# Patient Record
Sex: MALE | Race: WHITE | NOT HISPANIC OR LATINO | Employment: OTHER | URBAN - METROPOLITAN AREA
[De-identification: names, ages, dates, MRNs, and addresses within clinical notes are randomized per-mention and may not be internally consistent; named-entity substitution may affect disease eponyms.]

---

## 2022-03-09 ENCOUNTER — HOSPITAL ENCOUNTER (EMERGENCY)
Facility: HOSPITAL | Age: 32
Discharge: HOME | End: 2022-03-09
Attending: EMERGENCY MEDICINE | Admitting: EMERGENCY MEDICINE

## 2022-03-09 ENCOUNTER — APPOINTMENT (OUTPATIENT)
Dept: CT IMAGING | Facility: HOSPITAL | Age: 32
End: 2022-03-09

## 2022-03-09 VITALS
HEIGHT: 74 IN | HEART RATE: 74 BPM | WEIGHT: 315 LBS | SYSTOLIC BLOOD PRESSURE: 136 MMHG | BODY MASS INDEX: 40.43 KG/M2 | RESPIRATION RATE: 16 BRPM | OXYGEN SATURATION: 100 % | DIASTOLIC BLOOD PRESSURE: 90 MMHG | TEMPERATURE: 97.5 F

## 2022-03-09 VITALS
BODY MASS INDEX: 40.2 KG/M2 | HEART RATE: 69 BPM | OXYGEN SATURATION: 95 % | HEIGHT: 74 IN | WEIGHT: 313.27 LBS | TEMPERATURE: 98.2 F | RESPIRATION RATE: 15 BRPM | DIASTOLIC BLOOD PRESSURE: 84 MMHG | SYSTOLIC BLOOD PRESSURE: 134 MMHG

## 2022-03-09 DIAGNOSIS — N13.30 HYDRONEPHROSIS, UNSPECIFIED HYDRONEPHROSIS TYPE: ICD-10-CM

## 2022-03-09 DIAGNOSIS — N23 RENAL COLIC ON RIGHT SIDE: Primary | ICD-10-CM

## 2022-03-09 LAB
ALBUMIN SERPL-MCNC: 4.3 G/DL (ref 3.5–5.2)
ALBUMIN SERPL-MCNC: 4.7 G/DL (ref 3.5–5.2)
ALBUMIN/GLOB SERPL: 1.5 G/DL
ALBUMIN/GLOB SERPL: 1.7 G/DL
ALP SERPL-CCNC: 69 U/L (ref 39–117)
ALP SERPL-CCNC: 73 U/L (ref 39–117)
ALT SERPL W P-5'-P-CCNC: 35 U/L (ref 1–41)
ALT SERPL W P-5'-P-CCNC: 41 U/L (ref 1–41)
ANION GAP SERPL CALCULATED.3IONS-SCNC: 13 MMOL/L (ref 5–15)
ANION GAP SERPL CALCULATED.3IONS-SCNC: 13.2 MMOL/L (ref 5–15)
AST SERPL-CCNC: 26 U/L (ref 1–40)
AST SERPL-CCNC: 26 U/L (ref 1–40)
BACTERIA UR QL AUTO: ABNORMAL /HPF
BACTERIA UR QL AUTO: ABNORMAL /HPF
BASOPHILS # BLD AUTO: 0.05 10*3/MM3 (ref 0–0.2)
BASOPHILS # BLD AUTO: 0.07 10*3/MM3 (ref 0–0.2)
BASOPHILS NFR BLD AUTO: 0.4 % (ref 0–1.5)
BASOPHILS NFR BLD AUTO: 0.6 % (ref 0–1.5)
BILIRUB SERPL-MCNC: 1 MG/DL (ref 0–1.2)
BILIRUB SERPL-MCNC: 1 MG/DL (ref 0–1.2)
BILIRUB UR QL STRIP: NEGATIVE
BILIRUB UR QL STRIP: NEGATIVE
BUN SERPL-MCNC: 18 MG/DL (ref 6–20)
BUN SERPL-MCNC: 21 MG/DL (ref 6–20)
BUN/CREAT SERPL: 15.8 (ref 7–25)
BUN/CREAT SERPL: 16.4 (ref 7–25)
CALCIUM SPEC-SCNC: 9.1 MG/DL (ref 8.6–10.5)
CALCIUM SPEC-SCNC: 9.3 MG/DL (ref 8.6–10.5)
CHLORIDE SERPL-SCNC: 106 MMOL/L (ref 98–107)
CHLORIDE SERPL-SCNC: 107 MMOL/L (ref 98–107)
CLARITY UR: CLEAR
CLARITY UR: CLEAR
CO2 SERPL-SCNC: 16.8 MMOL/L (ref 22–29)
CO2 SERPL-SCNC: 18 MMOL/L (ref 22–29)
COLOR UR: YELLOW
COLOR UR: YELLOW
CREAT SERPL-MCNC: 1.1 MG/DL (ref 0.76–1.27)
CREAT SERPL-MCNC: 1.33 MG/DL (ref 0.76–1.27)
DEPRECATED RDW RBC AUTO: 33.8 FL (ref 37–54)
DEPRECATED RDW RBC AUTO: 34.5 FL (ref 37–54)
EGFRCR SERPLBLD CKD-EPI 2021: 74.2 ML/MIN/1.73
EGFRCR SERPLBLD CKD-EPI 2021: 93.2 ML/MIN/1.73
EOSINOPHIL # BLD AUTO: 0.08 10*3/MM3 (ref 0–0.4)
EOSINOPHIL # BLD AUTO: 0.11 10*3/MM3 (ref 0–0.4)
EOSINOPHIL NFR BLD AUTO: 0.6 % (ref 0.3–6.2)
EOSINOPHIL NFR BLD AUTO: 0.9 % (ref 0.3–6.2)
ERYTHROCYTE [DISTWIDTH] IN BLOOD BY AUTOMATED COUNT: 11.7 % (ref 12.3–15.4)
ERYTHROCYTE [DISTWIDTH] IN BLOOD BY AUTOMATED COUNT: 11.9 % (ref 12.3–15.4)
GLOBULIN UR ELPH-MCNC: 2.7 GM/DL
GLOBULIN UR ELPH-MCNC: 2.9 GM/DL
GLUCOSE SERPL-MCNC: 114 MG/DL (ref 65–99)
GLUCOSE SERPL-MCNC: 124 MG/DL (ref 65–99)
GLUCOSE UR STRIP-MCNC: NEGATIVE MG/DL
GLUCOSE UR STRIP-MCNC: NEGATIVE MG/DL
HCT VFR BLD AUTO: 42.8 % (ref 37.5–51)
HCT VFR BLD AUTO: 43.9 % (ref 37.5–51)
HGB BLD-MCNC: 15.2 G/DL (ref 13–17.7)
HGB BLD-MCNC: 15.7 G/DL (ref 13–17.7)
HGB UR QL STRIP.AUTO: ABNORMAL
HGB UR QL STRIP.AUTO: ABNORMAL
HOLD SPECIMEN: NORMAL
HYALINE CASTS UR QL AUTO: ABNORMAL /LPF
HYALINE CASTS UR QL AUTO: ABNORMAL /LPF
IMM GRANULOCYTES # BLD AUTO: 0.08 10*3/MM3 (ref 0–0.05)
IMM GRANULOCYTES # BLD AUTO: 0.1 10*3/MM3 (ref 0–0.05)
IMM GRANULOCYTES NFR BLD AUTO: 0.6 % (ref 0–0.5)
IMM GRANULOCYTES NFR BLD AUTO: 0.8 % (ref 0–0.5)
KETONES UR QL STRIP: NEGATIVE
KETONES UR QL STRIP: NEGATIVE
LEUKOCYTE ESTERASE UR QL STRIP.AUTO: NEGATIVE
LEUKOCYTE ESTERASE UR QL STRIP.AUTO: NEGATIVE
LIPASE SERPL-CCNC: 17 U/L (ref 13–60)
LIPASE SERPL-CCNC: 18 U/L (ref 13–60)
LYMPHOCYTES # BLD AUTO: 1.76 10*3/MM3 (ref 0.7–3.1)
LYMPHOCYTES # BLD AUTO: 2.43 10*3/MM3 (ref 0.7–3.1)
LYMPHOCYTES NFR BLD AUTO: 14 % (ref 19.6–45.3)
LYMPHOCYTES NFR BLD AUTO: 19.4 % (ref 19.6–45.3)
MCH RBC QN AUTO: 28.8 PG (ref 26.6–33)
MCH RBC QN AUTO: 29.1 PG (ref 26.6–33)
MCHC RBC AUTO-ENTMCNC: 35.5 G/DL (ref 31.5–35.7)
MCHC RBC AUTO-ENTMCNC: 35.8 G/DL (ref 31.5–35.7)
MCV RBC AUTO: 81.2 FL (ref 79–97)
MCV RBC AUTO: 81.4 FL (ref 79–97)
MONOCYTES # BLD AUTO: 0.8 10*3/MM3 (ref 0.1–0.9)
MONOCYTES # BLD AUTO: 0.94 10*3/MM3 (ref 0.1–0.9)
MONOCYTES NFR BLD AUTO: 6.4 % (ref 5–12)
MONOCYTES NFR BLD AUTO: 7.5 % (ref 5–12)
NEUTROPHILS NFR BLD AUTO: 71.9 % (ref 42.7–76)
NEUTROPHILS NFR BLD AUTO: 76.9 % (ref 42.7–76)
NEUTROPHILS NFR BLD AUTO: 9.02 10*3/MM3 (ref 1.7–7)
NEUTROPHILS NFR BLD AUTO: 9.64 10*3/MM3 (ref 1.7–7)
NITRITE UR QL STRIP: NEGATIVE
NITRITE UR QL STRIP: NEGATIVE
NRBC BLD AUTO-RTO: 0 /100 WBC (ref 0–0.2)
NRBC BLD AUTO-RTO: 0 /100 WBC (ref 0–0.2)
PH UR STRIP.AUTO: 5.5 [PH] (ref 5–8)
PH UR STRIP.AUTO: <=5 [PH] (ref 5–8)
PLATELET # BLD AUTO: 242 10*3/MM3 (ref 140–450)
PLATELET # BLD AUTO: 280 10*3/MM3 (ref 140–450)
PMV BLD AUTO: 11 FL (ref 6–12)
PMV BLD AUTO: 11.5 FL (ref 6–12)
POTASSIUM SERPL-SCNC: 3.7 MMOL/L (ref 3.5–5.2)
POTASSIUM SERPL-SCNC: 3.8 MMOL/L (ref 3.5–5.2)
PROT SERPL-MCNC: 7.2 G/DL (ref 6–8.5)
PROT SERPL-MCNC: 7.4 G/DL (ref 6–8.5)
PROT UR QL STRIP: NEGATIVE
PROT UR QL STRIP: NEGATIVE
RBC # BLD AUTO: 5.27 10*6/MM3 (ref 4.14–5.8)
RBC # BLD AUTO: 5.39 10*6/MM3 (ref 4.14–5.8)
RBC # UR STRIP: ABNORMAL /HPF
RBC # UR STRIP: ABNORMAL /HPF
REF LAB TEST METHOD: ABNORMAL
REF LAB TEST METHOD: ABNORMAL
SODIUM SERPL-SCNC: 136 MMOL/L (ref 136–145)
SODIUM SERPL-SCNC: 138 MMOL/L (ref 136–145)
SP GR UR STRIP: >=1.03 (ref 1–1.03)
SP GR UR STRIP: >=1.03 (ref 1–1.03)
SQUAMOUS #/AREA URNS HPF: ABNORMAL /HPF
SQUAMOUS #/AREA URNS HPF: ABNORMAL /HPF
UROBILINOGEN UR QL STRIP: ABNORMAL
UROBILINOGEN UR QL STRIP: ABNORMAL
WBC # UR STRIP: ABNORMAL /HPF
WBC # UR STRIP: ABNORMAL /HPF
WBC NRBC COR # BLD: 12.53 10*3/MM3 (ref 3.4–10.8)
WBC NRBC COR # BLD: 12.55 10*3/MM3 (ref 3.4–10.8)
WHOLE BLOOD HOLD SPECIMEN: NORMAL

## 2022-03-09 PROCEDURE — 96374 THER/PROPH/DIAG INJ IV PUSH: CPT

## 2022-03-09 PROCEDURE — 36415 COLL VENOUS BLD VENIPUNCTURE: CPT

## 2022-03-09 PROCEDURE — 83690 ASSAY OF LIPASE: CPT

## 2022-03-09 PROCEDURE — 81001 URINALYSIS AUTO W/SCOPE: CPT | Performed by: EMERGENCY MEDICINE

## 2022-03-09 PROCEDURE — 81001 URINALYSIS AUTO W/SCOPE: CPT

## 2022-03-09 PROCEDURE — 80053 COMPREHEN METABOLIC PANEL: CPT | Performed by: EMERGENCY MEDICINE

## 2022-03-09 PROCEDURE — 96375 TX/PRO/DX INJ NEW DRUG ADDON: CPT

## 2022-03-09 PROCEDURE — 99283 EMERGENCY DEPT VISIT LOW MDM: CPT

## 2022-03-09 PROCEDURE — 85025 COMPLETE CBC W/AUTO DIFF WBC: CPT | Performed by: EMERGENCY MEDICINE

## 2022-03-09 PROCEDURE — 83690 ASSAY OF LIPASE: CPT | Performed by: EMERGENCY MEDICINE

## 2022-03-09 PROCEDURE — 25010000002 ONDANSETRON PER 1 MG: Performed by: NURSE PRACTITIONER

## 2022-03-09 PROCEDURE — 25010000002 KETOROLAC TROMETHAMINE PER 15 MG: Performed by: NURSE PRACTITIONER

## 2022-03-09 PROCEDURE — 80053 COMPREHEN METABOLIC PANEL: CPT

## 2022-03-09 PROCEDURE — 25010000002 HYDROMORPHONE 1 MG/ML SOLUTION: Performed by: EMERGENCY MEDICINE

## 2022-03-09 PROCEDURE — 85025 COMPLETE CBC W/AUTO DIFF WBC: CPT

## 2022-03-09 PROCEDURE — 25010000002 ONDANSETRON PER 1 MG: Performed by: EMERGENCY MEDICINE

## 2022-03-09 PROCEDURE — 25010000002 KETOROLAC TROMETHAMINE PER 15 MG: Performed by: EMERGENCY MEDICINE

## 2022-03-09 PROCEDURE — 74176 CT ABD & PELVIS W/O CONTRAST: CPT

## 2022-03-09 RX ORDER — HYDROCODONE BITARTRATE AND ACETAMINOPHEN 7.5; 325 MG/1; MG/1
1 TABLET ORAL EVERY 6 HOURS PRN
Qty: 12 TABLET | Refills: 0 | Status: SHIPPED | OUTPATIENT
Start: 2022-03-09 | End: 2022-05-26

## 2022-03-09 RX ORDER — SODIUM CHLORIDE 0.9 % (FLUSH) 0.9 %
10 SYRINGE (ML) INJECTION AS NEEDED
Status: DISCONTINUED | OUTPATIENT
Start: 2022-03-09 | End: 2022-03-09 | Stop reason: HOSPADM

## 2022-03-09 RX ORDER — ONDANSETRON 2 MG/ML
4 INJECTION INTRAMUSCULAR; INTRAVENOUS ONCE
Status: COMPLETED | OUTPATIENT
Start: 2022-03-09 | End: 2022-03-09

## 2022-03-09 RX ORDER — TOPIRAMATE 100 MG/1
100 TABLET, FILM COATED ORAL 2 TIMES DAILY
COMMUNITY
End: 2023-01-13

## 2022-03-09 RX ORDER — TRAZODONE HYDROCHLORIDE 100 MG/1
100 TABLET ORAL NIGHTLY
COMMUNITY
End: 2022-05-26 | Stop reason: SDUPTHER

## 2022-03-09 RX ORDER — KETOROLAC TROMETHAMINE 30 MG/ML
30 INJECTION, SOLUTION INTRAMUSCULAR; INTRAVENOUS ONCE
Status: COMPLETED | OUTPATIENT
Start: 2022-03-09 | End: 2022-03-09

## 2022-03-09 RX ORDER — PHENTERMINE HYDROCHLORIDE 30 MG/1
30 CAPSULE ORAL EVERY MORNING
COMMUNITY
End: 2022-05-26

## 2022-03-09 RX ORDER — KETOROLAC TROMETHAMINE 15 MG/ML
15 INJECTION, SOLUTION INTRAMUSCULAR; INTRAVENOUS ONCE
Status: COMPLETED | OUTPATIENT
Start: 2022-03-09 | End: 2022-03-09

## 2022-03-09 RX ORDER — LANOLIN ALCOHOL/MO/W.PET/CERES
1000 CREAM (GRAM) TOPICAL DAILY
COMMUNITY
End: 2022-05-26

## 2022-03-09 RX ORDER — CYCLOBENZAPRINE HCL 10 MG
10 TABLET ORAL 3 TIMES DAILY PRN
COMMUNITY
End: 2022-05-26

## 2022-03-09 RX ORDER — GABAPENTIN 100 MG/1
100 CAPSULE ORAL 3 TIMES DAILY
COMMUNITY

## 2022-03-09 RX ORDER — ERGOCALCIFEROL 1.25 MG/1
50000 CAPSULE ORAL WEEKLY
COMMUNITY
End: 2022-08-22

## 2022-03-09 RX ORDER — TESTOSTERONE CYPIONATE 100 MG/ML
INJECTION, SOLUTION INTRAMUSCULAR
COMMUNITY
End: 2022-05-26

## 2022-03-09 RX ORDER — ONDANSETRON 4 MG/1
4 TABLET, ORALLY DISINTEGRATING ORAL EVERY 8 HOURS PRN
Qty: 15 TABLET | Refills: 0 | Status: SHIPPED | OUTPATIENT
Start: 2022-03-09 | End: 2022-05-26

## 2022-03-09 RX ADMIN — ONDANSETRON 4 MG: 2 INJECTION INTRAMUSCULAR; INTRAVENOUS at 03:57

## 2022-03-09 RX ADMIN — ONDANSETRON 4 MG: 2 INJECTION INTRAMUSCULAR; INTRAVENOUS at 16:26

## 2022-03-09 RX ADMIN — HYDROMORPHONE HYDROCHLORIDE 1 MG: 1 INJECTION, SOLUTION INTRAMUSCULAR; INTRAVENOUS; SUBCUTANEOUS at 16:36

## 2022-03-09 RX ADMIN — SODIUM CHLORIDE 1000 ML: 9 INJECTION, SOLUTION INTRAVENOUS at 03:57

## 2022-03-09 RX ADMIN — HYDROMORPHONE HYDROCHLORIDE 1 MG: 1 INJECTION, SOLUTION INTRAMUSCULAR; INTRAVENOUS; SUBCUTANEOUS at 03:57

## 2022-03-09 RX ADMIN — SODIUM CHLORIDE 1000 ML: 9 INJECTION, SOLUTION INTRAVENOUS at 16:25

## 2022-03-09 RX ADMIN — KETOROLAC TROMETHAMINE 30 MG: 30 INJECTION, SOLUTION INTRAMUSCULAR; INTRAVENOUS at 03:57

## 2022-03-09 RX ADMIN — KETOROLAC TROMETHAMINE 15 MG: 15 INJECTION, SOLUTION INTRAMUSCULAR; INTRAVENOUS at 16:27

## 2022-03-09 NOTE — ED PROVIDER NOTES
Time: 05:04 EST  Arrived by: EMS  Chief Complaint: Right flank pain  History provided by: Patient  History is limited by: N/A    History of Present Illness:  Patient is a 31 y.o. year old male that presents to the emergency department with sudden onset of right flank pain      History provided by:  Patient  Flank Pain  Pain location:  R flank  Pain quality: sharp and stabbing    Pain radiates to:  RLQ and LLQ  Pain severity:  Severe  Onset quality:  Sudden  Duration:  4 hours  Timing:  Constant  Progression:  Unchanged  Chronicity:  New  Context: awakening from sleep    Relieved by:  Nothing  Worsened by:  Nothing  Ineffective treatments:  None tried  Associated symptoms: dysuria    Associated symptoms: no anorexia, no belching, no chest pain, no chills, no constipation, no cough, no diarrhea, no fatigue, no fever, no flatus, no hematemesis, no hematochezia, no hematuria, no melena, no nausea, no shortness of breath, no sore throat and no vomiting        Similar Symptoms Previously: No  Recently seen: No      Patient Care Team  Primary Care Provider: None    Past Medical History:     Allergies   Allergen Reactions   • Compazine [Prochlorperazine] Arrhythmia     History reviewed. No pertinent past medical history.  History reviewed. No pertinent surgical history.  History reviewed. No pertinent family history.    Home Medications:  Prior to Admission medications    Not on File        Social History:   PT  reports that he has never smoked. He has never used smokeless tobacco. He reports that he does not drink alcohol and does not use drugs.    Record Review:  I have reviewed the patient's records in [employer].     Review of Systems  Review of Systems   Constitutional: Negative for chills, fatigue and fever.   HENT: Negative for sore throat.    Respiratory: Negative for cough and shortness of breath.    Cardiovascular: Negative for chest pain.   Gastrointestinal: Negative for anorexia, constipation, diarrhea, flatus,  "hematemesis, hematochezia, melena, nausea and vomiting.   Genitourinary: Positive for difficulty urinating ( Small frequent amounts), dysuria, flank pain, frequency and urgency. Negative for decreased urine volume, hematuria, scrotal swelling and testicular pain.   Musculoskeletal: Positive for back pain.   Skin: Negative.    Neurological: Negative.    Hematological: Negative.    Psychiatric/Behavioral: Negative.         Physical Exam  /84   Pulse 86   Temp 98.2 °F (36.8 °C) (Oral)   Resp 15   Ht 188 cm (74\")   Wt (!) 142 kg (313 lb 4.4 oz)   SpO2 96%   BMI 40.22 kg/m²     Physical Exam  Vitals and nursing note reviewed.   Constitutional:       General: He is not in acute distress ( Patient appears in pain).     Appearance: Normal appearance. He is not ill-appearing or toxic-appearing.   HENT:      Head: Normocephalic and atraumatic.      Mouth/Throat:      Mouth: Mucous membranes are moist.   Eyes:      General: No scleral icterus.  Cardiovascular:      Rate and Rhythm: Normal rate and regular rhythm.      Pulses: Normal pulses.      Heart sounds: Normal heart sounds.   Pulmonary:      Effort: Pulmonary effort is normal. No respiratory distress.      Breath sounds: Normal breath sounds.   Abdominal:      General: Bowel sounds are normal.      Palpations: Abdomen is soft.      Tenderness: There is no abdominal tenderness. There is right CVA tenderness. There is no left CVA tenderness.   Musculoskeletal:         General: Normal range of motion.      Cervical back: Normal range of motion and neck supple.   Skin:     General: Skin is warm and dry.   Neurological:      Mental Status: He is alert and oriented to person, place, and time.   Psychiatric:         Mood and Affect: Mood normal.         Behavior: Behavior normal.          ED Course  /84   Pulse 86   Temp 98.2 °F (36.8 °C) (Oral)   Resp 15   Ht 188 cm (74\")   Wt (!) 142 kg (313 lb 4.4 oz)   SpO2 96%   BMI 40.22 kg/m²   Results for " orders placed or performed during the hospital encounter of 03/09/22   Comprehensive Metabolic Panel    Specimen: Blood   Result Value Ref Range    Glucose 124 (H) 65 - 99 mg/dL    BUN 18 6 - 20 mg/dL    Creatinine 1.10 0.76 - 1.27 mg/dL    Sodium 138 136 - 145 mmol/L    Potassium 3.8 3.5 - 5.2 mmol/L    Chloride 107 98 - 107 mmol/L    CO2 18.0 (L) 22.0 - 29.0 mmol/L    Calcium 9.3 8.6 - 10.5 mg/dL    Total Protein 7.4 6.0 - 8.5 g/dL    Albumin 4.70 3.50 - 5.20 g/dL    ALT (SGPT) 41 1 - 41 U/L    AST (SGOT) 26 1 - 40 U/L    Alkaline Phosphatase 73 39 - 117 U/L    Total Bilirubin 1.0 0.0 - 1.2 mg/dL    Globulin 2.7 gm/dL    A/G Ratio 1.7 g/dL    BUN/Creatinine Ratio 16.4 7.0 - 25.0    Anion Gap 13.0 5.0 - 15.0 mmol/L    eGFR 93.2 >60.0 mL/min/1.73   Lipase    Specimen: Blood   Result Value Ref Range    Lipase 18 13 - 60 U/L   Urinalysis With Microscopic If Indicated (No Culture) - Urine, Clean Catch    Specimen: Urine, Clean Catch   Result Value Ref Range    Color, UA Yellow Yellow, Straw    Appearance, UA Clear Clear    pH, UA <=5.0 5.0 - 8.0    Specific Gravity, UA >=1.030 1.005 - 1.030    Glucose, UA Negative Negative    Ketones, UA Negative Negative    Bilirubin, UA Negative Negative    Blood, UA Large (3+) (A) Negative    Protein, UA Negative Negative    Leuk Esterase, UA Negative Negative    Nitrite, UA Negative Negative    Urobilinogen, UA 0.2 E.U./dL 0.2 - 1.0 E.U./dL   CBC Auto Differential    Specimen: Blood   Result Value Ref Range    WBC 12.53 (H) 3.40 - 10.80 10*3/mm3    RBC 5.39 4.14 - 5.80 10*6/mm3    Hemoglobin 15.7 13.0 - 17.7 g/dL    Hematocrit 43.9 37.5 - 51.0 %    MCV 81.4 79.0 - 97.0 fL    MCH 29.1 26.6 - 33.0 pg    MCHC 35.8 (H) 31.5 - 35.7 g/dL    RDW 11.9 (L) 12.3 - 15.4 %    RDW-SD 34.5 (L) 37.0 - 54.0 fl    MPV 11.5 6.0 - 12.0 fL    Platelets 280 140 - 450 10*3/mm3    Neutrophil % 71.9 42.7 - 76.0 %    Lymphocyte % 19.4 (L) 19.6 - 45.3 %    Monocyte % 6.4 5.0 - 12.0 %    Eosinophil % 0.9 0.3  - 6.2 %    Basophil % 0.6 0.0 - 1.5 %    Immature Grans % 0.8 (H) 0.0 - 0.5 %    Neutrophils, Absolute 9.02 (H) 1.70 - 7.00 10*3/mm3    Lymphocytes, Absolute 2.43 0.70 - 3.10 10*3/mm3    Monocytes, Absolute 0.80 0.10 - 0.90 10*3/mm3    Eosinophils, Absolute 0.11 0.00 - 0.40 10*3/mm3    Basophils, Absolute 0.07 0.00 - 0.20 10*3/mm3    Immature Grans, Absolute 0.10 (H) 0.00 - 0.05 10*3/mm3    nRBC 0.0 0.0 - 0.2 /100 WBC   Urinalysis, Microscopic Only - Urine, Clean Catch    Specimen: Urine, Clean Catch   Result Value Ref Range    RBC, UA Too Numerous to Count (A) None Seen /HPF    WBC, UA 0-2 (A) None Seen /HPF    Bacteria, UA None Seen None Seen /HPF    Squamous Epithelial Cells, UA 0-2 None Seen, 0-2 /HPF    Hyaline Casts, UA 3-6 None Seen /LPF    Methodology Automated Microscopy    Green Top (Gel)   Result Value Ref Range    Extra Tube Hold for add-ons.    Lavender Top   Result Value Ref Range    Extra Tube hold for add-on    Gold Top - SST   Result Value Ref Range    Extra Tube Hold for add-ons.    Light Blue Top   Result Value Ref Range    Extra Tube hold for add-on      Medications   sodium chloride 0.9 % flush 10 mL (has no administration in time range)   ketorolac (TORADOL) injection 30 mg (30 mg Intravenous Given 3/9/22 0357)   ondansetron (ZOFRAN) injection 4 mg (4 mg Intravenous Given 3/9/22 0357)   sodium chloride 0.9 % bolus 1,000 mL (1,000 mL Intravenous New Bag 3/9/22 0357)   HYDROmorphone (DILAUDID) injection 1 mg (1 mg Intravenous Given 3/9/22 0357)     CT Abdomen Pelvis Without Contrast    Result Date: 3/9/2022  Narrative: PROCEDURE: CT ABDOMEN PELVIS WO CONTRAST  COMPARISON: None  INDICATIONS: Right flank pain rule out stone  TECHNIQUE: CT images were created without intravenous contrast.   PROTOCOL:   Standard imaging protocol performed    RADIATION:   DLP: 1422.7 mGy*cm   Automated exposure control was utilized to minimize radiation dose.  FINDINGS:  Lung bases are clear. The distal esophagus is  unremarkable. Heart size is normal. The superficial fat and the underlying musculature appear within normal limits. There are no acute osseous abnormalities or destructive bone lesions. There are no significant degenerative changes.  There is mild right-sided hydronephrosis and slight asymmetric right-sided perinephric stranding.  There is no evidence of a ureteral or kidney stone.  The liver, stomach, gallbladder, biliary tree, and spleen, pancreas, adrenal glands, urinary bladder and loops of small and large bowel appear within normal limits. The appendix is clearly demonstrated and appears normal. There is no ascites, pneumoperitoneum or lymphadenopathy. Abdominal vasculature is unremarkable.  Prostate is normal size.       Impression:  Mild right-sided hydronephrosis without evidence of a kidney or ureteral stone.  This could be related to a recently passed stone or UTI.     JENNIFER FLEMING MD       Electronically Signed and Approved By: JENNIFER FLEMING MD on 3/09/2022 at 4:54               Medical Decision Making:                     MDM  Number of Diagnoses or Management Options  Hydronephrosis, unspecified hydronephrosis type  Renal colic on right side  Diagnosis management comments: I have spoken with the patient. I have explained the patient´s condition, diagnoses and treatment plan based on the information available to me at this time. I have answered the patient questions and addressed any concerns. The patient has a good  understanding of the patient´s diagnosis, condition, and treatment plan as can be expected at this point. The vital signs have been stable. The patient´s condition is stable and appropriate for discharge from the emergency department.      The patient will pursue further outpatient evaluation with the primary care physician or other designated or consulting physician as outlined in the discharge instructions. They are agreeable to this plan of care and follow-up instructions have been  explained in detail. The patient has received these instructions in written format and have expressed an understanding of the discharge instructions. The patient is aware that any significant change in condition or worsening of symptoms should prompt an immediate return to this or the closest emergency department or call to 911.         Amount and/or Complexity of Data Reviewed  Clinical lab tests: reviewed and ordered  Tests in the radiology section of CPT®: reviewed and ordered  Tests in the medicine section of CPT®: ordered and reviewed    Risk of Complications, Morbidity, and/or Mortality  Presenting problems: moderate  Diagnostic procedures: moderate  Management options: low    Patient Progress  Patient progress: stable       Final diagnoses:   Renal colic on right side   Hydronephrosis, unspecified hydronephrosis type        Disposition:  ED Disposition     ED Disposition   Discharge    Condition   Stable    Comment   --              Sasha Sanchez, APRN  03/09/22 0507

## 2022-03-09 NOTE — DISCHARGE INSTRUCTIONS
Ct findings indicate a recently passed kidney stone. No current obstruction noted    Meds for comdort    Follow up with urologist if symptoms persist    Return for new/worsening symptoms

## 2022-03-09 NOTE — DISCHARGE INSTRUCTIONS
Based off your CT scan and the small amount of blood in your urine and your pain location, it sounds like you are probably passing a small kidney stone that is still causing significant pain and nausea.    No signs of an infection were seen.    To help pass the stone drink plenty of fluids to flush out the urinary tract and also use the Flomax pill once a day until stone passes, and use the urine strainer to monitor for stone to pass.    You can call the urology clinic for a follow-up appointment if your symptoms continue.    Only use the hydrocodone for severe pain if needed.

## 2022-03-09 NOTE — ED PROVIDER NOTES
Time: 1545  Arrived by: Private vehicle  Chief Complaint: Right flank pain  History provided by: Patient    History of Present Illness:  Patient is a 31 y.o. year old male that presents to the emergency department with worsening 8 out of 10 sharp achy right-sided flank pain without radiation today, associated with some nausea and urinary discomfort and hesitancy.    He was just seen in the ED last night for this and CT scan does show some mild hydronephrosis and perinephric stranding, but no visualized ureteral stone or obstruction was seen at that time.    He is not having any fevers or chills or cough or congestion or abdominal pain or chest pain or dyspnea or signs of COVID-19.    He was discharged home with some NSAIDs and ondansetron, but still having severe pain.    He has no previous history of kidney stones.    He denies any recent heavy lifting or back injury.    Similar Symptoms Previously: Yes  Recently seen: Yes, seen in ED last night for the same      Patient Care Team  Primary Care Provider: Unknown    Past Medical History:   Flank pain, reviewed    Social History     Socioeconomic History   • Marital status:    Tobacco Use   • Smoking status: Never Smoker   • Smokeless tobacco: Never Used   Vaping Use   • Vaping Use: Every day   Substance and Sexual Activity   • Alcohol use: Never   • Drug use: Never   • Sexual activity: Defer         Allergies   Allergen Reactions   • Compazine [Prochlorperazine] Arrhythmia     History reviewed. No pertinent surgical history.  History reviewed. No pertinent family history.    Home Medications:  Prior to Admission medications    Medication Sig Start Date End Date Taking? Authorizing Provider   cyclobenzaprine (FLEXERIL) 10 MG tablet Take 10 mg by mouth 3 (Three) Times a Day As Needed for Muscle Spasms.    Provider, MD Alonso   diclofenac (VOLTAREN) 50 MG EC tablet Take 1 tablet by mouth 3 (Three) Times a Day As Needed (pain). 3/9/22   Sasha Sanchez APRN  "  gabapentin (NEURONTIN) 100 MG capsule Take 100 mg by mouth 3 (Three) Times a Day.    Alonso Calhoun MD   ondansetron ODT (ZOFRAN-ODT) 4 MG disintegrating tablet Place 1 tablet on the tongue Every 8 (Eight) Hours As Needed for Nausea or Vomiting. 3/9/22   Sasha Sanchez APRN   phentermine 30 MG capsule Take 30 mg by mouth Every Morning.    Alonso Calhoun MD   testosterone cypionate (DEPO-TESTOSTERONE) 100 MG/ML solution injection Inject  into the appropriate muscle as directed by prescriber Every 14 (Fourteen) Days.    Alonso Calhoun MD   topiramate (TOPAMAX) 100 MG tablet Take 100 mg by mouth 2 (Two) Times a Day.    Alonso Calhoun MD   traZODone (DESYREL) 100 MG tablet Take 100 mg by mouth Every Night.    Alonso Calhoun MD   vitamin B-12 (CYANOCOBALAMIN) 1000 MCG tablet Take 1,000 mcg by mouth Daily.    Alonso Calhoun MD   vitamin D (ERGOCALCIFEROL) 1.25 MG (82070 UT) capsule capsule Take 50,000 Units by mouth 1 (One) Time Per Week.    Alonso Calhoun MD        Record Review:  I have reviewed the patient's records in [email or web address].     Review of Systems:  Review of Systems   I performed a 10 point review of systems which was all negative, except for the positives found in the HPI above.  Physical Exam:  /90 (Patient Position: Sitting)   Pulse 74   Temp 97.5 °F (36.4 °C) (Oral)   Resp 16   Ht 188 cm (74\")   Wt (!) 143 kg (315 lb 4.1 oz)   SpO2 100%   BMI 40.48 kg/m²     Physical Exam   General: Awake alert and in moderate distress due to right flank pain    HEENT: Head normocephalic atraumatic, eyes PERRLA EOMI, nose normal, oropharynx normal.    Neck: Supple full range of motion, no meningismus, no lymphadenopathy    Heart: Regular rate and rhythm, no murmurs or rubs, 2+ radial pulses bilaterally    Lungs: Clear to auscultation bilaterally without wheezes or crackles, no respiratory distress    Abdomen: Soft, nontender, nondistended, no rebound or guarding    Back " exam: No T-spine or L-spine tenderness palpation, minimal right CVA tenderness to palpation.  Range of motion.    Skin: Warm, dry, no rash    Musculoskeletal: Normal range of motion, no lower extremity edema    Neurologic: Oriented x3, no motor deficits no sensory deficits    Psychiatric: Mood appears stable, no psychosis        Medications in the Emergency Department:  Medications   sodium chloride 0.9 % flush 10 mL (has no administration in time range)   sodium chloride 0.9 % flush 10 mL (has no administration in time range)   sodium chloride 0.9 % bolus 1,000 mL (1,000 mL Intravenous New Bag 3/9/22 1625)   ondansetron (ZOFRAN) injection 4 mg (4 mg Intravenous Given 3/9/22 1626)   HYDROmorphone (DILAUDID) injection 1 mg (1 mg Intravenous Given 3/9/22 1636)   ketorolac (TORADOL) injection 15 mg (15 mg Intravenous Given 3/9/22 1627)        Labs  Lab Results (last 24 hours)     Procedure Component Value Units Date/Time    CBC & Differential [[id number]]  (Abnormal) Collected: 03/09/22 0354    Specimen: Blood Updated: 03/09/22 0401    Narrative:      The following orders were created for panel order CBC & Differential.  Procedure                               Abnormality         Status                     ---------                               -----------         ------                     CBC Auto Differential[[id number]]        Abnormal            Final result                 Please view results for these tests on the individual orders.    Comprehensive Metabolic Panel [[id number]]  (Abnormal) Collected: 03/09/22 0354    Specimen: Blood Updated: 03/09/22 0418     Glucose 124 mg/dL      BUN 18 mg/dL      Creatinine 1.10 mg/dL      Sodium 138 mmol/L      Potassium 3.8 mmol/L      Chloride 107 mmol/L      CO2 18.0 mmol/L      Calcium 9.3 mg/dL      Total Protein 7.4 g/dL      Albumin 4.70 g/dL      ALT (SGPT) 41 U/L      AST (SGOT) 26 U/L      Alkaline Phosphatase 73 U/L      Total Bilirubin 1.0 mg/dL      Globulin 2.7  gm/dL      A/G Ratio 1.7 g/dL      BUN/Creatinine Ratio 16.4     Anion Gap 13.0 mmol/L      eGFR 93.2 mL/min/1.73      Comment: National Kidney Foundation and American Society of Nephrology (ASN) Task Force recommended calculation based on the Chronic Kidney Disease Epidemiology Collaboration (CKD-EPI) equation refit without adjustment for race.       Narrative:      GFR Normal >60  Chronic Kidney Disease <60  Kidney Failure <15      Lipase [[id number]]  (Normal) Collected: 03/09/22 0354    Specimen: Blood Updated: 03/09/22 0418     Lipase 18 U/L     CBC Auto Differential [[id number]]  (Abnormal) Collected: 03/09/22 0354    Specimen: Blood Updated: 03/09/22 0401     WBC 12.53 10*3/mm3      RBC 5.39 10*6/mm3      Hemoglobin 15.7 g/dL      Hematocrit 43.9 %      MCV 81.4 fL      MCH 29.1 pg      MCHC 35.8 g/dL      RDW 11.9 %      RDW-SD 34.5 fl      MPV 11.5 fL      Platelets 280 10*3/mm3      Neutrophil % 71.9 %      Lymphocyte % 19.4 %      Monocyte % 6.4 %      Eosinophil % 0.9 %      Basophil % 0.6 %      Immature Grans % 0.8 %      Neutrophils, Absolute 9.02 10*3/mm3      Lymphocytes, Absolute 2.43 10*3/mm3      Monocytes, Absolute 0.80 10*3/mm3      Eosinophils, Absolute 0.11 10*3/mm3      Basophils, Absolute 0.07 10*3/mm3      Immature Grans, Absolute 0.10 10*3/mm3      nRBC 0.0 /100 WBC     Urinalysis With Microscopic If Indicated (No Culture) - Urine, Clean Catch [[id number]]  (Abnormal) Collected: 03/09/22 0425    Specimen: Urine, Clean Catch Updated: 03/09/22 0437     Color, UA Yellow     Appearance, UA Clear     pH, UA <=5.0     Specific Gravity, UA >=1.030     Glucose, UA Negative     Ketones, UA Negative     Bilirubin, UA Negative     Blood, UA Large (3+)     Protein, UA Negative     Leuk Esterase, UA Negative     Nitrite, UA Negative     Urobilinogen, UA 0.2 E.U./dL    Urinalysis, Microscopic Only - Urine, Clean Catch [[id number]]  (Abnormal) Collected: 03/09/22 0425    Specimen: Urine, Clean Catch  Updated: 03/09/22 0437     RBC, UA Too Numerous to Count /HPF      WBC, UA 0-2 /HPF      Bacteria, UA None Seen /HPF      Squamous Epithelial Cells, UA 0-2 /HPF      Hyaline Casts, UA 3-6 /LPF      Methodology Automated Microscopy    CBC & Differential [[id number]]  (Abnormal) Collected: 03/09/22 1329    Specimen: Blood from Hand, Right Updated: 03/09/22 1339    Narrative:      The following orders were created for panel order CBC & Differential.  Procedure                               Abnormality         Status                     ---------                               -----------         ------                     CBC Auto Differential[[id number]]        Abnormal            Final result                 Please view results for these tests on the individual orders.    Comprehensive Metabolic Panel [[id number]]  (Abnormal) Collected: 03/09/22 1329    Specimen: Blood from Hand, Right Updated: 03/09/22 1407     Glucose 114 mg/dL      BUN 21 mg/dL      Creatinine 1.33 mg/dL      Sodium 136 mmol/L      Potassium 3.7 mmol/L      Chloride 106 mmol/L      CO2 16.8 mmol/L      Calcium 9.1 mg/dL      Total Protein 7.2 g/dL      Albumin 4.30 g/dL      ALT (SGPT) 35 U/L      AST (SGOT) 26 U/L      Alkaline Phosphatase 69 U/L      Total Bilirubin 1.0 mg/dL      Globulin 2.9 gm/dL      A/G Ratio 1.5 g/dL      BUN/Creatinine Ratio 15.8     Anion Gap 13.2 mmol/L      eGFR 74.2 mL/min/1.73      Comment: National Kidney Foundation and American Society of Nephrology (ASN) Task Force recommended calculation based on the Chronic Kidney Disease Epidemiology Collaboration (CKD-EPI) equation refit without adjustment for race.       Narrative:      GFR Normal >60  Chronic Kidney Disease <60  Kidney Failure <15      Lipase [[id number]]  (Normal) Collected: 03/09/22 1329    Specimen: Blood from Hand, Right Updated: 03/09/22 1407     Lipase 17 U/L     CBC Auto Differential [[id number]]  (Abnormal) Collected: 03/09/22 1329    Specimen: Blood from  Hand, Right Updated: 03/09/22 1339     WBC 12.55 10*3/mm3      RBC 5.27 10*6/mm3      Hemoglobin 15.2 g/dL      Hematocrit 42.8 %      MCV 81.2 fL      MCH 28.8 pg      MCHC 35.5 g/dL      RDW 11.7 %      RDW-SD 33.8 fl      MPV 11.0 fL      Platelets 242 10*3/mm3      Neutrophil % 76.9 %      Lymphocyte % 14.0 %      Monocyte % 7.5 %      Eosinophil % 0.6 %      Basophil % 0.4 %      Immature Grans % 0.6 %      Neutrophils, Absolute 9.64 10*3/mm3      Lymphocytes, Absolute 1.76 10*3/mm3      Monocytes, Absolute 0.94 10*3/mm3      Eosinophils, Absolute 0.08 10*3/mm3      Basophils, Absolute 0.05 10*3/mm3      Immature Grans, Absolute 0.08 10*3/mm3      nRBC 0.0 /100 WBC     Urinalysis With Microscopic If Indicated (No Culture) - Urine, Clean Catch [[id number]]  (Abnormal) Collected: 03/09/22 1415    Specimen: Urine, Clean Catch Updated: 03/09/22 1427     Color, UA Yellow     Appearance, UA Clear     pH, UA 5.5     Specific Gravity, UA >=1.030     Glucose, UA Negative     Ketones, UA Negative     Bilirubin, UA Negative     Blood, UA Trace     Protein, UA Negative     Leuk Esterase, UA Negative     Nitrite, UA Negative     Urobilinogen, UA 1.0 E.U./dL    Urinalysis, Microscopic Only - Urine, Clean Catch [[id number]]  (Abnormal) Collected: 03/09/22 1415    Specimen: Urine, Clean Catch Updated: 03/09/22 1427     RBC, UA 0-2 /HPF      WBC, UA 0-2 /HPF      Bacteria, UA None Seen /HPF      Squamous Epithelial Cells, UA 0-2 /HPF      Hyaline Casts, UA 0-2 /LPF      Methodology Automated Microscopy           Imaging:  CT Abdomen Pelvis Without Contrast    Result Date: 3/9/2022  PROCEDURE: CT ABDOMEN PELVIS WO CONTRAST  COMPARISON: None  INDICATIONS: Right flank pain rule out stone  TECHNIQUE: CT images were created without intravenous contrast.   PROTOCOL:   Standard imaging protocol performed    RADIATION:   DLP: 1422.7 mGy*cm   Automated exposure control was utilized to minimize radiation dose.  FINDINGS:  Lung bases are  clear. The distal esophagus is unremarkable. Heart size is normal. The superficial fat and the underlying musculature appear within normal limits. There are no acute osseous abnormalities or destructive bone lesions. There are no significant degenerative changes.  There is mild right-sided hydronephrosis and slight asymmetric right-sided perinephric stranding.  There is no evidence of a ureteral or kidney stone.  The liver, stomach, gallbladder, biliary tree, and spleen, pancreas, adrenal glands, urinary bladder and loops of small and large bowel appear within normal limits. The appendix is clearly demonstrated and appears normal. There is no ascites, pneumoperitoneum or lymphadenopathy. Abdominal vasculature is unremarkable.  Prostate is normal size.        Mild right-sided hydronephrosis without evidence of a kidney or ureteral stone.  This could be related to a recently passed stone or UTI.     JENNIFER FLEMING MD       Electronically Signed and Approved By: JENNIFER FLEMING MD on 3/09/2022 at 4:54                Procedures:  Procedures    Progress                            Medical Decision Making:  MDM   My differential diagnosis in this patient with flank pain includes but not limited to: Renal colic from obstructing stone, pyelonephritis, musculoskeletal back pain, atypical presentation of diverticulitis or colitis.        This patient is a pleasant healthy-appearing 31-year-old male who presents for the second time in 24 hours to the ED for right-sided flank pain and urinary hesitancy.    It looks like he is experiencing renal colic and his CT scan from last night I did review, showing some mild right-sided hydronephrosis and perinephric stranding, but no visualized stone obstructing at that time.    Fortunately, he had normal appendix visualized and normal gallbladder and no other abnormalities found on scan.    Blood work today shows slight elevation of his creatinine and small hematuria present but no  UTI.    Clinically, it looks like he is having renal colic and I treated his pain with some IV Dilaudid and a small dose of Toradol and some fluids and Zofran for symptom relief.    He looks stable to be discharged home and managed as an outpatient for probable renal colic with a nonvisualized ureteral stone, and I will give him a urine strainer to go in some prescription pain and nausea meds and Flomax and also refer him urgently to urology clinic.    Final diagnoses:   Renal colic on right side        Disposition:  ED Disposition     ED Disposition   Discharge    Condition   Stable    Comment   --                    Clemente Griffiths MD  03/09/22 3573

## 2022-03-10 ENCOUNTER — TELEPHONE (OUTPATIENT)
Dept: FAMILY MEDICINE CLINIC | Facility: CLINIC | Age: 32
End: 2022-03-10

## 2022-03-10 DIAGNOSIS — N20.0 KIDNEY STONES: Primary | ICD-10-CM

## 2022-03-10 NOTE — TELEPHONE ENCOUNTER
Caller: Thai Ball    Relationship: Self    Best call back number: [phone]    What is the medical concern/diagnosis: KIDNEY STONES    What specialty or service is being requested: UROLOGY    Any additional details: PATIENT WAS SEEN IN EMERGENCY ROOM TWICE YESTERDAY, 03/09/2022. HE WAS DIAGNOSED WITH KIDNEY STONES AND THEY REFERRED HIM TO UROLOGY. PATIENT REACHED THE UROLOGY OFFICE, BUT THEY NEED AN AUTHORIZATION FROM PROVIDER. HE IS ESTABLISHING CARE WITH [facility] ON 04/11/2022, BUT HE IS WANTING TO SEE IF [facility] IS WILLING TO AUTHORIZE THAT NOW SO HE CAN SEE UROLOGY DUE TO STILL HAVING PAIN WHEN URINATING, OR IF HE CAN BE SEEN SOONER IN ORDER TO DO THAT, ETC. HE IS REQUESTING CALL BACK REGARDING THIS AS SOON AS POSSIBLE.

## 2022-03-10 NOTE — TELEPHONE ENCOUNTER
He has go ahead and place a urology referral.  Remind him that he needs to keep his follow-up appointment.

## 2022-03-11 ENCOUNTER — TELEPHONE (OUTPATIENT)
Dept: UROLOGY | Facility: CLINIC | Age: 32
End: 2022-03-11

## 2022-04-11 ENCOUNTER — OFFICE VISIT (OUTPATIENT)
Dept: FAMILY MEDICINE CLINIC | Facility: CLINIC | Age: 32
End: 2022-04-11

## 2022-04-11 VITALS
WEIGHT: 315 LBS | SYSTOLIC BLOOD PRESSURE: 133 MMHG | TEMPERATURE: 98.4 F | OXYGEN SATURATION: 98 % | HEIGHT: 74 IN | BODY MASS INDEX: 40.43 KG/M2 | HEART RATE: 108 BPM | DIASTOLIC BLOOD PRESSURE: 82 MMHG

## 2022-04-11 DIAGNOSIS — M51.35 DDD (DEGENERATIVE DISC DISEASE), THORACOLUMBAR: Primary | ICD-10-CM

## 2022-04-11 DIAGNOSIS — Z00.00 ENCOUNTER FOR MEDICAL EXAMINATION TO ESTABLISH CARE: ICD-10-CM

## 2022-04-11 DIAGNOSIS — R06.02 SHORTNESS OF BREATH: ICD-10-CM

## 2022-04-11 PROBLEM — G43.009 MIGRAINE WITHOUT AURA AND WITHOUT STATUS MIGRAINOSUS, NOT INTRACTABLE: Status: ACTIVE | Noted: 2022-04-11

## 2022-04-11 PROBLEM — M79.7 FIBROMYALGIA: Status: ACTIVE | Noted: 2022-04-11

## 2022-04-11 PROBLEM — E53.8 B12 DEFICIENCY: Status: ACTIVE | Noted: 2022-04-11

## 2022-04-11 PROBLEM — F41.1 GENERALIZED ANXIETY DISORDER: Status: ACTIVE | Noted: 2022-04-11

## 2022-04-11 PROBLEM — M47.812 CERVICAL SPONDYLOSIS: Status: ACTIVE | Noted: 2022-04-11

## 2022-04-11 PROBLEM — E34.9 HYPOTESTOSTERONEMIA: Status: ACTIVE | Noted: 2022-04-11

## 2022-04-11 PROCEDURE — 99204 OFFICE O/P NEW MOD 45 MIN: CPT | Performed by: FAMILY MEDICINE

## 2022-04-11 RX ORDER — GALCANEZUMAB 120 MG/ML
1 INJECTION, SOLUTION SUBCUTANEOUS
COMMUNITY
End: 2022-05-26 | Stop reason: SDUPTHER

## 2022-04-11 RX ORDER — DULOXETIN HYDROCHLORIDE 60 MG/1
60 CAPSULE, DELAYED RELEASE ORAL DAILY
COMMUNITY
Start: 2022-01-06 | End: 2022-05-26 | Stop reason: SDUPTHER

## 2022-04-11 RX ORDER — RIZATRIPTAN BENZOATE 10 MG/1
10 TABLET, ORALLY DISINTEGRATING ORAL ONCE AS NEEDED
COMMUNITY
Start: 2022-02-17

## 2022-04-11 RX ORDER — CETIRIZINE HYDROCHLORIDE 10 MG/1
10 TABLET ORAL DAILY
COMMUNITY
Start: 2022-02-13

## 2022-04-11 RX ORDER — ALBUTEROL SULFATE 90 UG/1
2 AEROSOL, METERED RESPIRATORY (INHALATION) EVERY 6 HOURS PRN
Qty: 18 G | Refills: 0 | Status: SHIPPED | OUTPATIENT
Start: 2022-04-11

## 2022-04-11 NOTE — ASSESSMENT & PLAN NOTE
Overall [facility].  We will have him continue to get all of his chronic conditions treated at the [facility] entirely.

## 2022-04-11 NOTE — ASSESSMENT & PLAN NOTE
He wants to see the chiropractor see if he can get some additional benefit.  Instructed this may or may not provide any additional benefit.  It surely I do not think would be harmful.  Overall though he also needs to work on some lifestyle changes and some weight loss.  That may provide more benefit to long-term.

## 2022-04-11 NOTE — ASSESSMENT & PLAN NOTE
His shortness of breath sounds more like exercise-induced versus anxiety.  We will get a chest x-ray and pulmonary function test.  We will give him a trial of some albuterol in the meantime to use before any type of physical activity.

## 2022-04-11 NOTE — PROGRESS NOTES
Chief Complaint   Patient presents with   • Establish Care     Chiropractor  referral         Subjective     Thai Ball  has a past medical history of Kidney stone.    Establish care-[facility]  He is a multitude of chronic diagnosis of which he sees them for.    Chronic back pain-his multiple issues with his back.  In his cervical spine he has spondylosis and degenerative disc disease throughout his thoracolumbar region.  His back is worse with any activities including sitting.    Shortness of breath-he states for the past year he has noticed some intermittent episodes of shortness of breath.  It comes with any physical activity including sexual activity.  Sometimes his upper chest will feel tight.  He will have some coughing and wheezing.  He has no history of childhood asthma or tobacco use.      PHQ-2 Depression Screening  Little interest or pleasure in doing things?     Feeling down, depressed, or hopeless?     PHQ-2 Total Score     PHQ-9 Depression Screening  Little interest or pleasure in doing things?     Feeling down, depressed, or hopeless?     Trouble falling or staying asleep, or sleeping too much?     Feeling tired or having little energy?     Poor appetite or overeating?     Feeling bad about yourself - or that you are a failure or have let yourself or your family down?     Trouble concentrating on things, such as reading the newspaper or watching television?     Moving or speaking so slowly that other people could have noticed? Or the opposite - being so fidgety or restless that you have been moving around a lot more than usual?     Thoughts that you would be better off dead, or of hurting yourself in some way?     PHQ-9 Total Score     If you checked off any problems, how difficult have these problems made it for you to do your work, take care of things at home, or get along with other people?       Allergies   Allergen Reactions   • Compazine  [Prochlorperazine] Arrhythmia       Prior to Admission medications    Medication Sig Start Date End Date Taking? Authorizing Provider   cetirizine (zyrTEC) 10 MG tablet  2/13/22  Yes Alonso Calhoun MD   cyclobenzaprine (FLEXERIL) 10 MG tablet Take 10 mg by mouth 3 (Three) Times a Day As Needed for Muscle Spasms.   Yes Alonso Calhoun MD   DULoxetine (CYMBALTA) 60 MG capsule  1/6/22  Yes Alonso Calhoun MD   Galcanezumab-gnlm (Emgality) 120 MG/ML solution prefilled syringe 1 mL.   Yes Alonso Calhoun MD   rizatriptan MLT (MAXALT-MLT) 10 MG disintegrating tablet  2/17/22  Yes Alonso Calhoun MD   topiramate (TOPAMAX) 100 MG tablet Take 100 mg by mouth 2 (Two) Times a Day.   Yes Alonso Calhoun MD   traZODone (DESYREL) 100 MG tablet Take 100 mg by mouth Every Night.   Yes Alonso Calhoun MD   diclofenac (VOLTAREN) 50 MG EC tablet Take 1 tablet by mouth 3 (Three) Times a Day As Needed (pain). 3/9/22   Sasha Sanchez APRN   gabapentin (NEURONTIN) 100 MG capsule Take 100 mg by mouth 3 (Three) Times a Day.    Alonso Calhoun MD   HYDROcodone-acetaminophen (NORCO) 7.5-325 MG per tablet Take 1 tablet by mouth Every 6 (Six) Hours As Needed for Severe Pain . 3/9/22   Clemente Griffiths MD   ondansetron ODT (ZOFRAN-ODT) 4 MG disintegrating tablet Place 1 tablet on the tongue Every 8 (Eight) Hours As Needed for Nausea or Vomiting. 3/9/22   Sasha Sanchez APRN   phentermine 30 MG capsule Take 30 mg by mouth Every Morning.    Alonso Calhoun MD   testosterone cypionate (DEPO-TESTOSTERONE) 100 MG/ML solution injection Inject  into the appropriate muscle as directed by prescriber Every 14 (Fourteen) Days.    Alonso Calhoun MD   vitamin B-12 (CYANOCOBALAMIN) 1000 MCG tablet Take 1,000 mcg by mouth Daily.    Alonso Calhoun MD   vitamin D (ERGOCALCIFEROL) 1.25 MG (78895 UT) capsule capsule Take 50,000 Units by mouth 1 (One) Time Per Week.    Alonso Calhoun MD     "    Patient Active Problem List   Diagnosis   • Generalized anxiety disorder   • Migraine without aura and without status migrainosus, not intractable   • DDD (degenerative disc disease), thoracolumbar   • Cervical spondylosis   • Fibromyalgia   • Hypotestosteronemia   • B12 deficiency   • Encounter for medical examination to establish care   • Shortness of breath        No past surgical history on file.    Social History     Socioeconomic History   • Marital status:    Tobacco Use   • Smoking status: Never Smoker   • Smokeless tobacco: Former User   Vaping Use   • Vaping Use: Every day   Substance and Sexual Activity   • Alcohol use: Never   • Drug use: Never   • Sexual activity: Defer       No family history on file.    Family history, surgical history, past medical history, Allergies and med's reviewed with patient today and updated in [employer] [facility] [employer] E[facility].     ROS:  Review of Systems   Constitutional: Positive for fatigue.   HENT: Negative for congestion, postnasal drip and rhinorrhea.    Eyes: Negative for blurred vision and visual disturbance.   Respiratory: Positive for cough, chest tightness, shortness of breath and wheezing.    Cardiovascular: Negative for chest pain and palpitations.   Gastrointestinal: Negative for abdominal pain, constipation and diarrhea.   Musculoskeletal: Positive for back pain.   Neurological: Positive for headache.   Psychiatric/Behavioral: Positive for depressed mood. Negative for sleep disturbance. The patient is nervous/anxious.        OBJECTIVE:  Vitals:    04/11/22 1011   BP: 133/82   BP Location: Right arm   Patient Position: Sitting   Cuff Size: Adult   Pulse: 108   Temp: 98.4 °F (36.9 °C)   TempSrc: Temporal   SpO2: 98%   Weight: (!) 144 kg (316 lb 12.8 oz)   Height: 188 cm (74\")     No exam data present   Body mass index is 40.67 kg/m².  No LMP for male patient.    Physical Exam  Vitals and nursing note reviewed.   Constitutional:       General: He is not in acute distress.     " Appearance: Normal appearance. He is obese.   HENT:      Head: Normocephalic.      Right Ear: Tympanic membrane, ear canal and external ear normal.      Left Ear: Tympanic membrane, ear canal and external ear normal.      Nose: Nose normal.      Mouth/Throat:      Mouth: Mucous membranes are moist.      Pharynx: Oropharynx is clear.   Eyes:      General: No scleral icterus.     Conjunctiva/sclera: Conjunctivae normal.      Pupils: Pupils are equal, round, and reactive to light.   Cardiovascular:      Rate and Rhythm: Normal rate and regular rhythm.      Pulses: Normal pulses.      Heart sounds: Normal heart sounds. No murmur heard.  Pulmonary:      Effort: Pulmonary effort is normal.      Breath sounds: Normal breath sounds. No wheezing, rhonchi or rales.   Musculoskeletal:      Thoracic back: No spasms or tenderness.      Lumbar back: No spasms or tenderness.   Lymphadenopathy:      Cervical: No cervical adenopathy.   Skin:     General: Skin is warm and dry.      Coloration: Skin is not jaundiced.      Findings: No rash.   Neurological:      General: No focal deficit present.      Mental Status: He is alert and oriented to person, place, and time.   Psychiatric:         Mood and Affect: Mood normal.         Thought Content: Thought content normal.         Judgment: Judgment normal.         Procedures    No visits with results within 30 Day(s) from this visit.   Latest known visit with results is:   Admission on 03/09/2022, Discharged on 03/09/2022   Component Date Value Ref Range Status   • Glucose 03/09/2022 114 (A) 65 - 99 mg/dL Final   • BUN 03/09/2022 21 (A) 6 - 20 mg/dL Final   • Creatinine 03/09/2022 1.33 (A) 0.76 - 1.27 mg/dL Final   • Sodium 03/09/2022 136  136 - 145 mmol/L Final   • Potassium 03/09/2022 3.7  3.5 - 5.2 mmol/L Final   • Chloride 03/09/2022 106  98 - 107 mmol/L Final   • CO2 03/09/2022 16.8 (A) 22.0 - 29.0 mmol/L Final   • Calcium 03/09/2022 9.1  8.6 - 10.5 mg/dL Final   • Total Protein  03/09/2022 7.2  6.0 - 8.5 g/dL Final   • Albumin 03/09/2022 4.30  3.50 - 5.20 g/dL Final   • ALT (SGPT) 03/09/2022 35  1 - 41 U/L Final   • AST (SGOT) 03/09/2022 26  1 - 40 U/L Final   • Alkaline Phosphatase 03/09/2022 69  39 - 117 U/L Final   • Total Bilirubin 03/09/2022 1.0  0.0 - 1.2 mg/dL Final   • Globulin 03/09/2022 2.9  gm/dL Final   • A/G Ratio 03/09/2022 1.5  g/dL Final   • BUN/Creatinine Ratio 03/09/2022 15.8  7.0 - 25.0 Final   • Anion Gap 03/09/2022 13.2  5.0 - 15.0 mmol/L Final   • eGFR 03/09/2022 74.2  >60.0 mL/min/1.73 Final    National Kidney Foundation and American Society of Nephrology (ASN) Task Force recommended calculation based on the Chronic Kidney Disease Epidemiology Collaboration (CKD-EPI) equation refit without adjustment for race.   • Lipase 03/09/2022 17  13 - 60 U/L Final   • Color, UA 03/09/2022 Yellow  Yellow, Straw Final   • Appearance, UA 03/09/2022 Clear  Clear Final   • pH, UA 03/09/2022 5.5  5.0 - 8.0 Final   • Specific Gravity, UA 03/09/2022 >=1.030  1.005 - 1.030 Final   • Glucose, UA 03/09/2022 Negative  Negative Final   • Ketones, UA 03/09/2022 Negative  Negative Final   • Bilirubin, UA 03/09/2022 Negative  Negative Final   • Blood, UA 03/09/2022 Trace (A) Negative Final   • Protein, UA 03/09/2022 Negative  Negative Final   • Leuk Esterase, UA 03/09/2022 Negative  Negative Final   • Nitrite, UA 03/09/2022 Negative  Negative Final   • Urobilinogen, UA 03/09/2022 1.0 E.U./dL  0.2 - 1.0 E.U./dL Final   • Extra Tube 03/09/2022 Hold for add-ons.   Final    Auto resulted.   • Extra Tube 03/09/2022 hold for add-on   Final    Auto resulted   • Extra Tube 03/09/2022 Hold for add-ons.   Final    Auto resulted.   • Extra Tube 03/09/2022 hold for add-on   Final    Auto resulted   • WBC 03/09/2022 12.55 (A) 3.40 - 10.80 10*3/mm3 Final   • RBC 03/09/2022 5.27  4.14 - 5.80 10*6/mm3 Final   • Hemoglobin 03/09/2022 15.2  13.0 - 17.7 g/dL Final   • Hematocrit 03/09/2022 42.8  37.5 - 51.0 %  Final   • MCV 03/09/2022 81.2  79.0 - 97.0 fL Final   • MCH 03/09/2022 28.8  26.6 - 33.0 pg Final   • MCHC 03/09/2022 35.5  31.5 - 35.7 g/dL Final   • RDW 03/09/2022 11.7 (A) 12.3 - 15.4 % Final   • RDW-SD 03/09/2022 33.8 (A) 37.0 - 54.0 fl Final   • MPV 03/09/2022 11.0  6.0 - 12.0 fL Final   • Platelets 03/09/2022 242  140 - 450 10*3/mm3 Final   • Neutrophil % 03/09/2022 76.9 (A) 42.7 - 76.0 % Final   • Lymphocyte % 03/09/2022 14.0 (A) 19.6 - 45.3 % Final   • Monocyte % 03/09/2022 7.5  5.0 - 12.0 % Final   • Eosinophil % 03/09/2022 0.6  0.3 - 6.2 % Final   • Basophil % 03/09/2022 0.4  0.0 - 1.5 % Final   • Immature Grans % 03/09/2022 0.6 (A) 0.0 - 0.5 % Final   • Neutrophils, Absolute 03/09/2022 9.64 (A) 1.70 - 7.00 10*3/mm3 Final   • Lymphocytes, Absolute 03/09/2022 1.76  0.70 - 3.10 10*3/mm3 Final   • Monocytes, Absolute 03/09/2022 0.94 (A) 0.10 - 0.90 10*3/mm3 Final   • Eosinophils, Absolute 03/09/2022 0.08  0.00 - 0.40 10*3/mm3 Final   • Basophils, Absolute 03/09/2022 0.05  0.00 - 0.20 10*3/mm3 Final   • Immature Grans, Absolute 03/09/2022 0.08 (A) 0.00 - 0.05 10*3/mm3 Final   • nRBC 03/09/2022 0.0  0.0 - 0.2 /100 WBC Final   • RBC, UA 03/09/2022 0-2 (A) None Seen /HPF Final   • WBC, UA 03/09/2022 0-2 (A) None Seen /HPF Final   • Bacteria, UA 03/09/2022 None Seen  None Seen /HPF Final   • Squamous Epithelial Cells,  03/09/2022 0-2  None Seen, 0-2 /HPF Final   • Hyaline Casts, UA 03/09/2022 0-2  None Seen /LPF Final   • Methodology 03/09/2022 Automated Microscopy   Final       ASSESSMENT/ PLAN:    Diagnoses and all orders for this visit:    1. DDD (degenerative disc disease), thoracolumbar (Primary)  Assessment & Plan:  He wants to see the chiropractor see if he can get some additional benefit.  Instructed this may or may not provide any additional benefit.  It surely I do not think would be harmful.  Overall though he also needs to work on some lifestyle changes and some weight loss.  That may provide more  benefit to long-term.    Orders:  -     Ambulatory Referral to Chiropractic    2. Encounter for medical examination to establish care  Assessment & Plan:  [facility]  We will have him continue to [facility] all of [facility].      3. Shortness of breath  Assessment & Plan:  His shortness of breath sounds more like exercise-induced versus anxiety.  We will get a chest x-ray and pulmonary function test.  We will give him a trial of some albuterol in the meantime to use before any type of physical activity.    Orders:  -     Full Pulmonary Function Test With Bronchodilator; Future  -     XR Chest PA & Lateral; Future    Other orders  -     albuterol sulfate  (90 Base) MCG/ACT inhaler; Inhale 2 puffs Every 6 (Six) Hours As Needed for Wheezing or Shortness of Air.  Dispense: 18 g; Refill: 0      Orders Placed Today:     New Medications Ordered This Visit   Medications   • albuterol sulfate  (90 Base) MCG/ACT inhaler     Sig: Inhale 2 puffs Every 6 (Six) Hours As Needed for Wheezing or Shortness of Air.     Dispense:  18 g     Refill:  0        Management Plan:     An After Visit Summary was printed and given to the patient at discharge.    Follow-up: Return in about 6 weeks (around 5/23/2022) for Recheck.    Choco Crowley DO 4/11/2022 11:09 EDT  This note was electronically signed.

## 2022-04-15 ENCOUNTER — PATIENT ROUNDING (BHMG ONLY) (OUTPATIENT)
Dept: FAMILY MEDICINE CLINIC | Facility: CLINIC | Age: 32
End: 2022-04-15

## 2022-04-15 NOTE — PROGRESS NOTES
April 15, 2022    Hello, may I speak with Thai Ball?    My name is Roseline     I am  with [facility]  [address] [place] [address] [address] [place] [address] [phone].       I am calling to officially welcome you to our practice and ask about your recent visit. Is this a good time to talk? Yes     Tell me about your visit with us. What things went well?  It all went pretty well.       We're always looking for ways to make our patients' experiences even better. Do you have recommendations on ways we may improve?  No     Overall were you satisfied with your first visit to our practice? Yes        I appreciate you taking the time to speak with me today. Is there anything else I can do for you? No      Thank you, and have a great day.

## 2022-04-15 NOTE — PROGRESS NOTES
A [employer] MESSAGE HAS BEEN SENT TO THE PATIENT FOR [place] [facility] [place]

## 2022-05-03 ENCOUNTER — TELEPHONE (OUTPATIENT)
Dept: FAMILY MEDICINE CLINIC | Facility: CLINIC | Age: 32
End: 2022-05-03

## 2022-05-03 ENCOUNTER — TELEPHONE (OUTPATIENT)
Dept: UROLOGY | Facility: CLINIC | Age: 32
End: 2022-05-03

## 2022-05-03 NOTE — TELEPHONE ENCOUNTER
Scheduling called to let Dr. Crowley know that Thai cancelled his appt with urology. Stated he was not having any more urinary issues after passing his kidney stone.

## 2022-05-05 ENCOUNTER — TELEPHONE (OUTPATIENT)
Dept: FAMILY MEDICINE CLINIC | Facility: CLINIC | Age: 32
End: 2022-05-05

## 2022-05-05 DIAGNOSIS — R52 PAIN: Primary | ICD-10-CM

## 2022-05-05 NOTE — TELEPHONE ENCOUNTER
Caller: Thai Ball    Relationship to patient: Self    Best call back number: [phone]    Patient is needing: PATIENT IS INQUIRING ABOUT REFERRAL TO CHIROPRACTOR, IT HAS BEEN A MONTH SINCE IT WAS GOING TO BE SENT AND HE HAS NOT HEARD ANYTHING PLEASE CONTACT PATIENT AND ADVISE HE HAS CALLED TWICE TODAY AND UNABLE TO SPEAK WITH CLINICAL TEAM

## 2022-05-26 ENCOUNTER — OFFICE VISIT (OUTPATIENT)
Dept: FAMILY MEDICINE CLINIC | Facility: CLINIC | Age: 32
End: 2022-05-26

## 2022-05-26 VITALS
BODY MASS INDEX: 40.43 KG/M2 | DIASTOLIC BLOOD PRESSURE: 79 MMHG | OXYGEN SATURATION: 98 % | TEMPERATURE: 98.2 F | WEIGHT: 315 LBS | HEART RATE: 76 BPM | SYSTOLIC BLOOD PRESSURE: 123 MMHG | HEIGHT: 74 IN

## 2022-05-26 DIAGNOSIS — M47.812 CERVICAL SPONDYLOSIS: ICD-10-CM

## 2022-05-26 DIAGNOSIS — F41.1 GENERALIZED ANXIETY DISORDER: ICD-10-CM

## 2022-05-26 DIAGNOSIS — R06.02 SHORTNESS OF BREATH: Primary | ICD-10-CM

## 2022-05-26 PROCEDURE — 99213 OFFICE O/P EST LOW 20 MIN: CPT | Performed by: FAMILY MEDICINE

## 2022-05-26 RX ORDER — RIMEGEPANT SULFATE 75 MG/75MG
TABLET, ORALLY DISINTEGRATING ORAL
COMMUNITY
End: 2022-05-26

## 2022-05-26 RX ORDER — DOXEPIN HYDROCHLORIDE 50 MG/G
CREAM TOPICAL
COMMUNITY
End: 2022-05-26

## 2022-05-26 RX ORDER — PROMETHAZINE HYDROCHLORIDE 25 MG/1
TABLET ORAL
COMMUNITY
End: 2022-05-26

## 2022-05-26 RX ORDER — SUMATRIPTAN 50 MG/1
TABLET, FILM COATED ORAL
COMMUNITY
End: 2022-05-26

## 2022-05-26 RX ORDER — NALOXONE HYDROCHLORIDE 4 MG/.1ML
SPRAY NASAL
COMMUNITY
End: 2022-05-26

## 2022-05-26 RX ORDER — DIPHENHYDRAMINE HCL 25 MG
CAPSULE ORAL
COMMUNITY
End: 2022-05-26

## 2022-05-26 RX ORDER — SODIUM FLUORIDE 5 MG/G
GEL, DENTIFRICE DENTAL
COMMUNITY
End: 2022-05-26

## 2022-05-26 RX ORDER — TRAZODONE HYDROCHLORIDE 100 MG/1
TABLET ORAL
COMMUNITY

## 2022-05-26 RX ORDER — LIDOCAINE AND PRILOCAINE 25; 25 MG/G; MG/G
CREAM TOPICAL
COMMUNITY
End: 2022-05-26

## 2022-05-26 RX ORDER — GABAPENTIN 300 MG/1
CAPSULE ORAL
COMMUNITY
Start: 2022-03-17 | End: 2022-05-26 | Stop reason: SDUPTHER

## 2022-05-26 RX ORDER — ONABOTULINUMTOXINA 200 [USP'U]/1
INJECTION, POWDER, LYOPHILIZED, FOR SOLUTION INTRADERMAL; INTRAMUSCULAR
COMMUNITY
End: 2022-05-26

## 2022-05-26 RX ORDER — ESCITALOPRAM OXALATE 10 MG/1
TABLET ORAL
COMMUNITY
End: 2022-05-26

## 2022-05-26 RX ORDER — CYANOCOBALAMIN 1000 UG/ML
INJECTION, SOLUTION INTRAMUSCULAR; SUBCUTANEOUS
COMMUNITY
Start: 2022-03-08 | End: 2022-05-26

## 2022-05-26 RX ORDER — PHENTERMINE HYDROCHLORIDE 37.5 MG/1
37.5 TABLET ORAL DAILY
COMMUNITY
Start: 2022-03-04 | End: 2022-05-26

## 2022-05-26 RX ORDER — ONDANSETRON 4 MG/1
TABLET, ORALLY DISINTEGRATING ORAL
COMMUNITY
End: 2022-05-26

## 2022-05-26 RX ORDER — TADALAFIL 5 MG/1
TABLET ORAL
COMMUNITY
End: 2022-05-26

## 2022-05-26 RX ORDER — HYDROCORTISONE VALERATE CREAM 2 MG/G
CREAM TOPICAL
COMMUNITY
End: 2022-05-26

## 2022-05-26 RX ORDER — PRAZOSIN HYDROCHLORIDE 1 MG/1
CAPSULE ORAL
COMMUNITY
Start: 2022-03-08 | End: 2022-05-26

## 2022-05-26 RX ORDER — TOPIRAMATE 50 MG/1
TABLET, FILM COATED ORAL
COMMUNITY
Start: 2022-02-21 | End: 2022-05-26 | Stop reason: SDUPTHER

## 2022-05-26 RX ORDER — OXYCODONE HYDROCHLORIDE AND ACETAMINOPHEN 5; 325 MG/1; MG/1
TABLET ORAL
COMMUNITY
End: 2022-05-26

## 2022-05-26 RX ORDER — FLUOCINONIDE 0.5 MG/G
OINTMENT TOPICAL
COMMUNITY
End: 2022-05-26

## 2022-05-26 RX ORDER — ACETAMINOPHEN, ASPIRIN, CAFFEINE 250; 65; 250 MG/1; MG/1; MG/1
TABLET, FILM COATED ORAL
COMMUNITY
Start: 2022-02-21

## 2022-05-26 RX ORDER — NAPROXEN 500 MG/1
TABLET ORAL
COMMUNITY
End: 2022-08-17 | Stop reason: ALTCHOICE

## 2022-05-26 RX ORDER — TRIAMCINOLONE ACETONIDE 1 MG/G
CREAM TOPICAL
COMMUNITY
End: 2022-05-26

## 2022-05-26 RX ORDER — TRAZODONE HYDROCHLORIDE 50 MG/1
TABLET ORAL
COMMUNITY
End: 2022-05-26 | Stop reason: SDUPTHER

## 2022-05-26 RX ORDER — DULOXETIN HYDROCHLORIDE 60 MG/1
CAPSULE, DELAYED RELEASE ORAL
COMMUNITY

## 2022-05-26 RX ORDER — KETOCONAZOLE 20 MG/G
CREAM TOPICAL
COMMUNITY
End: 2022-05-26

## 2022-05-26 RX ORDER — LORATADINE 10 MG/1
TABLET ORAL
COMMUNITY
End: 2022-05-26

## 2022-05-26 RX ORDER — TIZANIDINE 2 MG/1
2 TABLET ORAL EVERY 8 HOURS PRN
COMMUNITY
Start: 2022-05-01

## 2022-05-26 RX ORDER — GALCANEZUMAB 120 MG/ML
INJECTION, SOLUTION SUBCUTANEOUS
COMMUNITY

## 2022-05-26 RX ORDER — MELATONIN
COMMUNITY
End: 2023-01-13

## 2022-05-26 RX ORDER — TESTOSTERONE CYPIONATE 200 MG/ML
INJECTION, SOLUTION INTRAMUSCULAR
COMMUNITY
Start: 2022-03-04 | End: 2022-05-26

## 2022-05-26 NOTE — PROGRESS NOTES
Chief Complaint   Patient presents with   • Follow-up     6 WEEK    • Anxiety        Subjective     Thai Ball  has a past medical history of Kidney stone.    Anxiety disorder- he still has some persistent anxiety.  He has been on Cymbalta now for 2 years or more.  States it does not really help dramatically.  [facility].    Shortness of breath- he has not gotten his chest x-ray yet and his pulmonary function test is not scheduled till August he has used the albuterol inhaler and it does give him some benefit.  He uses this about twice a week.      PHQ-2 Depression Screening  Little interest or pleasure in doing things?     Feeling down, depressed, or hopeless?     PHQ-2 Total Score     PHQ-9 Depression Screening  Little interest or pleasure in doing things?     Feeling down, depressed, or hopeless?     Trouble falling or staying asleep, or sleeping too much?     Feeling tired or having little energy?     Poor appetite or overeating?     Feeling bad about yourself - or that you are a failure or have let yourself or your family down?     Trouble concentrating on things, such as reading the newspaper or watching television?     Moving or speaking so slowly that other people could have noticed? Or the opposite - being so fidgety or restless that you have been moving around a lot more than usual?     Thoughts that you would be better off dead, or of hurting yourself in some way?     PHQ-9 Total Score     If you checked off any problems, how difficult have these problems made it for you to do your work, take care of things at home, or get along with other people?       Allergies   Allergen Reactions   • Compazine [Prochlorperazine] Arrhythmia       Prior to Admission medications    Medication Sig Start Date End Date Taking? Authorizing Provider   albuterol sulfate  (90 Base) MCG/ACT inhaler Inhale 2 puffs Every 6 (Six) Hours As Needed for  Wheezing or Shortness of Air. 4/11/22  Yes Choco Crowley,    cetirizine (zyrTEC) 10 MG tablet Take 10 mg by mouth Daily. 2/13/22  Yes Alonso Calhoun MD   cholecalciferol (VITAMIN D3) 25 MCG (1000 UT) tablet Vitamin D3 25 mcg (1,000 unit) tablet   Yes Alonso Calhoun MD   DULoxetine (CYMBALTA) 60 MG capsule duloxetine 60 mg capsule,delayed release   Yes Alonso Calhoun MD   gabapentin (NEURONTIN) 100 MG capsule Take 100 mg by mouth 3 (Three) Times a Day.   Yes ProviderAlonso MD   galcanezumab-gnlm (Emgality) 120 MG/ML auto-injector pen Emgality Pen 120 mg/mL subcutaneous pen injector   ADMINISTER 1 ML UNDER THE SKIN EVERY MONTH AS DIRECTED (90 day supply)   Yes Alonso Calhoun MD   naproxen (NAPROSYN) 500 MG tablet naproxen 500 mg tablet   Yes Alonso Calhoun MD   Pain Reliever Plus 250-250-65 MG per tablet  2/21/22  Yes Alonso Calhoun MD   rizatriptan MLT (MAXALT-MLT) 10 MG disintegrating tablet 10 mg 1 (One) Time As Needed. 2/17/22  Yes Alonso Calhoun MD   tiZANidine (ZANAFLEX) 2 MG tablet Take 2 mg by mouth Every 8 (Eight) Hours As Needed. 5/1/22  Yes Alonso Calhoun MD   topiramate (TOPAMAX) 100 MG tablet Take 100 mg by mouth 2 (Two) Times a Day.   Yes Alonso Calhoun MD   traZODone (DESYREL) 100 MG tablet trazodone 100 mg tablet   Yes ProviderAlonso MD   vitamin D (ERGOCALCIFEROL) 1.25 MG (28233 UT) capsule capsule Take 50,000 Units by mouth 1 (One) Time Per Week.   Yes Alonso Calhoun MD   cyanocobalamin 1000 MCG/ML injection  3/8/22 5/26/22  Alonso Calhoun MD   cyclobenzaprine (FLEXERIL) 10 MG tablet Take 10 mg by mouth 3 (Three) Times a Day As Needed for Muscle Spasms.  5/26/22  Alonso Calhoun MD   diclofenac (VOLTAREN) 50 MG EC tablet Take 1 tablet by mouth 3 (Three) Times a Day As Needed (pain). 3/9/22 5/26/22  Sasha Sanchez APRN   diphenhydrAMINE (BENADRYL) 25 mg capsule Allergy Relief (diphenhydramine)  25 mg capsule  5/26/22  Alonso Calhoun MD   Doxepin HCl 5 % cream doxepin 5 % topical cream   as needed  5/26/22  Alonso Calhoun MD   DULoxetine (CYMBALTA) 60 MG capsule Take 60 mg by mouth Daily. 1/6/22 5/26/22  Alonso Calhoun MD   escitalopram (LEXAPRO) 10 MG tablet escitalopram 10 mg tablet  5/26/22  Alonso Calhoun MD   fluocinonide (LIDEX) 0.05 % ointment fluocinonide 0.05 % topical ointment  5/26/22  Alonso Calhoun MD   gabapentin (NEURONTIN) 300 MG capsule  3/17/22 5/26/22  Alonso Calhoun MD   Galcanezumab-gnlm (Emgality) 120 MG/ML solution prefilled syringe 1 mL.  5/26/22  Alonso Calhoun MD   HYDROcodone-acetaminophen (NORCO) 7.5-325 MG per tablet Take 1 tablet by mouth Every 6 (Six) Hours As Needed for Severe Pain . 3/9/22 5/26/22  Clemente Griffiths MD   hydrocortisone (WESTCORT) 0.2 % cream hydrocortisone valerate 0.2 % topical cream  5/26/22  Alonso Calhoun MD   ketoconazole (NIZORAL) 2 % cream ketoconazole 2 % topical cream  5/26/22  Alonso Calhoun MD   lidocaine-prilocaine (EMLA) 2.5-2.5 % cream lidocaine-prilocaine 2.5 %-2.5 % topical cream   as needed  5/26/22  Alonso Calhoun MD   loratadine (CLARITIN) 10 MG tablet loratadine 10 mg tablet  5/26/22  Alonso Calhoun MD   naloxone (NARCAN) 4 MG/0.1ML nasal spray Narcan 4 mg/actuation nasal spray  5/26/22  Alonso Calhoun MD   OnabotulinumtoxinA (Botox) 200 units reconstituted solution Botox 200 unit injection   INJECT INTO THE MUSCLE BY PRESCRIBER IN OFFICE EVERY 12 WEEKS  FOR CHRONIC MIGRAINE. DISCARD UNUSED PORTION.  5/26/22  Alonso Calhoun MD   ondansetron ODT (ZOFRAN-ODT) 4 MG disintegrating tablet Place 1 tablet on the tongue Every 8 (Eight) Hours As Needed for Nausea or Vomiting. 3/9/22 5/26/22  Sasha Sanchez APRN   ondansetron ODT (ZOFRAN-ODT) 4 MG disintegrating tablet ondansetron 4 mg disintegrating tablet  5/26/22  Provider, MD Alonso    oxyCODONE-acetaminophen (PERCOCET) 5-325 MG per tablet oxycodone-acetaminophen 5 mg-325 mg tablet   TAKE 1 TABLET BY MOUTH SPARINGLY EVERY 6 HOURS FOR 90 DAYS AS NEEDED FOR SEVERE PAIN  5/26/22  Alonso Calhoun MD   phentermine (ADIPEX-P) 37.5 MG tablet Take 37.5 mg by mouth Daily. 3/4/22 5/26/22  Alonso Calhoun MD   phentermine 30 MG capsule Take 30 mg by mouth Every Morning.  5/26/22  Alonso Calhoun MD   prazosin (MINIPRESS) 1 MG capsule  3/8/22 5/26/22  Alonso Calhoun MD   promethazine (PHENERGAN) 25 MG tablet promethazine 25 mg tablet  5/26/22  Alonso Calhoun MD   Rimegepant Sulfate (Nurtec) 75 MG tablet dispersible tablet Nurtec ODT 75 mg disintegrating tablet   Let 1 tab dissolve orally once daily, may repeat in 24 hrs if needed  5/26/22  Alonso Calhoun MD   Sodium Fluoride 1.1 % gel PreviDent 5000 Plus 1.1 % cream  5/26/22  Alonso Calhoun MD   SUMAtriptan (IMITREX) 50 MG tablet sumatriptan 50 mg tablet  5/26/22  Alonso Calhoun MD   tadalafil (CIALIS) 5 MG tablet tadalafil 5 mg tablet  5/26/22  Alonso Calhoun MD   testosterone cypionate (DEPO-TESTOSTERONE) 100 MG/ML solution injection Inject  into the appropriate muscle as directed by prescriber Every 14 (Fourteen) Days.  5/26/22  Alonso Calhoun MD   Testosterone Cypionate (DEPOTESTOTERONE CYPIONATE) 200 MG/ML injection INJECT 1ML INTRAMUSCULARLY ONCE WEEK 3/4/22 5/26/22  Alonso Calhoun MD   topiramate (TOPAMAX) 50 MG tablet  2/21/22 5/26/22  Alonso Calhoun MD   traZODone (DESYREL) 100 MG tablet Take 100 mg by mouth Every Night.  5/26/22  Alonso Calhoun MD   traZODone (DESYREL) 50 MG tablet trazodone 50 mg tablet  5/26/22  Alonso Calhoun MD   triamcinolone (KENALOG) 0.1 % cream triamcinolone acetonide 0.1 % topical cream  5/26/22  Alonso Calhoun MD   vitamin B-12 (CYANOCOBALAMIN) 1000 MCG tablet Take 1,000 mcg by mouth Daily.  5/26/22  Provider,  "Historical, MD        Patient Active Problem List   Diagnosis   • Generalized anxiety disorder   • Migraine without aura and without status migrainosus, not intractable   • DDD (degenerative disc disease), thoracolumbar   • Cervical spondylosis   • Fibromyalgia   • Hypotestosteronemia   • B12 deficiency   • Encounter for medical examination to establish care   • Shortness of breath        History reviewed. No pertinent surgical history.    Social History     Socioeconomic History   • Marital status:    Tobacco Use   • Smoking status: Never Smoker   • Smokeless tobacco: Former User   Vaping Use   • Vaping Use: Every day   Substance and Sexual Activity   • Alcohol use: Never   • Drug use: Never   • Sexual activity: Defer       History reviewed. No pertinent family history.    Family history, surgical history, past medical history, Allergies and med's reviewed with patient today and updated in [facility].     ROS:  Review of Systems   Constitutional: Negative for fatigue.   Respiratory: Positive for shortness of breath. Negative for cough, chest tightness and wheezing.    Cardiovascular: Negative for chest pain and palpitations.   Musculoskeletal: Positive for arthralgias, back pain and neck pain. Negative for myalgias.   Psychiatric/Behavioral: Negative for depressed mood. The patient is nervous/anxious.        OBJECTIVE:  Vitals:    05/26/22 0955   BP: 123/79   BP Location: Right arm   Patient Position: Sitting   Pulse: 76   Temp: 98.2 °F (36.8 °C)   SpO2: 98%   Weight: (!) 143 kg (316 lb)   Height: 188 cm (74\")     No exam data present   Body mass index is 40.57 kg/m².  No LMP for male patient.    Physical Exam  Vitals and nursing note reviewed.   Constitutional:       General: He is not in acute distress.     Appearance: Normal appearance. He is obese.   HENT:      Head: Normocephalic.   Cardiovascular:      Rate and Rhythm: Normal rate and regular rhythm.      Heart sounds: Normal heart sounds. No murmur " heard.  Pulmonary:      Effort: Pulmonary effort is normal.      Breath sounds: Normal breath sounds. No wheezing, rhonchi or rales.   Neurological:      Mental Status: He is alert.         Procedures    No visits with results within 30 Day(s) from this visit.   Latest known visit with results is:   Admission on 03/09/2022, Discharged on 03/09/2022   Component Date Value Ref Range Status   • Glucose 03/09/2022 114 (A) 65 - 99 mg/dL Final   • BUN 03/09/2022 21 (A) 6 - 20 mg/dL Final   • Creatinine 03/09/2022 1.33 (A) 0.76 - 1.27 mg/dL Final   • Sodium 03/09/2022 136  136 - 145 mmol/L Final   • Potassium 03/09/2022 3.7  3.5 - 5.2 mmol/L Final   • Chloride 03/09/2022 106  98 - 107 mmol/L Final   • CO2 03/09/2022 16.8 (A) 22.0 - 29.0 mmol/L Final   • Calcium 03/09/2022 9.1  8.6 - 10.5 mg/dL Final   • Total Protein 03/09/2022 7.2  6.0 - 8.5 g/dL Final   • Albumin 03/09/2022 4.30  3.50 - 5.20 g/dL Final   • ALT (SGPT) 03/09/2022 35  1 - 41 U/L Final   • AST (SGOT) 03/09/2022 26  1 - 40 U/L Final   • Alkaline Phosphatase 03/09/2022 69  39 - 117 U/L Final   • Total Bilirubin 03/09/2022 1.0  0.0 - 1.2 mg/dL Final   • Globulin 03/09/2022 2.9  gm/dL Final   • A/G Ratio 03/09/2022 1.5  g/dL Final   • BUN/Creatinine Ratio 03/09/2022 15.8  7.0 - 25.0 Final   • Anion Gap 03/09/2022 13.2  5.0 - 15.0 mmol/L Final   • eGFR 03/09/2022 74.2  >60.0 mL/min/1.73 Final    National Kidney Foundation and American Society of Nephrology (ASN) Task Force recommended calculation based on the Chronic Kidney Disease Epidemiology Collaboration (CKD-EPI) equation refit without adjustment for race.   • Lipase 03/09/2022 17  13 - 60 U/L Final   • Color, UA 03/09/2022 Yellow  Yellow, Straw Final   • Appearance, UA 03/09/2022 Clear  Clear Final   • pH, UA 03/09/2022 5.5  5.0 - 8.0 Final   • Specific Gravity, UA 03/09/2022 >=1.030  1.005 - 1.030 Final   • Glucose, UA 03/09/2022 Negative  Negative Final   • Ketones, UA 03/09/2022 Negative  Negative Final    • Bilirubin, UA 03/09/2022 Negative  Negative Final   • Blood, UA 03/09/2022 Trace (A) Negative Final   • Protein, UA 03/09/2022 Negative  Negative Final   • Leuk Esterase, UA 03/09/2022 Negative  Negative Final   • Nitrite, UA 03/09/2022 Negative  Negative Final   • Urobilinogen, UA 03/09/2022 1.0 E.U./dL  0.2 - 1.0 E.U./dL Final   • Extra Tube 03/09/2022 Hold for add-ons.   Final    Auto resulted.   • Extra Tube 03/09/2022 hold for add-on   Final    Auto resulted   • Extra Tube 03/09/2022 Hold for add-ons.   Final    Auto resulted.   • Extra Tube 03/09/2022 hold for add-on   Final    Auto resulted   • WBC 03/09/2022 12.55 (A) 3.40 - 10.80 10*3/mm3 Final   • RBC 03/09/2022 5.27  4.14 - 5.80 10*6/mm3 Final   • Hemoglobin 03/09/2022 15.2  13.0 - 17.7 g/dL Final   • Hematocrit 03/09/2022 42.8  37.5 - 51.0 % Final   • MCV 03/09/2022 81.2  79.0 - 97.0 fL Final   • MCH 03/09/2022 28.8  26.6 - 33.0 pg Final   • MCHC 03/09/2022 35.5  31.5 - 35.7 g/dL Final   • RDW 03/09/2022 11.7 (A) 12.3 - 15.4 % Final   • RDW-SD 03/09/2022 33.8 (A) 37.0 - 54.0 fl Final   • MPV 03/09/2022 11.0  6.0 - 12.0 fL Final   • Platelets 03/09/2022 242  140 - 450 10*3/mm3 Final   • Neutrophil % 03/09/2022 76.9 (A) 42.7 - 76.0 % Final   • Lymphocyte % 03/09/2022 14.0 (A) 19.6 - 45.3 % Final   • Monocyte % 03/09/2022 7.5  5.0 - 12.0 % Final   • Eosinophil % 03/09/2022 0.6  0.3 - 6.2 % Final   • Basophil % 03/09/2022 0.4  0.0 - 1.5 % Final   • Immature Grans % 03/09/2022 0.6 (A) 0.0 - 0.5 % Final   • Neutrophils, Absolute 03/09/2022 9.64 (A) 1.70 - 7.00 10*3/mm3 Final   • Lymphocytes, Absolute 03/09/2022 1.76  0.70 - 3.10 10*3/mm3 Final   • Monocytes, Absolute 03/09/2022 0.94 (A) 0.10 - 0.90 10*3/mm3 Final   • Eosinophils, Absolute 03/09/2022 0.08  0.00 - 0.40 10*3/mm3 Final   • Basophils, Absolute 03/09/2022 0.05  0.00 - 0.20 10*3/mm3 Final   • Immature Grans, Absolute 03/09/2022 0.08 (A) 0.00 - 0.05 10*3/mm3 Final   • nRBC 03/09/2022 0.0  0.0 -  0.2 /100 WBC Final   • RBC, UA 03/09/2022 0-2 (A) None Seen /HPF Final   • WBC, UA 03/09/2022 0-2 (A) None Seen /HPF Final   • Bacteria, UA 03/09/2022 None Seen  None Seen /HPF Final   • Squamous Epithelial Cells, UA 03/09/2022 0-2  None Seen, 0-2 /HPF Final   • Hyaline Casts, UA 03/09/2022 0-2  None Seen /LPF Final   • Methodology 03/09/2022 Automated Microscopy   Final       ASSESSMENT/ PLAN:    Diagnoses and all orders for this visit:    1. Shortness of breath (Primary)  Assessment & Plan:  He does get some benefit with the albuterol inhaler on an as-needed basis.  Currently its only about twice a week on an as-needed basis.  He is going to go get his chest x-ray, but unfortunately his pulmonary function test is not until August.      2. Cervical spondylosis  Assessment & Plan:  [facility] and has [facility].  He does not have a follow-up visit for about another 4 months.      3. Generalized anxiety disorder  Assessment & Plan:  His anxiety is persistent.  Initially he had tried Celexa as well as Lexapro and is currently on Cymbalta.  None of these are really gave him a lot of benefit.  He has a televisit coming up here in the next week or so.  He may want to try an alternative medication such as Effexor.        Orders Placed Today:     No orders of the defined types were placed in this encounter.       Management Plan:     An After Visit Summary was printed and given to the patient at discharge.    Follow-up: Return in about 3 months (around 8/26/2022) for Recheck.    Choco Crowley,  5/26/2022 10:31 EDT  This note was electronically signed.  Answers for HPI/ROS submitted by the patient on 5/19/2022  What is the primary reason for your visit?: Other  Please describe your symptoms.: Follow up appointment.  Have you had these symptoms before?: Yes  How long have you been having these symptoms?: 1-4 days  Please list any medications you are  currently taking for this condition.: N/a  Please describe any probable cause for these symptoms. : N/a

## 2022-05-26 NOTE — ASSESSMENT & PLAN NOTE
He does get some benefit with the albuterol inhaler on an as-needed basis.  Currently its only about twice a week on an as-needed basis.  He is going to go get his chest x-ray, but unfortunately his pulmonary function test is not until August.

## 2022-05-26 NOTE — ASSESSMENT & PLAN NOTE
Currently [facility] and has gotten epidural injections.  He does not have a follow-up visit for about another 4 months.

## 2022-08-01 ENCOUNTER — HOSPITAL ENCOUNTER (OUTPATIENT)
Dept: GENERAL RADIOLOGY | Facility: HOSPITAL | Age: 32
Discharge: HOME | End: 2022-08-01
Admitting: FAMILY MEDICINE

## 2022-08-01 DIAGNOSIS — R06.02 SHORTNESS OF BREATH: ICD-10-CM

## 2022-08-01 PROCEDURE — 71046 X-RAY EXAM CHEST 2 VIEWS: CPT

## 2022-08-03 ENCOUNTER — HOSPITAL ENCOUNTER (OUTPATIENT)
Dept: RESPIRATORY THERAPY | Facility: HOSPITAL | Age: 32
Discharge: HOME | End: 2022-08-03
Admitting: FAMILY MEDICINE

## 2022-08-03 DIAGNOSIS — R06.02 SHORTNESS OF BREATH: ICD-10-CM

## 2022-08-03 PROCEDURE — 94060 EVALUATION OF WHEEZING: CPT

## 2022-08-03 PROCEDURE — 94726 PLETHYSMOGRAPHY LUNG VOLUMES: CPT

## 2022-08-03 PROCEDURE — 94726 PLETHYSMOGRAPHY LUNG VOLUMES: CPT | Performed by: INTERNAL MEDICINE

## 2022-08-03 PROCEDURE — 94060 EVALUATION OF WHEEZING: CPT | Performed by: INTERNAL MEDICINE

## 2022-08-03 RX ORDER — ALBUTEROL SULFATE 2.5 MG/3ML
2.5 SOLUTION RESPIRATORY (INHALATION) ONCE
Status: COMPLETED | OUTPATIENT
Start: 2022-08-03 | End: 2022-08-03

## 2022-08-03 RX ADMIN — ALBUTEROL SULFATE 2.5 MG: 2.5 SOLUTION RESPIRATORY (INHALATION) at 11:22

## 2022-08-17 ENCOUNTER — HOSPITAL ENCOUNTER (EMERGENCY)
Facility: HOSPITAL | Age: 32
Discharge: HOME | End: 2022-08-17
Attending: EMERGENCY MEDICINE | Admitting: EMERGENCY MEDICINE

## 2022-08-17 ENCOUNTER — APPOINTMENT (OUTPATIENT)
Dept: CT IMAGING | Facility: HOSPITAL | Age: 32
End: 2022-08-17

## 2022-08-17 VITALS
RESPIRATION RATE: 18 BRPM | WEIGHT: 315 LBS | HEART RATE: 77 BPM | SYSTOLIC BLOOD PRESSURE: 138 MMHG | OXYGEN SATURATION: 98 % | TEMPERATURE: 97.6 F | DIASTOLIC BLOOD PRESSURE: 95 MMHG | HEIGHT: 74 IN | BODY MASS INDEX: 40.43 KG/M2

## 2022-08-17 DIAGNOSIS — R10.9 LEFT FLANK PAIN: Primary | ICD-10-CM

## 2022-08-17 DIAGNOSIS — R31.9 HEMATURIA, UNSPECIFIED TYPE: ICD-10-CM

## 2022-08-17 DIAGNOSIS — N23 RENAL COLIC: ICD-10-CM

## 2022-08-17 DIAGNOSIS — M54.50 BILATERAL LOW BACK PAIN WITHOUT SCIATICA, UNSPECIFIED CHRONICITY: ICD-10-CM

## 2022-08-17 LAB
ALBUMIN SERPL-MCNC: 4.5 G/DL (ref 3.5–5.2)
ALBUMIN/GLOB SERPL: 1.5 G/DL
ALP SERPL-CCNC: 83 U/L (ref 39–117)
ALT SERPL W P-5'-P-CCNC: 49 U/L (ref 1–41)
ANION GAP SERPL CALCULATED.3IONS-SCNC: 13.2 MMOL/L (ref 5–15)
AST SERPL-CCNC: 25 U/L (ref 1–40)
BACTERIA UR QL AUTO: ABNORMAL /HPF
BASOPHILS # BLD AUTO: 0.09 10*3/MM3 (ref 0–0.2)
BASOPHILS NFR BLD AUTO: 1 % (ref 0–1.5)
BILIRUB SERPL-MCNC: 0.5 MG/DL (ref 0–1.2)
BILIRUB UR QL STRIP: NEGATIVE
BUN SERPL-MCNC: 21 MG/DL (ref 6–20)
BUN/CREAT SERPL: 21.2 (ref 7–25)
CALCIUM SPEC-SCNC: 9.8 MG/DL (ref 8.6–10.5)
CHLORIDE SERPL-SCNC: 110 MMOL/L (ref 98–107)
CLARITY UR: CLEAR
CO2 SERPL-SCNC: 16.8 MMOL/L (ref 22–29)
COLOR UR: YELLOW
CREAT SERPL-MCNC: 0.99 MG/DL (ref 0.76–1.27)
DEPRECATED RDW RBC AUTO: 36.5 FL (ref 37–54)
EGFRCR SERPLBLD CKD-EPI 2021: 105.8 ML/MIN/1.73
EOSINOPHIL # BLD AUTO: 0.34 10*3/MM3 (ref 0–0.4)
EOSINOPHIL NFR BLD AUTO: 3.6 % (ref 0.3–6.2)
ERYTHROCYTE [DISTWIDTH] IN BLOOD BY AUTOMATED COUNT: 12.1 % (ref 12.3–15.4)
GLOBULIN UR ELPH-MCNC: 3 GM/DL
GLUCOSE SERPL-MCNC: 113 MG/DL (ref 65–99)
GLUCOSE UR STRIP-MCNC: NEGATIVE MG/DL
HCT VFR BLD AUTO: 44 % (ref 37.5–51)
HGB BLD-MCNC: 15.3 G/DL (ref 13–17.7)
HGB UR QL STRIP.AUTO: ABNORMAL
HOLD SPECIMEN: NORMAL
HOLD SPECIMEN: NORMAL
HYALINE CASTS UR QL AUTO: ABNORMAL /LPF
IMM GRANULOCYTES # BLD AUTO: 0.08 10*3/MM3 (ref 0–0.05)
IMM GRANULOCYTES NFR BLD AUTO: 0.8 % (ref 0–0.5)
KETONES UR QL STRIP: NEGATIVE
LEUKOCYTE ESTERASE UR QL STRIP.AUTO: ABNORMAL
LIPASE SERPL-CCNC: 27 U/L (ref 13–60)
LYMPHOCYTES # BLD AUTO: 2.16 10*3/MM3 (ref 0.7–3.1)
LYMPHOCYTES NFR BLD AUTO: 22.9 % (ref 19.6–45.3)
MCH RBC QN AUTO: 29.3 PG (ref 26.6–33)
MCHC RBC AUTO-ENTMCNC: 34.8 G/DL (ref 31.5–35.7)
MCV RBC AUTO: 84.1 FL (ref 79–97)
MONOCYTES # BLD AUTO: 0.59 10*3/MM3 (ref 0.1–0.9)
MONOCYTES NFR BLD AUTO: 6.3 % (ref 5–12)
NEUTROPHILS NFR BLD AUTO: 6.16 10*3/MM3 (ref 1.7–7)
NEUTROPHILS NFR BLD AUTO: 65.4 % (ref 42.7–76)
NITRITE UR QL STRIP: NEGATIVE
NRBC BLD AUTO-RTO: 0 /100 WBC (ref 0–0.2)
PH UR STRIP.AUTO: 5.5 [PH] (ref 5–8)
PLATELET # BLD AUTO: 266 10*3/MM3 (ref 140–450)
PMV BLD AUTO: 11.7 FL (ref 6–12)
POTASSIUM SERPL-SCNC: 4.3 MMOL/L (ref 3.5–5.2)
PROT SERPL-MCNC: 7.5 G/DL (ref 6–8.5)
PROT UR QL STRIP: NEGATIVE
RBC # BLD AUTO: 5.23 10*6/MM3 (ref 4.14–5.8)
RBC # UR STRIP: ABNORMAL /HPF
REF LAB TEST METHOD: ABNORMAL
SODIUM SERPL-SCNC: 140 MMOL/L (ref 136–145)
SP GR UR STRIP: 1.02 (ref 1–1.03)
SQUAMOUS #/AREA URNS HPF: ABNORMAL /HPF
UROBILINOGEN UR QL STRIP: ABNORMAL
WBC # UR STRIP: ABNORMAL /HPF
WBC NRBC COR # BLD: 9.42 10*3/MM3 (ref 3.4–10.8)
WHOLE BLOOD HOLD COAG: NORMAL
WHOLE BLOOD HOLD SPECIMEN: NORMAL

## 2022-08-17 PROCEDURE — 36415 COLL VENOUS BLD VENIPUNCTURE: CPT

## 2022-08-17 PROCEDURE — 96375 TX/PRO/DX INJ NEW DRUG ADDON: CPT

## 2022-08-17 PROCEDURE — 25010000002 ONDANSETRON PER 1 MG

## 2022-08-17 PROCEDURE — 74177 CT ABD & PELVIS W/CONTRAST: CPT

## 2022-08-17 PROCEDURE — 0 IOPAMIDOL PER 1 ML: Performed by: EMERGENCY MEDICINE

## 2022-08-17 PROCEDURE — 83690 ASSAY OF LIPASE: CPT

## 2022-08-17 PROCEDURE — 96374 THER/PROPH/DIAG INJ IV PUSH: CPT

## 2022-08-17 PROCEDURE — 80053 COMPREHEN METABOLIC PANEL: CPT

## 2022-08-17 PROCEDURE — 81001 URINALYSIS AUTO W/SCOPE: CPT

## 2022-08-17 PROCEDURE — 25010000002 KETOROLAC TROMETHAMINE PER 15 MG

## 2022-08-17 PROCEDURE — 99283 EMERGENCY DEPT VISIT LOW MDM: CPT

## 2022-08-17 PROCEDURE — 85025 COMPLETE CBC W/AUTO DIFF WBC: CPT

## 2022-08-17 RX ORDER — KETOROLAC TROMETHAMINE 30 MG/ML
30 INJECTION, SOLUTION INTRAMUSCULAR; INTRAVENOUS ONCE
Status: COMPLETED | OUTPATIENT
Start: 2022-08-17 | End: 2022-08-17

## 2022-08-17 RX ORDER — TRAMADOL HYDROCHLORIDE 50 MG/1
50 TABLET ORAL ONCE
Status: COMPLETED | OUTPATIENT
Start: 2022-08-17 | End: 2022-08-17

## 2022-08-17 RX ORDER — SODIUM CHLORIDE 0.9 % (FLUSH) 0.9 %
10 SYRINGE (ML) INJECTION AS NEEDED
Status: DISCONTINUED | OUTPATIENT
Start: 2022-08-17 | End: 2022-08-17 | Stop reason: HOSPADM

## 2022-08-17 RX ORDER — ONDANSETRON 2 MG/ML
4 INJECTION INTRAMUSCULAR; INTRAVENOUS ONCE
Status: COMPLETED | OUTPATIENT
Start: 2022-08-17 | End: 2022-08-17

## 2022-08-17 RX ADMIN — SODIUM CHLORIDE 1000 ML: 9 INJECTION, SOLUTION INTRAVENOUS at 13:08

## 2022-08-17 RX ADMIN — KETOROLAC TROMETHAMINE 30 MG: 30 INJECTION, SOLUTION INTRAMUSCULAR; INTRAVENOUS at 13:07

## 2022-08-17 RX ADMIN — TRAMADOL HYDROCHLORIDE 50 MG: 50 TABLET, COATED ORAL at 15:46

## 2022-08-17 RX ADMIN — ONDANSETRON 4 MG: 2 INJECTION INTRAMUSCULAR; INTRAVENOUS at 13:08

## 2022-08-17 RX ADMIN — IOPAMIDOL 100 ML: 755 INJECTION, SOLUTION INTRAVENOUS at 13:19
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
## 2022-08-17 NOTE — DISCHARGE INSTRUCTIONS
Called and spoke with patient about needing to postpone surgery with Dr. Goncalves. Patient aware a surgery scheduler will reach out when able to reschedule   Please follow-up with the urologist whose information has been provided for you.  Or you may follow-up with the urologist that you were previously referred to if needed.  It is very important that you follow-up with them due to the fact that you have had to pull kidney stones within a short amount of time.  Please note that your CT scan today showed evidence of a kidney stone that has passed.  There is no obvious stone present at this time.  You may continue to have a small amount of blood in your urine until you have completely healed from passing the stone.  Please take the medications prescribed you today as directed.  Return to the ER if you develop worsening of pain, an increase of blood in your urine, a fever that cannot be controlled with Tylenol or Motrin, or severe nausea, vomiting, or diarrhea.

## 2022-08-22 ENCOUNTER — OFFICE VISIT (OUTPATIENT)
Dept: FAMILY MEDICINE CLINIC | Facility: CLINIC | Age: 32
End: 2022-08-22

## 2022-08-22 VITALS
DIASTOLIC BLOOD PRESSURE: 71 MMHG | HEART RATE: 70 BPM | WEIGHT: 315 LBS | TEMPERATURE: 97 F | SYSTOLIC BLOOD PRESSURE: 136 MMHG | BODY MASS INDEX: 40.43 KG/M2 | OXYGEN SATURATION: 97 % | HEIGHT: 74 IN

## 2022-08-22 DIAGNOSIS — R35.0 URINARY FREQUENCY: ICD-10-CM

## 2022-08-22 DIAGNOSIS — N20.0 KIDNEY STONE: ICD-10-CM

## 2022-08-22 DIAGNOSIS — F41.1 GENERALIZED ANXIETY DISORDER: Primary | ICD-10-CM

## 2022-08-22 LAB
BILIRUB BLD-MCNC: NEGATIVE MG/DL
CLARITY, POC: CLEAR
COLOR UR: YELLOW
EXPIRATION DATE: ABNORMAL
GLUCOSE UR STRIP-MCNC: NEGATIVE MG/DL
KETONES UR QL: NEGATIVE
LEUKOCYTE EST, POC: NEGATIVE
Lab: ABNORMAL
NITRITE UR-MCNC: NEGATIVE MG/ML
PH UR: 5.5 [PH] (ref 5–8)
PROT UR STRIP-MCNC: NEGATIVE MG/DL
RBC # UR STRIP: ABNORMAL /UL
SP GR UR: 1.03 (ref 1–1.03)
UROBILINOGEN UR QL: ABNORMAL

## 2022-08-22 PROCEDURE — 81003 URINALYSIS AUTO W/O SCOPE: CPT | Performed by: FAMILY MEDICINE

## 2022-08-22 PROCEDURE — 99213 OFFICE O/P EST LOW 20 MIN: CPT | Performed by: FAMILY MEDICINE

## 2022-08-22 RX ORDER — TIZANIDINE 4 MG/1
TABLET ORAL
COMMUNITY
Start: 2022-07-29 | End: 2022-08-22 | Stop reason: SDUPTHER

## 2022-08-22 RX ORDER — TOPIRAMATE 50 MG/1
TABLET, FILM COATED ORAL
COMMUNITY
Start: 2022-07-18 | End: 2022-08-22 | Stop reason: DRUGHIGH
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
## 2022-09-08 ENCOUNTER — APPOINTMENT (OUTPATIENT)
Dept: CT IMAGING | Facility: HOSPITAL | Age: 32
End: 2022-09-08

## 2022-09-08 VITALS
RESPIRATION RATE: 18 BRPM | HEIGHT: 74 IN | SYSTOLIC BLOOD PRESSURE: 136 MMHG | BODY MASS INDEX: 40.43 KG/M2 | HEART RATE: 80 BPM | OXYGEN SATURATION: 99 % | DIASTOLIC BLOOD PRESSURE: 83 MMHG | TEMPERATURE: 98 F | WEIGHT: 315 LBS

## 2022-09-08 LAB
ALBUMIN SERPL-MCNC: 4.5 G/DL (ref 3.5–5.2)
ALBUMIN/GLOB SERPL: 1.5 G/DL
ALP SERPL-CCNC: 86 U/L (ref 39–117)
ALT SERPL W P-5'-P-CCNC: 64 U/L (ref 1–41)
ANION GAP SERPL CALCULATED.3IONS-SCNC: 10.8 MMOL/L (ref 5–15)
AST SERPL-CCNC: 35 U/L (ref 1–40)
BACTERIA UR QL AUTO: ABNORMAL /HPF
BASOPHILS # BLD AUTO: 0.08 10*3/MM3 (ref 0–0.2)
BASOPHILS NFR BLD AUTO: 0.7 % (ref 0–1.5)
BILIRUB SERPL-MCNC: 0.7 MG/DL (ref 0–1.2)
BILIRUB UR QL STRIP: NEGATIVE
BUN SERPL-MCNC: 14 MG/DL (ref 6–20)
BUN/CREAT SERPL: 15.1 (ref 7–25)
CALCIUM SPEC-SCNC: 9.2 MG/DL (ref 8.6–10.5)
CHLORIDE SERPL-SCNC: 107 MMOL/L (ref 98–107)
CLARITY UR: ABNORMAL
CO2 SERPL-SCNC: 21.2 MMOL/L (ref 22–29)
COLOR UR: YELLOW
CREAT SERPL-MCNC: 0.93 MG/DL (ref 0.76–1.27)
DEPRECATED RDW RBC AUTO: 35.8 FL (ref 37–54)
EGFRCR SERPLBLD CKD-EPI 2021: 113.3 ML/MIN/1.73
EOSINOPHIL # BLD AUTO: 0.16 10*3/MM3 (ref 0–0.4)
EOSINOPHIL NFR BLD AUTO: 1.5 % (ref 0.3–6.2)
ERYTHROCYTE [DISTWIDTH] IN BLOOD BY AUTOMATED COUNT: 11.8 % (ref 12.3–15.4)
GLOBULIN UR ELPH-MCNC: 3 GM/DL
GLUCOSE SERPL-MCNC: 114 MG/DL (ref 65–99)
GLUCOSE UR STRIP-MCNC: NEGATIVE MG/DL
HCT VFR BLD AUTO: 44.4 % (ref 37.5–51)
HGB BLD-MCNC: 15.4 G/DL (ref 13–17.7)
HGB UR QL STRIP.AUTO: NEGATIVE
HOLD SPECIMEN: NORMAL
HOLD SPECIMEN: NORMAL
HYALINE CASTS UR QL AUTO: ABNORMAL /LPF
IMM GRANULOCYTES # BLD AUTO: 0.09 10*3/MM3 (ref 0–0.05)
IMM GRANULOCYTES NFR BLD AUTO: 0.8 % (ref 0–0.5)
KETONES UR QL STRIP: NEGATIVE
LEUKOCYTE ESTERASE UR QL STRIP.AUTO: ABNORMAL
LIPASE SERPL-CCNC: 23 U/L (ref 13–60)
LYMPHOCYTES # BLD AUTO: 2.88 10*3/MM3 (ref 0.7–3.1)
LYMPHOCYTES NFR BLD AUTO: 26.6 % (ref 19.6–45.3)
MCH RBC QN AUTO: 29.3 PG (ref 26.6–33)
MCHC RBC AUTO-ENTMCNC: 34.7 G/DL (ref 31.5–35.7)
MCV RBC AUTO: 84.6 FL (ref 79–97)
MONOCYTES # BLD AUTO: 0.7 10*3/MM3 (ref 0.1–0.9)
MONOCYTES NFR BLD AUTO: 6.5 % (ref 5–12)
NEUTROPHILS NFR BLD AUTO: 6.9 10*3/MM3 (ref 1.7–7)
NEUTROPHILS NFR BLD AUTO: 63.9 % (ref 42.7–76)
NITRITE UR QL STRIP: NEGATIVE
NRBC BLD AUTO-RTO: 0 /100 WBC (ref 0–0.2)
PH UR STRIP.AUTO: 8 [PH] (ref 5–8)
PLATELET # BLD AUTO: 286 10*3/MM3 (ref 140–450)
PMV BLD AUTO: 11.3 FL (ref 6–12)
POTASSIUM SERPL-SCNC: 3.7 MMOL/L (ref 3.5–5.2)
PROT SERPL-MCNC: 7.5 G/DL (ref 6–8.5)
PROT UR QL STRIP: NEGATIVE
RBC # BLD AUTO: 5.25 10*6/MM3 (ref 4.14–5.8)
RBC # UR STRIP: ABNORMAL /HPF
REF LAB TEST METHOD: ABNORMAL
SODIUM SERPL-SCNC: 139 MMOL/L (ref 136–145)
SP GR UR STRIP: 1.02 (ref 1–1.03)
SQUAMOUS #/AREA URNS HPF: ABNORMAL /HPF
UROBILINOGEN UR QL STRIP: ABNORMAL
WBC # UR STRIP: ABNORMAL /HPF
WBC NRBC COR # BLD: 10.81 10*3/MM3 (ref 3.4–10.8)
WHOLE BLOOD HOLD COAG: NORMAL
WHOLE BLOOD HOLD SPECIMEN: NORMAL

## 2022-09-08 PROCEDURE — 99283 EMERGENCY DEPT VISIT LOW MDM: CPT

## 2022-09-08 PROCEDURE — 81001 URINALYSIS AUTO W/SCOPE: CPT

## 2022-09-08 PROCEDURE — 74176 CT ABD & PELVIS W/O CONTRAST: CPT

## 2022-09-08 PROCEDURE — 83690 ASSAY OF LIPASE: CPT

## 2022-09-08 PROCEDURE — 36415 COLL VENOUS BLD VENIPUNCTURE: CPT

## 2022-09-08 PROCEDURE — 80053 COMPREHEN METABOLIC PANEL: CPT

## 2022-09-08 PROCEDURE — 85025 COMPLETE CBC W/AUTO DIFF WBC: CPT

## 2022-09-08 RX ORDER — SODIUM CHLORIDE 0.9 % (FLUSH) 0.9 %
10 SYRINGE (ML) INJECTION AS NEEDED
Status: DISCONTINUED | OUTPATIENT
Start: 2022-09-08 | End: 2022-09-09 | Stop reason: HOSPADM

## 2022-09-09 ENCOUNTER — HOSPITAL ENCOUNTER (EMERGENCY)
Facility: HOSPITAL | Age: 32
Discharge: HOME | End: 2022-09-09
Attending: EMERGENCY MEDICINE | Admitting: EMERGENCY MEDICINE

## 2022-09-09 DIAGNOSIS — R10.9 LEFT FLANK PAIN: Primary | ICD-10-CM

## 2022-09-09 DIAGNOSIS — N39.0 ACUTE UTI: ICD-10-CM

## 2022-09-09 RX ORDER — CEPHALEXIN 500 MG/1
500 CAPSULE ORAL 2 TIMES DAILY
Qty: 14 CAPSULE | Refills: 0 | Status: SHIPPED | OUTPATIENT
Start: 2022-09-09 | End: 2022-09-16

## 2022-09-09 RX ORDER — KETOROLAC TROMETHAMINE 10 MG/1
10 TABLET, FILM COATED ORAL EVERY 6 HOURS PRN
Qty: 15 TABLET | Refills: 0 | OUTPATIENT
Start: 2022-09-09 | End: 2023-01-13

## 2022-09-09 NOTE — DISCHARGE INSTRUCTIONS
Take full course of antibiotics as prescribed. Take toradol as needed for pain control. Return to the ED if you have any worsening pain or other symptoms concerning to you.
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
## 2022-10-20 NOTE — ASSESSMENT & PLAN NOTE
Anxiety is persistent.  [facility]  
He is recently passed another kidney stone.  He states his symptoms are improved except for urinary frequency.  
This may just be related to persistent overhydration.  We will recheck his urine.  His urine was really only remarkable for small amount of blood.  His frequency may be related to overhydration.  
[NL] : warm, clear

## 2022-12-23 ENCOUNTER — HOSPITAL ENCOUNTER (EMERGENCY)
Facility: HOSPITAL | Age: 32
Discharge: HOME | End: 2022-12-23
Attending: EMERGENCY MEDICINE | Admitting: EMERGENCY MEDICINE

## 2022-12-23 ENCOUNTER — APPOINTMENT (OUTPATIENT)
Dept: CT IMAGING | Facility: HOSPITAL | Age: 32
End: 2022-12-23

## 2022-12-23 VITALS
TEMPERATURE: 98.1 F | HEIGHT: 74 IN | WEIGHT: 315 LBS | OXYGEN SATURATION: 96 % | RESPIRATION RATE: 18 BRPM | SYSTOLIC BLOOD PRESSURE: 118 MMHG | BODY MASS INDEX: 40.43 KG/M2 | HEART RATE: 85 BPM | DIASTOLIC BLOOD PRESSURE: 87 MMHG

## 2022-12-23 DIAGNOSIS — R19.7 DIARRHEA, UNSPECIFIED TYPE: ICD-10-CM

## 2022-12-23 DIAGNOSIS — R11.2 NAUSEA AND VOMITING, UNSPECIFIED VOMITING TYPE: Primary | ICD-10-CM

## 2022-12-23 LAB
ALBUMIN SERPL-MCNC: 4.8 G/DL (ref 3.5–5.2)
ALBUMIN/GLOB SERPL: 1.4 G/DL
ALP SERPL-CCNC: 107 U/L (ref 39–117)
ALT SERPL W P-5'-P-CCNC: 70 U/L (ref 1–41)
ANION GAP SERPL CALCULATED.3IONS-SCNC: 14.7 MMOL/L (ref 5–15)
AST SERPL-CCNC: 41 U/L (ref 1–40)
BACTERIA UR QL AUTO: ABNORMAL /HPF
BASOPHILS # BLD AUTO: 0.05 10*3/MM3 (ref 0–0.2)
BASOPHILS NFR BLD AUTO: 0.3 % (ref 0–1.5)
BILIRUB SERPL-MCNC: 0.9 MG/DL (ref 0–1.2)
BILIRUB UR QL STRIP: ABNORMAL
BUN SERPL-MCNC: 19 MG/DL (ref 6–20)
BUN/CREAT SERPL: 19.4 (ref 7–25)
CALCIUM SPEC-SCNC: 9.2 MG/DL (ref 8.6–10.5)
CHLORIDE SERPL-SCNC: 103 MMOL/L (ref 98–107)
CLARITY UR: CLEAR
CO2 SERPL-SCNC: 19.3 MMOL/L (ref 22–29)
COLOR UR: ABNORMAL
CREAT SERPL-MCNC: 0.98 MG/DL (ref 0.76–1.27)
D-LACTATE SERPL-SCNC: 1.1 MMOL/L (ref 0.5–2)
DEPRECATED RDW RBC AUTO: 35.1 FL (ref 37–54)
EGFRCR SERPLBLD CKD-EPI 2021: 106.4 ML/MIN/1.73
EOSINOPHIL # BLD AUTO: 0.26 10*3/MM3 (ref 0–0.4)
EOSINOPHIL NFR BLD AUTO: 1.6 % (ref 0.3–6.2)
ERYTHROCYTE [DISTWIDTH] IN BLOOD BY AUTOMATED COUNT: 11.6 % (ref 12.3–15.4)
FLUAV AG NPH QL: NEGATIVE
FLUBV AG NPH QL IA: NEGATIVE
GLOBULIN UR ELPH-MCNC: 3.4 GM/DL
GLUCOSE SERPL-MCNC: 110 MG/DL (ref 65–99)
GLUCOSE UR STRIP-MCNC: NEGATIVE MG/DL
HCT VFR BLD AUTO: 49.8 % (ref 37.5–51)
HGB BLD-MCNC: 17.7 G/DL (ref 13–17.7)
HGB UR QL STRIP.AUTO: NEGATIVE
HOLD SPECIMEN: NORMAL
HOLD SPECIMEN: NORMAL
HYALINE CASTS UR QL AUTO: ABNORMAL /LPF
IMM GRANULOCYTES # BLD AUTO: 0.14 10*3/MM3 (ref 0–0.05)
IMM GRANULOCYTES NFR BLD AUTO: 0.8 % (ref 0–0.5)
KETONES UR QL STRIP: ABNORMAL
LEUKOCYTE ESTERASE UR QL STRIP.AUTO: ABNORMAL
LIPASE SERPL-CCNC: 17 U/L (ref 13–60)
LYMPHOCYTES # BLD AUTO: 1.64 10*3/MM3 (ref 0.7–3.1)
LYMPHOCYTES NFR BLD AUTO: 9.9 % (ref 19.6–45.3)
MCH RBC QN AUTO: 29.5 PG (ref 26.6–33)
MCHC RBC AUTO-ENTMCNC: 35.5 G/DL (ref 31.5–35.7)
MCV RBC AUTO: 83.1 FL (ref 79–97)
MONOCYTES # BLD AUTO: 0.74 10*3/MM3 (ref 0.1–0.9)
MONOCYTES NFR BLD AUTO: 4.5 % (ref 5–12)
MUCOUS THREADS URNS QL MICRO: ABNORMAL /HPF
NEUTROPHILS NFR BLD AUTO: 13.67 10*3/MM3 (ref 1.7–7)
NEUTROPHILS NFR BLD AUTO: 82.9 % (ref 42.7–76)
NITRITE UR QL STRIP: NEGATIVE
NRBC BLD AUTO-RTO: 0 /100 WBC (ref 0–0.2)
PH UR STRIP.AUTO: 5.5 [PH] (ref 5–8)
PLATELET # BLD AUTO: 296 10*3/MM3 (ref 140–450)
PMV BLD AUTO: 11.1 FL (ref 6–12)
POTASSIUM SERPL-SCNC: 4.4 MMOL/L (ref 3.5–5.2)
PROT SERPL-MCNC: 8.2 G/DL (ref 6–8.5)
PROT UR QL STRIP: ABNORMAL
RBC # BLD AUTO: 5.99 10*6/MM3 (ref 4.14–5.8)
RBC # UR STRIP: ABNORMAL /HPF
REF LAB TEST METHOD: ABNORMAL
SARS-COV-2 RNA PNL SPEC NAA+PROBE: NOT DETECTED
SODIUM SERPL-SCNC: 137 MMOL/L (ref 136–145)
SP GR UR STRIP: >1.03 (ref 1–1.03)
SQUAMOUS #/AREA URNS HPF: ABNORMAL /HPF
UROBILINOGEN UR QL STRIP: ABNORMAL
WBC # UR STRIP: ABNORMAL /HPF
WBC NRBC COR # BLD: 16.5 10*3/MM3 (ref 3.4–10.8)
WHOLE BLOOD HOLD COAG: NORMAL
WHOLE BLOOD HOLD SPECIMEN: NORMAL

## 2022-12-23 PROCEDURE — 83690 ASSAY OF LIPASE: CPT | Performed by: EMERGENCY MEDICINE

## 2022-12-23 PROCEDURE — 0 IOPAMIDOL PER 1 ML: Performed by: EMERGENCY MEDICINE

## 2022-12-23 PROCEDURE — 80053 COMPREHEN METABOLIC PANEL: CPT | Performed by: EMERGENCY MEDICINE

## 2022-12-23 PROCEDURE — 85025 COMPLETE CBC W/AUTO DIFF WBC: CPT | Performed by: EMERGENCY MEDICINE

## 2022-12-23 PROCEDURE — 74177 CT ABD & PELVIS W/CONTRAST: CPT

## 2022-12-23 PROCEDURE — 96372 THER/PROPH/DIAG INJ SC/IM: CPT

## 2022-12-23 PROCEDURE — C9803 HOPD COVID-19 SPEC COLLECT: HCPCS | Performed by: EMERGENCY MEDICINE

## 2022-12-23 PROCEDURE — 81001 URINALYSIS AUTO W/SCOPE: CPT | Performed by: EMERGENCY MEDICINE

## 2022-12-23 PROCEDURE — 25010000002 DICYCLOMINE PER 20 MG: Performed by: EMERGENCY MEDICINE

## 2022-12-23 PROCEDURE — 99283 EMERGENCY DEPT VISIT LOW MDM: CPT

## 2022-12-23 PROCEDURE — 96374 THER/PROPH/DIAG INJ IV PUSH: CPT

## 2022-12-23 PROCEDURE — U0004 COV-19 TEST NON-CDC HGH THRU: HCPCS | Performed by: EMERGENCY MEDICINE

## 2022-12-23 PROCEDURE — 96375 TX/PRO/DX INJ NEW DRUG ADDON: CPT

## 2022-12-23 PROCEDURE — 87804 INFLUENZA ASSAY W/OPTIC: CPT | Performed by: EMERGENCY MEDICINE

## 2022-12-23 PROCEDURE — 83605 ASSAY OF LACTIC ACID: CPT | Performed by: EMERGENCY MEDICINE

## 2022-12-23 PROCEDURE — 25010000002 ONDANSETRON PER 1 MG: Performed by: EMERGENCY MEDICINE

## 2022-12-23 RX ORDER — ONDANSETRON 2 MG/ML
4 INJECTION INTRAMUSCULAR; INTRAVENOUS ONCE
Status: COMPLETED | OUTPATIENT
Start: 2022-12-23 | End: 2022-12-23

## 2022-12-23 RX ORDER — FAMOTIDINE 10 MG/ML
20 INJECTION, SOLUTION INTRAVENOUS ONCE
Status: COMPLETED | OUTPATIENT
Start: 2022-12-23 | End: 2022-12-23

## 2022-12-23 RX ORDER — DICYCLOMINE HYDROCHLORIDE 10 MG/ML
20 INJECTION INTRAMUSCULAR ONCE
Status: COMPLETED | OUTPATIENT
Start: 2022-12-23 | End: 2022-12-23

## 2022-12-23 RX ORDER — SODIUM CHLORIDE 0.9 % (FLUSH) 0.9 %
10 SYRINGE (ML) INJECTION AS NEEDED
Status: DISCONTINUED | OUTPATIENT
Start: 2022-12-23 | End: 2022-12-23 | Stop reason: HOSPADM

## 2022-12-23 RX ADMIN — FAMOTIDINE 20 MG: 10 INJECTION INTRAVENOUS at 12:35

## 2022-12-23 RX ADMIN — SODIUM CHLORIDE 1000 ML: 9 INJECTION, SOLUTION INTRAVENOUS at 12:35

## 2022-12-23 RX ADMIN — ONDANSETRON 4 MG: 2 INJECTION INTRAMUSCULAR; INTRAVENOUS at 12:35

## 2022-12-23 RX ADMIN — IOPAMIDOL 100 ML: 755 INJECTION, SOLUTION INTRAVENOUS at 12:51

## 2022-12-23 RX ADMIN — DICYCLOMINE HYDROCHLORIDE 20 MG: 20 INJECTION, SOLUTION INTRAMUSCULAR at 12:35
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
## 2022-12-23 NOTE — DISCHARGE INSTRUCTIONS
Liquid diet only for the next 24 hours and then advance your diet very slowly as tolerated    Please push oral fluids    Please follow-up with your doctor Monday for serial reexamination of the abdomen.  Return to the emergency room immediately for intractable pain, intractable vomiting, fever, shaking chills, muscle aches, near passing out, passing out, jaundice or any new symptoms you are concerned with    Please take Zofran and Bentyl as previously prescribed by [facility] today

## 2023-01-13 PROCEDURE — 80053 COMPREHEN METABOLIC PANEL: CPT

## 2023-01-13 PROCEDURE — 83690 ASSAY OF LIPASE: CPT

## 2023-01-13 PROCEDURE — 85025 COMPLETE CBC W/AUTO DIFF WBC: CPT

## 2023-01-14 ENCOUNTER — TELEPHONE (OUTPATIENT)
Dept: URGENT CARE | Facility: CLINIC | Age: 33
End: 2023-01-14
Payer: OTHER GOVERNMENT

## 2023-01-14 NOTE — TELEPHONE ENCOUNTER
----- Message from PILLO Dumont sent at 1/14/2023  9:51 AM EST -----  Please notify patient of improved lab results from previous 3 weeks ago.  It is still recommended to follow up with GI for evaluation.

## 2023-01-15 ENCOUNTER — TELEPHONE (OUTPATIENT)
Dept: URGENT CARE | Facility: CLINIC | Age: 33
End: 2023-01-15
Payer: OTHER GOVERNMENT

## 2023-01-24 ENCOUNTER — OFFICE VISIT (OUTPATIENT)
Dept: FAMILY MEDICINE CLINIC | Facility: CLINIC | Age: 33
End: 2023-01-24
Payer: OTHER GOVERNMENT

## 2023-01-24 ENCOUNTER — HOSPITAL ENCOUNTER (OUTPATIENT)
Dept: GENERAL RADIOLOGY | Facility: HOSPITAL | Age: 33
Discharge: HOME | End: 2023-01-24
Admitting: FAMILY MEDICINE
Payer: OTHER GOVERNMENT

## 2023-01-24 VITALS
SYSTOLIC BLOOD PRESSURE: 143 MMHG | HEIGHT: 74 IN | OXYGEN SATURATION: 95 % | DIASTOLIC BLOOD PRESSURE: 88 MMHG | TEMPERATURE: 97.3 F | HEART RATE: 96 BPM | WEIGHT: 315 LBS | BODY MASS INDEX: 40.43 KG/M2

## 2023-01-24 DIAGNOSIS — R10.84 GENERALIZED ABDOMINAL PAIN: ICD-10-CM

## 2023-01-24 DIAGNOSIS — M25.552 HIP PAIN, LEFT: ICD-10-CM

## 2023-01-24 DIAGNOSIS — A08.4 VIRAL GASTROENTERITIS: ICD-10-CM

## 2023-01-24 DIAGNOSIS — R10.13 RECURRENT EPIGASTRIC ABDOMINAL PAIN: Primary | ICD-10-CM

## 2023-01-24 PROCEDURE — 73502 X-RAY EXAM HIP UNI 2-3 VIEWS: CPT

## 2023-01-24 PROCEDURE — 99214 OFFICE O/P EST MOD 30 MIN: CPT | Performed by: FAMILY MEDICINE

## 2023-01-24 RX ORDER — BACLOFEN 10 MG/1
10 TABLET ORAL 3 TIMES DAILY
COMMUNITY

## 2023-01-24 RX ORDER — DICYCLOMINE HYDROCHLORIDE 10 MG/1
10 CAPSULE ORAL
Qty: 120 CAPSULE | Refills: 1 | Status: SHIPPED | OUTPATIENT
Start: 2023-01-24 | End: 2023-03-07
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
## 2023-01-24 NOTE — ASSESSMENT & PLAN NOTE
He continues have these recurrent episodes of epigastric discomfort and nausea and vomiting.  He states when he was in the urgent care he did given some dicyclomine which did seem to help.  He has never been scoped.  We will set him up for at least an upper endoscopy.

## 2023-01-24 NOTE — ASSESSMENT & PLAN NOTE
He has persistent to left hip pain for several months.  He has reduced range of motion.  We will start with an x-ray of his left hip.

## 2023-02-03 ENCOUNTER — APPOINTMENT (OUTPATIENT)
Dept: CT IMAGING | Facility: HOSPITAL | Age: 33
End: 2023-02-03
Payer: OTHER GOVERNMENT

## 2023-02-03 ENCOUNTER — PATIENT MESSAGE (OUTPATIENT)
Dept: FAMILY MEDICINE CLINIC | Facility: CLINIC | Age: 33
End: 2023-02-03
Payer: OTHER GOVERNMENT

## 2023-02-03 ENCOUNTER — HOSPITAL ENCOUNTER (EMERGENCY)
Facility: HOSPITAL | Age: 33
Discharge: HOME | End: 2023-02-03
Attending: EMERGENCY MEDICINE | Admitting: EMERGENCY MEDICINE
Payer: OTHER GOVERNMENT

## 2023-02-03 VITALS
OXYGEN SATURATION: 94 % | RESPIRATION RATE: 18 BRPM | HEIGHT: 74 IN | BODY MASS INDEX: 40.43 KG/M2 | WEIGHT: 315 LBS | DIASTOLIC BLOOD PRESSURE: 77 MMHG | TEMPERATURE: 98.1 F | HEART RATE: 105 BPM | SYSTOLIC BLOOD PRESSURE: 112 MMHG

## 2023-02-03 DIAGNOSIS — R10.9 ABDOMINAL PAIN, UNSPECIFIED ABDOMINAL LOCATION: Primary | ICD-10-CM

## 2023-02-03 LAB
ALBUMIN SERPL-MCNC: 4.5 G/DL (ref 3.5–5.2)
ALBUMIN/GLOB SERPL: 1.3 G/DL
ALP SERPL-CCNC: 95 U/L (ref 39–117)
ALT SERPL W P-5'-P-CCNC: 71 U/L (ref 1–41)
ANION GAP SERPL CALCULATED.3IONS-SCNC: 12.6 MMOL/L (ref 5–15)
AST SERPL-CCNC: 35 U/L (ref 1–40)
BACTERIA UR QL AUTO: ABNORMAL /HPF
BASOPHILS # BLD AUTO: 0.02 10*3/MM3 (ref 0–0.2)
BASOPHILS NFR BLD AUTO: 0.2 % (ref 0–1.5)
BILIRUB SERPL-MCNC: 1.3 MG/DL (ref 0–1.2)
BILIRUB UR QL STRIP: NEGATIVE
BUN SERPL-MCNC: 22 MG/DL (ref 6–20)
BUN/CREAT SERPL: 25 (ref 7–25)
CALCIUM SPEC-SCNC: 9.2 MG/DL (ref 8.6–10.5)
CHLORIDE SERPL-SCNC: 103 MMOL/L (ref 98–107)
CLARITY UR: CLEAR
CO2 SERPL-SCNC: 21.4 MMOL/L (ref 22–29)
COLOR UR: ABNORMAL
CREAT SERPL-MCNC: 0.88 MG/DL (ref 0.76–1.27)
DEPRECATED RDW RBC AUTO: 35.2 FL (ref 37–54)
EGFRCR SERPLBLD CKD-EPI 2021: 118.6 ML/MIN/1.73
EOSINOPHIL # BLD AUTO: 0.03 10*3/MM3 (ref 0–0.4)
EOSINOPHIL NFR BLD AUTO: 0.2 % (ref 0.3–6.2)
ERYTHROCYTE [DISTWIDTH] IN BLOOD BY AUTOMATED COUNT: 11.8 % (ref 12.3–15.4)
GLOBULIN UR ELPH-MCNC: 3.4 GM/DL
GLUCOSE SERPL-MCNC: 123 MG/DL (ref 65–99)
GLUCOSE UR STRIP-MCNC: NEGATIVE MG/DL
HCT VFR BLD AUTO: 47.6 % (ref 37.5–51)
HEMOCCULT STL QL IA: POSITIVE
HGB BLD-MCNC: 16.6 G/DL (ref 13–17.7)
HGB UR QL STRIP.AUTO: NEGATIVE
HOLD SPECIMEN: NORMAL
HOLD SPECIMEN: NORMAL
HYALINE CASTS UR QL AUTO: ABNORMAL /LPF
IMM GRANULOCYTES # BLD AUTO: 0.09 10*3/MM3 (ref 0–0.05)
IMM GRANULOCYTES NFR BLD AUTO: 0.7 % (ref 0–0.5)
KETONES UR QL STRIP: ABNORMAL
LEUKOCYTE ESTERASE UR QL STRIP.AUTO: ABNORMAL
LIPASE SERPL-CCNC: 20 U/L (ref 13–60)
LYMPHOCYTES # BLD AUTO: 0.8 10*3/MM3 (ref 0.7–3.1)
LYMPHOCYTES NFR BLD AUTO: 6.4 % (ref 19.6–45.3)
MCH RBC QN AUTO: 29 PG (ref 26.6–33)
MCHC RBC AUTO-ENTMCNC: 34.9 G/DL (ref 31.5–35.7)
MCV RBC AUTO: 83.1 FL (ref 79–97)
MONOCYTES # BLD AUTO: 0.54 10*3/MM3 (ref 0.1–0.9)
MONOCYTES NFR BLD AUTO: 4.3 % (ref 5–12)
NEUTROPHILS NFR BLD AUTO: 11.07 10*3/MM3 (ref 1.7–7)
NEUTROPHILS NFR BLD AUTO: 88.2 % (ref 42.7–76)
NITRITE UR QL STRIP: NEGATIVE
NRBC BLD AUTO-RTO: 0 /100 WBC (ref 0–0.2)
PH UR STRIP.AUTO: 5.5 [PH] (ref 5–8)
PLATELET # BLD AUTO: 317 10*3/MM3 (ref 140–450)
PMV BLD AUTO: 11.2 FL (ref 6–12)
POTASSIUM SERPL-SCNC: 4.5 MMOL/L (ref 3.5–5.2)
PROT SERPL-MCNC: 7.9 G/DL (ref 6–8.5)
PROT UR QL STRIP: ABNORMAL
RBC # BLD AUTO: 5.73 10*6/MM3 (ref 4.14–5.8)
RBC # UR STRIP: ABNORMAL /HPF
REF LAB TEST METHOD: ABNORMAL
SODIUM SERPL-SCNC: 137 MMOL/L (ref 136–145)
SP GR UR STRIP: >1.03 (ref 1–1.03)
SQUAMOUS #/AREA URNS HPF: ABNORMAL /HPF
UROBILINOGEN UR QL STRIP: ABNORMAL
WBC # UR STRIP: ABNORMAL /HPF
WBC NRBC COR # BLD: 12.55 10*3/MM3 (ref 3.4–10.8)
WHOLE BLOOD HOLD COAG: NORMAL
WHOLE BLOOD HOLD SPECIMEN: NORMAL

## 2023-02-03 PROCEDURE — 25010000002 KETOROLAC TROMETHAMINE PER 15 MG: Performed by: EMERGENCY MEDICINE

## 2023-02-03 PROCEDURE — 80053 COMPREHEN METABOLIC PANEL: CPT

## 2023-02-03 PROCEDURE — 85025 COMPLETE CBC W/AUTO DIFF WBC: CPT

## 2023-02-03 PROCEDURE — 96374 THER/PROPH/DIAG INJ IV PUSH: CPT

## 2023-02-03 PROCEDURE — 81001 URINALYSIS AUTO W/SCOPE: CPT

## 2023-02-03 PROCEDURE — 96375 TX/PRO/DX INJ NEW DRUG ADDON: CPT

## 2023-02-03 PROCEDURE — 25010000002 HYDROMORPHONE 1 MG/ML SOLUTION: Performed by: NURSE PRACTITIONER

## 2023-02-03 PROCEDURE — 99283 EMERGENCY DEPT VISIT LOW MDM: CPT

## 2023-02-03 PROCEDURE — 74177 CT ABD & PELVIS W/CONTRAST: CPT

## 2023-02-03 PROCEDURE — 36415 COLL VENOUS BLD VENIPUNCTURE: CPT

## 2023-02-03 PROCEDURE — 82274 ASSAY TEST FOR BLOOD FECAL: CPT | Performed by: NURSE PRACTITIONER

## 2023-02-03 PROCEDURE — 0 IOPAMIDOL PER 1 ML: Performed by: NURSE PRACTITIONER

## 2023-02-03 PROCEDURE — 25010000002 ONDANSETRON PER 1 MG: Performed by: NURSE PRACTITIONER

## 2023-02-03 PROCEDURE — 93005 ELECTROCARDIOGRAM TRACING: CPT

## 2023-02-03 PROCEDURE — 83690 ASSAY OF LIPASE: CPT

## 2023-02-03 PROCEDURE — 93005 ELECTROCARDIOGRAM TRACING: CPT | Performed by: EMERGENCY MEDICINE

## 2023-02-03 RX ORDER — SODIUM CHLORIDE 0.9 % (FLUSH) 0.9 %
10 SYRINGE (ML) INJECTION AS NEEDED
Status: DISCONTINUED | OUTPATIENT
Start: 2023-02-03 | End: 2023-02-03 | Stop reason: HOSPADM

## 2023-02-03 RX ORDER — ONDANSETRON 4 MG/1
4 TABLET, ORALLY DISINTEGRATING ORAL EVERY 8 HOURS PRN
Qty: 15 TABLET | Refills: 0 | Status: SHIPPED | OUTPATIENT
Start: 2023-02-03

## 2023-02-03 RX ORDER — CIPROFLOXACIN 500 MG/1
500 TABLET, FILM COATED ORAL EVERY 12 HOURS
Qty: 20 TABLET | Refills: 0 | Status: SHIPPED | OUTPATIENT
Start: 2023-02-03 | End: 2023-03-28

## 2023-02-03 RX ORDER — KETOROLAC TROMETHAMINE 30 MG/ML
30 INJECTION, SOLUTION INTRAMUSCULAR; INTRAVENOUS ONCE
Status: COMPLETED | OUTPATIENT
Start: 2023-02-03 | End: 2023-02-03

## 2023-02-03 RX ORDER — METRONIDAZOLE 500 MG/1
500 TABLET ORAL 3 TIMES DAILY
Qty: 21 TABLET | Refills: 0 | Status: SHIPPED | OUTPATIENT
Start: 2023-02-03 | End: 2023-03-01

## 2023-02-03 RX ORDER — PANTOPRAZOLE SODIUM 40 MG/10ML
40 INJECTION, POWDER, LYOPHILIZED, FOR SOLUTION INTRAVENOUS
Status: DISCONTINUED | OUTPATIENT
Start: 2023-02-04 | End: 2023-02-03 | Stop reason: HOSPADM

## 2023-02-03 RX ORDER — ONDANSETRON 2 MG/ML
4 INJECTION INTRAMUSCULAR; INTRAVENOUS ONCE
Status: COMPLETED | OUTPATIENT
Start: 2023-02-03 | End: 2023-02-03

## 2023-02-03 RX ORDER — FAMOTIDINE 10 MG/ML
20 INJECTION, SOLUTION INTRAVENOUS ONCE
Status: COMPLETED | OUTPATIENT
Start: 2023-02-03 | End: 2023-02-03

## 2023-02-03 RX ORDER — TRAMADOL HYDROCHLORIDE 50 MG/1
50 TABLET ORAL EVERY 6 HOURS PRN
Qty: 8 TABLET | Refills: 0 | Status: SHIPPED | OUTPATIENT
Start: 2023-02-03 | End: 2023-02-14 | Stop reason: SDUPTHER

## 2023-02-03 RX ADMIN — HYDROMORPHONE HYDROCHLORIDE 0.5 MG: 1 INJECTION, SOLUTION INTRAMUSCULAR; INTRAVENOUS; SUBCUTANEOUS at 12:26

## 2023-02-03 RX ADMIN — ONDANSETRON 4 MG: 2 INJECTION INTRAMUSCULAR; INTRAVENOUS at 12:27

## 2023-02-03 RX ADMIN — KETOROLAC TROMETHAMINE 30 MG: 30 INJECTION, SOLUTION INTRAMUSCULAR; INTRAVENOUS at 13:55

## 2023-02-03 RX ADMIN — IOPAMIDOL 100 ML: 755 INJECTION, SOLUTION INTRAVENOUS at 12:14

## 2023-02-03 RX ADMIN — FAMOTIDINE 20 MG: 10 INJECTION INTRAVENOUS at 12:26

## 2023-02-03 RX ADMIN — SODIUM CHLORIDE 1000 ML: 9 INJECTION, SOLUTION INTRAVENOUS at 12:27

## 2023-02-03 NOTE — DISCHARGE INSTRUCTIONS
Your CT scan did show some lymph nodes which may be reactive and are unchanged since your December CT scan.  However with bleeding it is imperative that you follow-up for endoscopy and colonoscopy as sometimes this may be malignant.  It is important that you follow-up with a GI physician and your primary care doctor without fail in the next week.
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
## 2023-02-08 ENCOUNTER — PREP FOR SURGERY (OUTPATIENT)
Dept: OTHER | Facility: HOSPITAL | Age: 33
End: 2023-02-08
Payer: OTHER GOVERNMENT

## 2023-02-08 ENCOUNTER — OFFICE VISIT (OUTPATIENT)
Dept: GASTROENTEROLOGY | Facility: CLINIC | Age: 33
End: 2023-02-08
Payer: OTHER GOVERNMENT

## 2023-02-08 VITALS
HEIGHT: 74 IN | BODY MASS INDEX: 40.43 KG/M2 | DIASTOLIC BLOOD PRESSURE: 93 MMHG | HEART RATE: 97 BPM | OXYGEN SATURATION: 100 % | WEIGHT: 315 LBS | SYSTOLIC BLOOD PRESSURE: 151 MMHG

## 2023-02-08 DIAGNOSIS — R19.7 DIARRHEA, UNSPECIFIED TYPE: ICD-10-CM

## 2023-02-08 DIAGNOSIS — Z79.1 NSAID LONG-TERM USE: ICD-10-CM

## 2023-02-08 DIAGNOSIS — K21.9 GASTROESOPHAGEAL REFLUX DISEASE WITHOUT ESOPHAGITIS: ICD-10-CM

## 2023-02-08 DIAGNOSIS — K92.1 BLOOD IN STOOL: ICD-10-CM

## 2023-02-08 DIAGNOSIS — R10.9 ABDOMINAL PAIN, UNSPECIFIED ABDOMINAL LOCATION: Primary | ICD-10-CM

## 2023-02-08 DIAGNOSIS — R59.0 MESENTERIC LYMPHADENOPATHY: ICD-10-CM

## 2023-02-08 DIAGNOSIS — R11.2 NAUSEA AND VOMITING, UNSPECIFIED VOMITING TYPE: ICD-10-CM

## 2023-02-08 PROCEDURE — 99214 OFFICE O/P EST MOD 30 MIN: CPT

## 2023-02-08 RX ORDER — SODIUM PICOSULFATE, MAGNESIUM OXIDE, AND ANHYDROUS CITRIC ACID 10; 3.5; 12 MG/160ML; G/160ML; G/160ML
1 LIQUID ORAL TAKE AS DIRECTED
Qty: 320 ML | Refills: 0 | Status: SHIPPED | OUTPATIENT
Start: 2023-02-08
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
## 2023-02-09 ENCOUNTER — PATIENT ROUNDING (BHMG ONLY) (OUTPATIENT)
Dept: GASTROENTEROLOGY | Facility: CLINIC | Age: 33
End: 2023-02-09
Payer: OTHER GOVERNMENT

## 2023-02-09 NOTE — PROGRESS NOTES
2/9/2023      Hello, may I speak with Thai Ball     My name is Anthony. I am calling from [facility] [place]. I show that you had a recent visit with PILLO Bonilla.    Before we get started may I verify your date of birth? 1990    I am calling to officially welcome you to our practice and ask about your recent visit. Is this a good time to talk? No. I left patient a voicemail.     Tell me about your visit with us. What things went well?         We're always looking for ways to make our patients' experiences even better. Do you have recommendations on ways we may improve?    Overall were you satisfied with your first visit to our practice?    I appreciate you taking the time to speak with me today. Is there anything else I can do for you?    I am glad to hear that you had a very good visit and I appreciate you taking the time to provide feedback on this call. We would greatly appreciate you filling out a survey if you receive one in the mail, email or text. This is a great opportunity to provide any additional feedback that you may think of after this call as well.       Thank you, and have a great day.

## 2023-02-14 ENCOUNTER — OFFICE VISIT (OUTPATIENT)
Dept: FAMILY MEDICINE CLINIC | Facility: CLINIC | Age: 33
End: 2023-02-14
Payer: OTHER GOVERNMENT

## 2023-02-14 VITALS
SYSTOLIC BLOOD PRESSURE: 124 MMHG | HEART RATE: 87 BPM | BODY MASS INDEX: 40.43 KG/M2 | DIASTOLIC BLOOD PRESSURE: 72 MMHG | WEIGHT: 315 LBS | TEMPERATURE: 97.7 F | OXYGEN SATURATION: 99 % | HEIGHT: 74 IN

## 2023-02-14 DIAGNOSIS — Z79.899 MEDICATION MANAGEMENT: Primary | ICD-10-CM

## 2023-02-14 DIAGNOSIS — R10.84 GENERALIZED ABDOMINAL PAIN: ICD-10-CM

## 2023-02-14 DIAGNOSIS — R10.13 RECURRENT EPIGASTRIC ABDOMINAL PAIN: ICD-10-CM

## 2023-02-14 PROCEDURE — 80305 DRUG TEST PRSMV DIR OPT OBS: CPT | Performed by: NURSE PRACTITIONER

## 2023-02-14 PROCEDURE — 99213 OFFICE O/P EST LOW 20 MIN: CPT | Performed by: NURSE PRACTITIONER

## 2023-02-14 RX ORDER — TRAMADOL HYDROCHLORIDE 50 MG/1
50 TABLET ORAL EVERY 8 HOURS PRN
Qty: 60 TABLET | Refills: 0 | Status: SHIPPED | OUTPATIENT
Start: 2023-02-14 | End: 2023-03-08 | Stop reason: SDUPTHER

## 2023-02-14 RX ORDER — ESOMEPRAZOLE MAGNESIUM 40 MG/1
40 CAPSULE, DELAYED RELEASE ORAL
Qty: 90 CAPSULE | Refills: 1 | Status: SHIPPED | OUTPATIENT
Start: 2023-02-14 | End: 2023-02-14 | Stop reason: ALTCHOICE

## 2023-02-14 RX ORDER — FAMOTIDINE 20 MG/1
20 TABLET, FILM COATED ORAL 2 TIMES DAILY
Qty: 90 TABLET | Refills: 1 | Status: SHIPPED | OUTPATIENT
Start: 2023-02-14 | End: 2023-03-24

## 2023-02-14 RX ORDER — OMEPRAZOLE 40 MG/1
40 CAPSULE, DELAYED RELEASE ORAL DAILY
Qty: 90 CAPSULE | Refills: 1 | Status: SHIPPED | OUTPATIENT
Start: 2023-02-14

## 2023-02-14 NOTE — PROGRESS NOTES
Chief Complaint  Abdominal Pain and med refills    Subjective          Thai Ball is a 32 y.o. male who presents to [facility]    History of Present Illness  Having reoccurring stomach pain, has been to ER 4 times since .  Has seen GI and scheduled for colonoscopy and EGD in March.  Complains of diarrhea.  ER gave him tramadol and that's the only thing that has helped.  Pain is upper stomach, from belly button up.    PHQ-2 Total Score:     PHQ-9 Total Score:          Review of Systems       Medical History: has a past medical history of Chronic pain, Depression, Fibromyalgia, Kidney stone, Migraine, and Vitamin D deficiency.     Surgical History: has a past surgical history that includes Neck surgery.     Family History: family history is not on file.     Social History: reports that he has never smoked. He has never been exposed to tobacco smoke. His smokeless tobacco use includes chew. He reports that he does not drink alcohol and does not use drugs.    Allergies: Compazine [prochlorperazine]      Health Maintenance Due   Topic Date Due   • COVID-19 Vaccine (3 - Booster) 08/10/2021   • HEPATITIS C SCREENING  Never done   • ANNUAL PHYSICAL  Never done        Current Outpatient Medications:   •  albuterol sulfate  (90 Base) MCG/ACT inhaler, Inhale 2 puffs Every 6 (Six) Hours As Needed for Wheezing or Shortness of Air., Disp: 18 g, Rfl: 0  •  baclofen (LIORESAL) 10 MG tablet, Take 10 mg by mouth 3 (Three) Times a Day., Disp: , Rfl:   •  bismuth subsalicylate (PEPTO BISMOL) 262 MG/15ML suspension, Take 15 mL by mouth Every 6 (Six) Hours As Needed for Indigestion., Disp: , Rfl:   •  cetirizine (zyrTEC) 10 MG tablet, Take 10 mg by mouth Daily., Disp: , Rfl:   •  diclofenac (VOLTAREN) 50 MG EC tablet, Take 1 tablet by mouth 3 (Three) Times a Day., Disp: 20 tablet, Rfl: 0  •  dicyclomine (BENTYL) 10 MG capsule, Take 1 capsule by mouth 4 (Four) Times a Day Before Meals & at  Bedtime for 30 days., Disp: 120 capsule, Rfl: 1  •  DULoxetine (CYMBALTA) 60 MG capsule, duloxetine 60 mg capsule,delayed release, Disp: , Rfl:   •  gabapentin (NEURONTIN) 100 MG capsule, Take 100 mg by mouth 3 (Three) Times a Day., Disp: , Rfl:   •  galcanezumab-gnlm (Emgality) 120 MG/ML auto-injector pen, Emgality Pen 120 mg/mL subcutaneous pen injector  ADMINISTER 1 ML UNDER THE SKIN EVERY MONTH AS DIRECTED (90 day supply), Disp: , Rfl:   •  loratadine (CLARITIN) 10 MG tablet, Daily., Disp: , Rfl:   •  metroNIDAZOLE (FLAGYL) 500 MG tablet, Take 1 tablet by mouth 3 (Three) Times a Day., Disp: 21 tablet, Rfl: 0  •  ondansetron ODT (ZOFRAN-ODT) 4 MG disintegrating tablet, Place 1 tablet on the tongue Every 8 (Eight) Hours As Needed for Nausea or Vomiting., Disp: 20 tablet, Rfl: 0  •  ondansetron ODT (ZOFRAN-ODT) 4 MG disintegrating tablet, Place 1 tablet on the tongue Every 8 (Eight) Hours As Needed for Nausea or Vomiting., Disp: 15 tablet, Rfl: 0  •  Pain Reliever Plus 250-250-65 MG per tablet, , Disp: , Rfl:   •  rizatriptan MLT (MAXALT-MLT) 10 MG disintegrating tablet, 10 mg 1 (One) Time As Needed., Disp: , Rfl:   •  Sod Picosulfate-Mag Ox-Cit Acd (Clenpiq) 10-3.5-12 MG-GM -GM/160ML solution, Take 160 mL by mouth Take As Directed., Disp: 320 mL, Rfl: 0  •  tiZANidine (ZANAFLEX) 2 MG tablet, Take 2 mg by mouth Every 8 (Eight) Hours As Needed., Disp: , Rfl:   •  traMADol (ULTRAM) 50 MG tablet, Take 1 tablet by mouth Every 8 (Eight) Hours As Needed for Moderate Pain., Disp: 60 tablet, Rfl: 0  •  traZODone (DESYREL) 100 MG tablet, trazodone 100 mg tablet, Disp: , Rfl:   •  ciprofloxacin (CIPRO) 500 MG tablet, Take 1 tablet by mouth Every 12 (Twelve) Hours., Disp: 20 tablet, Rfl: 0  •  famotidine (Pepcid) 20 MG tablet, Take 1 tablet by mouth 2 (Two) Times a Day., Disp: 90 tablet, Rfl: 1  •  omeprazole (priLOSEC) 40 MG capsule, Take 1 capsule by mouth Daily., Disp: 90 capsule, Rfl: 1      Immunization History  "  Administered Date(s) Administered   • COVID-19 (UNSPECIFIED) 05/25/2021, 06/15/2021   • FluLaval/Fluzone >6mos 01/13/2023   • TD Preservative Free 01/13/2023         Objective       Vitals:    02/14/23 0703   BP: 124/72   BP Location: Left arm   Patient Position: Sitting   Cuff Size: Large Adult   Pulse: 87   Temp: 97.7 °F (36.5 °C)   TempSrc: Temporal   SpO2: 99%   Weight: (!) 153 kg (337 lb 9.6 oz)   Height: 188 cm (74\")   PainSc:   4   PainLoc: Abdomen      Body mass index is 43.35 kg/m².   Wt Readings from Last 3 Encounters:   02/14/23 (!) 153 kg (337 lb 9.6 oz)   02/08/23 (!) 153 kg (338 lb 3.2 oz)   02/03/23 (!) 152 kg (334 lb 10.5 oz)      BP Readings from Last 3 Encounters:   02/14/23 124/72   02/08/23 151/93   02/03/23 112/77        Physical Exam  Vitals reviewed.   Constitutional:       Appearance: Normal appearance.   HENT:      Head: Normocephalic and atraumatic.   Eyes:      Conjunctiva/sclera: Conjunctivae normal.      Pupils: Pupils are equal, round, and reactive to light.   Cardiovascular:      Rate and Rhythm: Normal rate and regular rhythm.      Pulses: Normal pulses.      Heart sounds: Normal heart sounds.   Pulmonary:      Effort: Pulmonary effort is normal.      Breath sounds: Normal breath sounds.   Abdominal:      Tenderness: There is abdominal tenderness.   Skin:     General: Skin is warm and dry.   Neurological:      Mental Status: He is alert and oriented to person, place, and time.   Psychiatric:         Mood and Affect: Mood normal.         Behavior: Behavior normal.         Thought Content: Thought content normal.         Judgment: Judgment normal.         Result Review :       Common labs    Common Labs 12/23/22 12/23/22 1/13/23 1/13/23 2/3/23 2/3/23    1228 1228 1514 1514 1014 1014   Glucose  110 (A)  95  123 (A)   BUN  19 19 22 (A)   Creatinine  0.98  0.83  0.88   Sodium  137  139  137   Potassium  4.4  4.5  4.5   Chloride  103  104  103   Calcium  9.2  9.2  9.2   Albumin  4.80  " 4.5  4.5   Total Bilirubin  0.9  0.8  1.3 (A)   Alkaline Phosphatase  107  103  95   AST (SGOT)  41 (A)  35  35   ALT (SGPT)  70 (A)  66 (A)  71 (A)   WBC 16.50 (A)  11.60 (A)  12.55 (A)    Hemoglobin 17.7  16.1  16.6    Hematocrit 49.8  46.7  47.6    Platelets 296  298  317    (A) Abnormal value                             Assessment and Plan        Diagnoses and all orders for this visit:    1. Medication management (Primary)  -     POC Urine Drug Screen [facility]    2. Recurrent epigastric abdominal pain  -     traMADol (ULTRAM) 50 MG tablet; Take 1 tablet by mouth Every 8 (Eight) Hours As Needed for Moderate Pain.  Dispense: 60 tablet; Refill: 0  -     Discontinue: esomeprazole (nexIUM) 40 MG capsule; Take 1 capsule by mouth Every Morning Before Breakfast.  Dispense: 90 capsule; Refill: 1  -     famotidine (Pepcid) 20 MG tablet; Take 1 tablet by mouth 2 (Two) Times a Day.  Dispense: 90 tablet; Refill: 1  -     omeprazole (priLOSEC) 40 MG capsule; Take 1 capsule by mouth Daily.  Dispense: 90 capsule; Refill: 1    3. Generalized abdominal pain  -     traMADol (ULTRAM) 50 MG tablet; Take 1 tablet by mouth Every 8 (Eight) Hours As Needed for Moderate Pain.  Dispense: 60 tablet; Refill: 0          Follow Up   Obtained a written consent for GUI query. Discussed the risks and benefits of the use of controlled substances with the patient, including the risks of tolerance and drug dependence.  The patient has been counseled on the need to have an exit strategy, including potentially discontinuing the use of controlled substances.  GUI has or will be reviewed as soon as it becomes available.  Return if symptoms worsen or fail to improve, for Next scheduled follow up.    Patient was given instructions and counseling regarding his condition or for health maintenance advice. Please see specific information pulled into the AVS if appropriate.     PILLO Haines

## 2023-02-15 LAB — QT INTERVAL: 299 MS

## 2023-03-01 ENCOUNTER — TELEPHONE (OUTPATIENT)
Dept: GASTROENTEROLOGY | Facility: CLINIC | Age: 33
End: 2023-03-01
Payer: OTHER GOVERNMENT

## 2023-03-01 LAB
027 TOXIN: ABNORMAL
C DIFF TOX GENS STL QL NAA+PROBE: POSITIVE
HEMOCCULT STL QL IA: NEGATIVE
LACTOFERRIN STL QL LA: NEGATIVE

## 2023-03-01 PROCEDURE — 87506 IADNA-DNA/RNA PROBE TQ 6-11: CPT

## 2023-03-01 PROCEDURE — 87493 C DIFF AMPLIFIED PROBE: CPT

## 2023-03-01 PROCEDURE — 82274 ASSAY TEST FOR BLOOD FECAL: CPT

## 2023-03-01 PROCEDURE — 83630 LACTOFERRIN FECAL (QUAL): CPT

## 2023-03-01 NOTE — TELEPHONE ENCOUNTER
I spoke with Mr Ball, confirmed his scheduled egd/colon on 03.15.23, estimated arrival time of 12:30pm.  Reminded of liquid diet the day prior. Reminded of bowel prep and instructions. Stated a hospital nurse will call to go over Rx's and confirm time.. voiced understanding. abdi

## 2023-03-02 LAB
C COLI+JEJ+UPSA DNA STL QL NAA+NON-PROBE: NOT DETECTED
CRYPTOSP DNA STL QL NAA+NON-PROBE: NOT DETECTED
E HISTOLYT DNA STL QL NAA+NON-PROBE: NOT DETECTED
EC STX1+STX2 GENES STL QL NAA+NON-PROBE: NOT DETECTED
G LAMBLIA DNA STL QL NAA+NON-PROBE: NOT DETECTED
S ENT+BONG DNA STL QL NAA+NON-PROBE: NOT DETECTED
SHIGELLA SP+EIEC IPAH ST NAA+NON-PROBE: NOT DETECTED

## 2023-03-06 DIAGNOSIS — A08.4 VIRAL GASTROENTERITIS: ICD-10-CM

## 2023-03-06 DIAGNOSIS — R10.84 GENERALIZED ABDOMINAL PAIN: ICD-10-CM

## 2023-03-07 ENCOUNTER — TELEPHONE (OUTPATIENT)
Dept: FAMILY MEDICINE CLINIC | Facility: CLINIC | Age: 33
End: 2023-03-07
Payer: OTHER GOVERNMENT

## 2023-03-07 DIAGNOSIS — R10.84 GENERALIZED ABDOMINAL PAIN: ICD-10-CM

## 2023-03-07 DIAGNOSIS — R10.13 RECURRENT EPIGASTRIC ABDOMINAL PAIN: ICD-10-CM

## 2023-03-07 RX ORDER — DICYCLOMINE HYDROCHLORIDE 10 MG/1
10 CAPSULE ORAL
Qty: 120 CAPSULE | Refills: 1 | Status: SHIPPED | OUTPATIENT
Start: 2023-03-07 | End: 2023-04-07

## 2023-03-07 RX ORDER — TRAMADOL HYDROCHLORIDE 50 MG/1
50 TABLET ORAL EVERY 8 HOURS PRN
Qty: 60 TABLET | Refills: 0 | Status: CANCELLED | OUTPATIENT
Start: 2023-03-07

## 2023-03-07 NOTE — TELEPHONE ENCOUNTER
Caller: Thai Ball    Relationship: Self    Best call back number: [phone]    Requested Prescriptions:   Requested Prescriptions     Pending Prescriptions Disp Refills   • traMADol (ULTRAM) 50 MG tablet 60 tablet 0     Sig: Take 1 tablet by mouth Every 8 (Eight) Hours As Needed for Moderate Pain.        Pharmacy where request should be sent: [facility]/PHARMACY #[id number] - [place], [place] - [address] [facility] - [phone]  - [phone] FX     Additional details provided by patient: THE PATIENT'S COLONOSCOPY HAS BEEN PUSHED BACK BY 8 WEEKS UNTIL HE HAS RECOVERED FROM C. DIFF. AARON LERMA PRESCRIBED TRAMADOL TO HELP WITH THE STOMACH PAIN. HE WOULD LIKE A REFILL OF THIS MEDICATION UNTIL HIS COLONOSCOPY     Does the patient have less than a 3 day supply:  [] Yes  [x] No    Douglas Pitts Rep   03/07/23 11:44 EST

## 2023-03-07 NOTE — TELEPHONE ENCOUNTER
Caller: Thai Ball    Relationship: Self    Best call back number: [phone]    Who is your current provider: DR. COHN    Who would you like your new provider to be: AARON LERMA    What are your reasons for transferring care: PREFERS TO SEE AARON    Additional notes: THE PATIENT WOULD LIKE A CALL BACK TO DISCUSS CHANGING PCP

## 2023-03-08 DIAGNOSIS — R10.84 GENERALIZED ABDOMINAL PAIN: ICD-10-CM

## 2023-03-08 DIAGNOSIS — R10.13 RECURRENT EPIGASTRIC ABDOMINAL PAIN: ICD-10-CM

## 2023-03-08 RX ORDER — TRAMADOL HYDROCHLORIDE 50 MG/1
50 TABLET ORAL EVERY 8 HOURS PRN
Qty: 20 TABLET | Refills: 0 | Status: SHIPPED | OUTPATIENT
Start: 2023-03-08 | End: 2023-03-28 | Stop reason: SDUPTHER

## 2023-03-24 DIAGNOSIS — R10.13 RECURRENT EPIGASTRIC ABDOMINAL PAIN: ICD-10-CM

## 2023-03-24 RX ORDER — FAMOTIDINE 20 MG/1
TABLET, FILM COATED ORAL
Qty: 90 TABLET | Refills: 1 | Status: SHIPPED | OUTPATIENT
Start: 2023-03-24

## 2023-03-28 ENCOUNTER — OFFICE VISIT (OUTPATIENT)
Dept: FAMILY MEDICINE CLINIC | Facility: CLINIC | Age: 33
End: 2023-03-28
Payer: OTHER GOVERNMENT

## 2023-03-28 VITALS
OXYGEN SATURATION: 97 % | HEART RATE: 81 BPM | WEIGHT: 315 LBS | DIASTOLIC BLOOD PRESSURE: 78 MMHG | SYSTOLIC BLOOD PRESSURE: 114 MMHG | BODY MASS INDEX: 42.5 KG/M2

## 2023-03-28 DIAGNOSIS — R10.13 RECURRENT EPIGASTRIC ABDOMINAL PAIN: Primary | ICD-10-CM

## 2023-03-28 DIAGNOSIS — R10.84 GENERALIZED ABDOMINAL PAIN: ICD-10-CM

## 2023-03-28 DIAGNOSIS — A09 DIARRHEA OF INFECTIOUS ORIGIN: ICD-10-CM

## 2023-03-28 DIAGNOSIS — Z79.899 MEDICATION MANAGEMENT: ICD-10-CM

## 2023-03-28 DIAGNOSIS — Z11.59 NEED FOR HEPATITIS C SCREENING TEST: ICD-10-CM

## 2023-03-28 RX ORDER — TRAMADOL HYDROCHLORIDE 50 MG/1
50 TABLET ORAL EVERY 8 HOURS PRN
Qty: 60 TABLET | Refills: 0 | Status: SHIPPED | OUTPATIENT
Start: 2023-03-28

## 2023-03-28 NOTE — PROGRESS NOTES
Chief Complaint  Establish Care    Subjective          Thai Ball is a 32 y.o. male who presents to [facility]    History of Present Illness    Complains of stomach pain, was treated by Gastro for C-diff. Diarrhea Saturday, watery and bright yellow.  Still have epigastric pain and left upper and lower abdominal pain.  Scheduled for colonoscopy 5/25/23    PHQ-2 Total Score:     PHQ-9 Total Score:          Review of Systems       Medical History: has a past medical history of Chronic pain, Depression, Fibromyalgia, Kidney stone, Migraine, and Vitamin D deficiency.     Surgical History: has a past surgical history that includes Neck surgery.     Family History: family history is not on file.     Social History: reports that he has never smoked. He has never been exposed to tobacco smoke. His smokeless tobacco use includes chew. He reports that he does not drink alcohol and does not use drugs.    Allergies: Compazine [prochlorperazine]      Health Maintenance Due   Topic Date Due   • COVID-19 Vaccine (3 - Booster) 08/10/2021   • HEPATITIS C SCREENING  Never done   • ANNUAL PHYSICAL  Never done            Current Outpatient Medications:   •  albuterol sulfate  (90 Base) MCG/ACT inhaler, Inhale 2 puffs Every 6 (Six) Hours As Needed for Wheezing or Shortness of Air., Disp: 18 g, Rfl: 0  •  baclofen (LIORESAL) 10 MG tablet, Take 1 tablet by mouth 3 (Three) Times a Day., Disp: , Rfl:   •  cetirizine (zyrTEC) 10 MG tablet, Take 1 tablet by mouth Daily., Disp: , Rfl:   •  diclofenac (VOLTAREN) 50 MG EC tablet, Take 1 tablet by mouth 3 (Three) Times a Day., Disp: 20 tablet, Rfl: 0  •  dicyclomine (BENTYL) 10 MG capsule, TAKE 1 CAPSULE BY MOUTH 4 (FOUR) TIMES A DAY BEFORE MEALS & AT BEDTIME FOR 30 DAYS., Disp: 120 capsule, Rfl: 1  •  DULoxetine (CYMBALTA) 60 MG capsule, duloxetine 60 mg capsule,delayed release, Disp: , Rfl:   •  famotidine (PEPCID) 20 MG tablet, TAKE 1 TABLET BY  MOUTH TWICE A DAY, Disp: 90 tablet, Rfl: 1  •  gabapentin (NEURONTIN) 100 MG capsule, Take 1 capsule by mouth 3 (Three) Times a Day., Disp: , Rfl:   •  galcanezumab-gnlm (Emgality) 120 MG/ML auto-injector pen, Emgality Pen 120 mg/mL subcutaneous pen injector  ADMINISTER 1 ML UNDER THE SKIN EVERY MONTH AS DIRECTED (90 day supply), Disp: , Rfl:   •  loratadine (CLARITIN) 10 MG tablet, Daily., Disp: , Rfl:   •  omeprazole (priLOSEC) 40 MG capsule, Take 1 capsule by mouth Daily., Disp: 90 capsule, Rfl: 1  •  ondansetron ODT (ZOFRAN-ODT) 4 MG disintegrating tablet, Place 1 tablet on the tongue Every 8 (Eight) Hours As Needed for Nausea or Vomiting., Disp: 20 tablet, Rfl: 0  •  ondansetron ODT (ZOFRAN-ODT) 4 MG disintegrating tablet, Place 1 tablet on the tongue Every 8 (Eight) Hours As Needed for Nausea or Vomiting., Disp: 15 tablet, Rfl: 0  •  Pain Reliever Plus 250-250-65 MG per tablet, , Disp: , Rfl:   •  rizatriptan MLT (MAXALT-MLT) 10 MG disintegrating tablet, 1 tablet 1 (One) Time As Needed., Disp: , Rfl:   •  Sod Picosulfate-Mag Ox-Cit Acd (Clenpiq) 10-3.5-12 MG-GM -GM/160ML solution, Take 160 mL by mouth Take As Directed., Disp: 320 mL, Rfl: 0  •  tiZANidine (ZANAFLEX) 2 MG tablet, Take 1 tablet by mouth Every 8 (Eight) Hours As Needed., Disp: , Rfl:   •  traMADol (ULTRAM) 50 MG tablet, Take 1 tablet by mouth Every 8 (Eight) Hours As Needed for Severe Pain., Disp: 60 tablet, Rfl: 0  •  traZODone (DESYREL) 100 MG tablet, trazodone 100 mg tablet, Disp: , Rfl:       Immunization History   Administered Date(s) Administered   • COVID-19 (UNSPECIFIED) 05/25/2021, 06/15/2021   • FluLaval/Fluzone >6mos 01/13/2023   • TD Preservative Free 01/13/2023         Objective       Vitals:    03/28/23 1031   BP: 114/78   Pulse: 81   SpO2: 97%   Weight: (!) 150 kg (331 lb)      Body mass index is 42.5 kg/m².   Wt Readings from Last 3 Encounters:   03/28/23 (!) 150 kg (331 lb)   02/14/23 (!) 153 kg (337 lb 9.6 oz)   02/08/23 (!) 153  kg (338 lb 3.2 oz)      BP Readings from Last 3 Encounters:   03/28/23 114/78   02/14/23 124/72   02/08/23 151/93        Physical Exam  Vitals reviewed.   Constitutional:       Appearance: Normal appearance.   HENT:      Head: Normocephalic and atraumatic.   Eyes:      Conjunctiva/sclera: Conjunctivae normal.      Pupils: Pupils are equal, round, and reactive to light.   Cardiovascular:      Rate and Rhythm: Normal rate and regular rhythm.      Pulses: Normal pulses.      Heart sounds: Normal heart sounds.   Pulmonary:      Effort: Pulmonary effort is normal.      Breath sounds: Normal breath sounds.   Abdominal:      Palpations: Abdomen is soft.      Tenderness: There is abdominal tenderness in the epigastric area, left upper quadrant and left lower quadrant.   Skin:     General: Skin is warm and dry.   Neurological:      Mental Status: He is alert and oriented to person, place, and time.   Psychiatric:         Mood and Affect: Mood normal.         Behavior: Behavior normal.         Thought Content: Thought content normal.         Judgment: Judgment normal.             Result Review :       Common labs    Common Labs 12/23/22 12/23/22 1/13/23 1/13/23 2/3/23 2/3/23    1228 1228 1514 1514 1014 1014   Glucose  110 (A)  95  123 (A)   BUN  19  19  22 (A)   Creatinine  0.98  0.83  0.88   Sodium  137  139  137   Potassium  4.4  4.5  4.5   Chloride  103  104  103   Calcium  9.2  9.2  9.2   Albumin  4.80  4.5  4.5   Total Bilirubin  0.9  0.8  1.3 (A)   Alkaline Phosphatase  107  103  95   AST (SGOT)  41 (A)  35  35   ALT (SGPT)  70 (A)  66 (A)  71 (A)   WBC 16.50 (A)  11.60 (A)  12.55 (A)    Hemoglobin 17.7  16.1  16.6    Hematocrit 49.8  46.7  47.6    Platelets 296  298  317    (A) Abnormal value                             Assessment and Plan        Diagnoses and all orders for this visit:    1. Recurrent epigastric abdominal pain (Primary)  -     Lipase; Future  -     Amylase; Future  -     Inflammatory Bowel Disease  Panel; Future  -     Celiac Panel Reflex To Titer; Future  -     US Gallbladder; Future  -     NM HIDA Scan With Pharmacological Intervention; Future  -     Comprehensive Metabolic Panel; Future  -     traMADol (ULTRAM) 50 MG tablet; Take 1 tablet by mouth Every 8 (Eight) Hours As Needed for Severe Pain.  Dispense: 60 tablet; Refill: 0    2. Generalized abdominal pain  -     CBC & Differential; Future  -     Lipase; Future  -     Amylase; Future  -     Inflammatory Bowel Disease Panel; Future  -     Celiac Panel Reflex To Titer; Future  -     US Gallbladder; Future  -     NM HIDA Scan With Pharmacological Intervention; Future  -     Comprehensive Metabolic Panel; Future  -     traMADol (ULTRAM) 50 MG tablet; Take 1 tablet by mouth Every 8 (Eight) Hours As Needed for Severe Pain.  Dispense: 60 tablet; Refill: 0    3. Medication management  -     traMADol (ULTRAM) 50 MG tablet; Take 1 tablet by mouth Every 8 (Eight) Hours As Needed for Severe Pain.  Dispense: 60 tablet; Refill: 0    4. Need for hepatitis C screening test  -     Hepatitis C antibody; Future    5. Diarrhea of infectious origin  -     Clostridioides difficile Toxin - Stool, Per Rectum; Future  -     Clostridioides difficile Toxin - Stool, Per Rectum      Follow Up     Return in about 3 months (around 6/28/2023), or if symptoms worsen or fail to improve.    Patient was given instructions and counseling regarding his condition or for health maintenance advice. Please see specific information pulled into the AVS if appropriate.     PILLO Haines

## 2023-03-30 ENCOUNTER — LAB (OUTPATIENT)
Dept: LAB | Facility: HOSPITAL | Age: 33
End: 2023-03-30
Payer: OTHER GOVERNMENT

## 2023-03-30 DIAGNOSIS — Z11.59 NEED FOR HEPATITIS C SCREENING TEST: ICD-10-CM

## 2023-03-30 DIAGNOSIS — R10.84 GENERALIZED ABDOMINAL PAIN: ICD-10-CM

## 2023-03-30 DIAGNOSIS — R10.13 RECURRENT EPIGASTRIC ABDOMINAL PAIN: ICD-10-CM

## 2023-03-30 LAB
027 TOXIN: NORMAL
BASOPHILS # BLD AUTO: 0.08 10*3/MM3 (ref 0–0.2)
BASOPHILS NFR BLD AUTO: 1 % (ref 0–1.5)
C DIFF TOX GENS STL QL NAA+PROBE: NEGATIVE
DEPRECATED RDW RBC AUTO: 36.8 FL (ref 37–54)
EOSINOPHIL # BLD AUTO: 0.16 10*3/MM3 (ref 0–0.4)
EOSINOPHIL NFR BLD AUTO: 2 % (ref 0.3–6.2)
ERYTHROCYTE [DISTWIDTH] IN BLOOD BY AUTOMATED COUNT: 12.1 % (ref 12.3–15.4)
HCT VFR BLD AUTO: 44.5 % (ref 37.5–51)
HCV AB SER DONR QL: NORMAL
HGB BLD-MCNC: 15.6 G/DL (ref 13–17.7)
IMM GRANULOCYTES # BLD AUTO: 0.06 10*3/MM3 (ref 0–0.05)
IMM GRANULOCYTES NFR BLD AUTO: 0.7 % (ref 0–0.5)
LYMPHOCYTES # BLD AUTO: 2.18 10*3/MM3 (ref 0.7–3.1)
LYMPHOCYTES NFR BLD AUTO: 26.8 % (ref 19.6–45.3)
MCH RBC QN AUTO: 29.3 PG (ref 26.6–33)
MCHC RBC AUTO-ENTMCNC: 35.1 G/DL (ref 31.5–35.7)
MCV RBC AUTO: 83.6 FL (ref 79–97)
MONOCYTES # BLD AUTO: 0.56 10*3/MM3 (ref 0.1–0.9)
MONOCYTES NFR BLD AUTO: 6.9 % (ref 5–12)
NEUTROPHILS NFR BLD AUTO: 5.1 10*3/MM3 (ref 1.7–7)
NEUTROPHILS NFR BLD AUTO: 62.6 % (ref 42.7–76)
NRBC BLD AUTO-RTO: 0 /100 WBC (ref 0–0.2)
PLATELET # BLD AUTO: 241 10*3/MM3 (ref 140–450)
PMV BLD AUTO: 11.8 FL (ref 6–12)
RBC # BLD AUTO: 5.32 10*6/MM3 (ref 4.14–5.8)
WBC NRBC COR # BLD: 8.14 10*3/MM3 (ref 3.4–10.8)

## 2023-03-30 PROCEDURE — 86258 DGP ANTIBODY EACH IG CLASS: CPT

## 2023-03-30 PROCEDURE — 80053 COMPREHEN METABOLIC PANEL: CPT

## 2023-03-30 PROCEDURE — 82150 ASSAY OF AMYLASE: CPT

## 2023-03-30 PROCEDURE — 86037 ANCA TITER EACH ANTIBODY: CPT

## 2023-03-30 PROCEDURE — 36415 COLL VENOUS BLD VENIPUNCTURE: CPT

## 2023-03-30 PROCEDURE — 87493 C DIFF AMPLIFIED PROBE: CPT | Performed by: NURSE PRACTITIONER

## 2023-03-30 PROCEDURE — 85025 COMPLETE CBC W/AUTO DIFF WBC: CPT

## 2023-03-30 PROCEDURE — 83690 ASSAY OF LIPASE: CPT

## 2023-03-30 PROCEDURE — 86671 FUNGUS NES ANTIBODY: CPT

## 2023-03-30 PROCEDURE — 86803 HEPATITIS C AB TEST: CPT

## 2023-03-30 PROCEDURE — 86364 TISS TRNSGLTMNASE EA IG CLAS: CPT

## 2023-03-30 PROCEDURE — 82784 ASSAY IGA/IGD/IGG/IGM EACH: CPT

## 2023-03-31 LAB
ALBUMIN SERPL-MCNC: 4.4 G/DL (ref 3.5–5.2)
ALBUMIN/GLOB SERPL: 1.4 G/DL
ALP SERPL-CCNC: 86 U/L (ref 39–117)
ALT SERPL W P-5'-P-CCNC: 60 U/L (ref 1–41)
AMYLASE SERPL-CCNC: 43 U/L (ref 28–100)
ANION GAP SERPL CALCULATED.3IONS-SCNC: 11.4 MMOL/L (ref 5–15)
AST SERPL-CCNC: 38 U/L (ref 1–40)
BILIRUB SERPL-MCNC: 0.9 MG/DL (ref 0–1.2)
BUN SERPL-MCNC: 18 MG/DL (ref 6–20)
BUN/CREAT SERPL: 23.7 (ref 7–25)
CALCIUM SPEC-SCNC: 9 MG/DL (ref 8.6–10.5)
CHLORIDE SERPL-SCNC: 104 MMOL/L (ref 98–107)
CO2 SERPL-SCNC: 24.6 MMOL/L (ref 22–29)
CREAT SERPL-MCNC: 0.76 MG/DL (ref 0.76–1.27)
EGFRCR SERPLBLD CKD-EPI 2021: 124 ML/MIN/1.73
GLIADIN PEPTIDE IGA SER-ACNC: 7 UNITS (ref 0–19)
GLOBULIN UR ELPH-MCNC: 3.1 GM/DL
GLUCOSE SERPL-MCNC: 101 MG/DL (ref 65–99)
IGA SERPL-MCNC: 215 MG/DL (ref 90–386)
LIPASE SERPL-CCNC: 17 U/L (ref 13–60)
POTASSIUM SERPL-SCNC: 4.2 MMOL/L (ref 3.5–5.2)
PROT SERPL-MCNC: 7.5 G/DL (ref 6–8.5)
SODIUM SERPL-SCNC: 140 MMOL/L (ref 136–145)
TTG IGA SER-ACNC: <2 U/ML (ref 0–3)

## 2023-04-03 LAB
BAKER'S YEAST IGA QN: 34.5 UNITS (ref 0–24.9)
BAKER'S YEAST IGG QN: <20 UNITS (ref 0–24.9)
P-ANCA ATYPICAL TITR SER IF: ABNORMAL TITER

## 2023-04-05 ENCOUNTER — PATIENT ROUNDING (BHMG ONLY) (OUTPATIENT)
Dept: FAMILY MEDICINE CLINIC | Facility: CLINIC | Age: 33
End: 2023-04-05
Payer: OTHER GOVERNMENT

## 2023-04-06 DIAGNOSIS — R10.84 GENERALIZED ABDOMINAL PAIN: ICD-10-CM

## 2023-04-06 DIAGNOSIS — A08.4 VIRAL GASTROENTERITIS: ICD-10-CM

## 2023-04-07 RX ORDER — DICYCLOMINE HYDROCHLORIDE 10 MG/1
CAPSULE ORAL
Qty: 120 CAPSULE | Refills: 1 | Status: SHIPPED | OUTPATIENT
Start: 2023-04-07

## 2023-04-14 ENCOUNTER — PREP FOR SURGERY (OUTPATIENT)
Dept: OTHER | Facility: HOSPITAL | Age: 33
End: 2023-04-14
Payer: OTHER GOVERNMENT

## 2023-04-28 ENCOUNTER — HOSPITAL ENCOUNTER (OUTPATIENT)
Dept: NUCLEAR MEDICINE | Facility: HOSPITAL | Age: 33
Discharge: HOME | End: 2023-04-28
Payer: OTHER GOVERNMENT

## 2023-04-28 ENCOUNTER — HOSPITAL ENCOUNTER (OUTPATIENT)
Dept: ULTRASOUND IMAGING | Facility: HOSPITAL | Age: 33
Discharge: HOME | End: 2023-04-28
Payer: OTHER GOVERNMENT

## 2023-04-28 DIAGNOSIS — R10.84 GENERALIZED ABDOMINAL PAIN: ICD-10-CM

## 2023-04-28 DIAGNOSIS — R10.13 RECURRENT EPIGASTRIC ABDOMINAL PAIN: ICD-10-CM

## 2023-04-28 PROCEDURE — 0 TECHNETIUM TC 99M MEBROFENIN KIT: Performed by: NURSE PRACTITIONER

## 2023-04-28 PROCEDURE — 78227 HEPATOBIL SYST IMAGE W/DRUG: CPT

## 2023-04-28 PROCEDURE — 76705 ECHO EXAM OF ABDOMEN: CPT

## 2023-04-28 PROCEDURE — A9537 TC99M MEBROFENIN: HCPCS | Performed by: NURSE PRACTITIONER

## 2023-04-28 RX ORDER — KIT FOR THE PREPARATION OF TECHNETIUM TC 99M MEBROFENIN 45 MG/10ML
1 INJECTION, POWDER, LYOPHILIZED, FOR SOLUTION INTRAVENOUS
Status: COMPLETED | OUTPATIENT
Start: 2023-04-28 | End: 2023-04-28

## 2023-04-28 RX ADMIN — KIT FOR THE PREPARATION OF TECHNETIUM TC 99M MEBROFENIN 1 DOSE: 45 INJECTION, POWDER, LYOPHILIZED, FOR SOLUTION INTRAVENOUS at 10:48

## 2023-05-04 DIAGNOSIS — Z79.899 MEDICATION MANAGEMENT: ICD-10-CM

## 2023-05-04 DIAGNOSIS — R10.13 RECURRENT EPIGASTRIC ABDOMINAL PAIN: ICD-10-CM

## 2023-05-04 DIAGNOSIS — R10.84 GENERALIZED ABDOMINAL PAIN: ICD-10-CM

## 2023-05-04 RX ORDER — TRAMADOL HYDROCHLORIDE 50 MG/1
50 TABLET ORAL EVERY 8 HOURS PRN
Qty: 60 TABLET | Refills: 0 | OUTPATIENT
Start: 2023-05-04

## 2023-05-07 DIAGNOSIS — Z79.899 MEDICATION MANAGEMENT: ICD-10-CM

## 2023-05-07 DIAGNOSIS — R10.13 RECURRENT EPIGASTRIC ABDOMINAL PAIN: ICD-10-CM

## 2023-05-07 DIAGNOSIS — R10.84 GENERALIZED ABDOMINAL PAIN: ICD-10-CM

## 2023-05-08 RX ORDER — TRAMADOL HYDROCHLORIDE 50 MG/1
TABLET ORAL
Qty: 60 TABLET | Refills: 0 | OUTPATIENT
Start: 2023-05-08

## 2023-05-22 NOTE — SIGNIFICANT NOTE
Left message with a call back number and the arrival time of 1030.    Instructed to follow directions from office to prepare for appointment.     Hold all medications morning of procedure. Instructed to bring all medications and inhalers to hospital on day of procedure.     Take any nebulizer treatments of the morning of the procedure as well.    Instructed that a  will be needed for post procedure transport home.

## 2023-05-23 NOTE — PAT
Reviewed the following with patient.    Arrival time of 1030.    Must have  over 18 for transportation home post procedure.    Education provided on laxative administration; bowel prep to be taken in two doses. Reviewed diet instructions for day prior to procedure. Only plain, unflavored water after midnight until two hours prior to arrival time.     Do not take any morning medications on the day of the procedure. Instead bring all prescribed medication and inhalers to the hospital the morning of the procedure. May take any nebulizer treatments or inhalers the morning of the procedure.     Pt verbalized understanding of instructions.

## 2023-05-25 ENCOUNTER — ANESTHESIA EVENT (OUTPATIENT)
Dept: GASTROENTEROLOGY | Facility: HOSPITAL | Age: 33
End: 2023-05-25
Payer: OTHER GOVERNMENT

## 2023-05-25 ENCOUNTER — ANESTHESIA (OUTPATIENT)
Dept: GASTROENTEROLOGY | Facility: HOSPITAL | Age: 33
End: 2023-05-25
Payer: OTHER GOVERNMENT

## 2023-05-25 ENCOUNTER — HOSPITAL ENCOUNTER (OUTPATIENT)
Facility: HOSPITAL | Age: 33
Setting detail: HOSPITAL OUTPATIENT SURGERY
Discharge: HOME | End: 2023-05-25
Attending: INTERNAL MEDICINE | Admitting: INTERNAL MEDICINE
Payer: OTHER GOVERNMENT

## 2023-05-25 VITALS
HEART RATE: 65 BPM | SYSTOLIC BLOOD PRESSURE: 115 MMHG | DIASTOLIC BLOOD PRESSURE: 78 MMHG | HEIGHT: 74 IN | BODY MASS INDEX: 40.43 KG/M2 | RESPIRATION RATE: 17 BRPM | TEMPERATURE: 98.4 F | WEIGHT: 315 LBS | OXYGEN SATURATION: 96 %

## 2023-05-25 DIAGNOSIS — R59.0 MESENTERIC LYMPHADENOPATHY: ICD-10-CM

## 2023-05-25 DIAGNOSIS — Z79.1 NSAID LONG-TERM USE: ICD-10-CM

## 2023-05-25 DIAGNOSIS — K21.9 GASTROESOPHAGEAL REFLUX DISEASE WITHOUT ESOPHAGITIS: ICD-10-CM

## 2023-05-25 DIAGNOSIS — K92.1 BLOOD IN STOOL: ICD-10-CM

## 2023-05-25 DIAGNOSIS — R19.7 DIARRHEA, UNSPECIFIED TYPE: ICD-10-CM

## 2023-05-25 DIAGNOSIS — R11.2 NAUSEA AND VOMITING, UNSPECIFIED VOMITING TYPE: ICD-10-CM

## 2023-05-25 DIAGNOSIS — R10.9 ABDOMINAL PAIN, UNSPECIFIED ABDOMINAL LOCATION: ICD-10-CM

## 2023-05-25 PROCEDURE — 45380 COLONOSCOPY AND BIOPSY: CPT | Performed by: INTERNAL MEDICINE

## 2023-05-25 PROCEDURE — 25010000002 PROPOFOL 10 MG/ML EMULSION: Performed by: NURSE ANESTHETIST, CERTIFIED REGISTERED

## 2023-05-25 PROCEDURE — 88305 TISSUE EXAM BY PATHOLOGIST: CPT | Performed by: INTERNAL MEDICINE

## 2023-05-25 PROCEDURE — 43239 EGD BIOPSY SINGLE/MULTIPLE: CPT | Performed by: INTERNAL MEDICINE

## 2023-05-25 RX ORDER — SODIUM CHLORIDE 9 MG/ML
INJECTION, SOLUTION INTRAVENOUS CONTINUOUS PRN
Status: DISCONTINUED | OUTPATIENT
Start: 2023-05-25 | End: 2023-05-25 | Stop reason: SURG

## 2023-05-25 RX ORDER — DEXMEDETOMIDINE HYDROCHLORIDE 100 UG/ML
INJECTION, SOLUTION INTRAVENOUS AS NEEDED
Status: DISCONTINUED | OUTPATIENT
Start: 2023-05-25 | End: 2023-05-25 | Stop reason: SURG

## 2023-05-25 RX ORDER — PROPOFOL 10 MG/ML
VIAL (ML) INTRAVENOUS AS NEEDED
Status: DISCONTINUED | OUTPATIENT
Start: 2023-05-25 | End: 2023-05-25 | Stop reason: SURG

## 2023-05-25 RX ORDER — GLYCOPYRROLATE 0.2 MG/ML
INJECTION INTRAMUSCULAR; INTRAVENOUS AS NEEDED
Status: DISCONTINUED | OUTPATIENT
Start: 2023-05-25 | End: 2023-05-25 | Stop reason: SURG

## 2023-05-25 RX ORDER — SODIUM CHLORIDE, SODIUM LACTATE, POTASSIUM CHLORIDE, CALCIUM CHLORIDE 600; 310; 30; 20 MG/100ML; MG/100ML; MG/100ML; MG/100ML
30 INJECTION, SOLUTION INTRAVENOUS CONTINUOUS
Status: DISCONTINUED | OUTPATIENT
Start: 2023-05-25 | End: 2023-05-25 | Stop reason: HOSPADM

## 2023-05-25 RX ORDER — LIDOCAINE HYDROCHLORIDE 20 MG/ML
INJECTION, SOLUTION EPIDURAL; INFILTRATION; INTRACAUDAL; PERINEURAL AS NEEDED
Status: DISCONTINUED | OUTPATIENT
Start: 2023-05-25 | End: 2023-05-25 | Stop reason: SURG

## 2023-05-25 RX ADMIN — DEXMEDETOMIDINE HYDROCHLORIDE 10 MCG: 100 INJECTION, SOLUTION, CONCENTRATE INTRAVENOUS at 12:51

## 2023-05-25 RX ADMIN — SODIUM CHLORIDE, POTASSIUM CHLORIDE, SODIUM LACTATE AND CALCIUM CHLORIDE 30 ML/HR: 600; 310; 30; 20 INJECTION, SOLUTION INTRAVENOUS at 11:20

## 2023-05-25 RX ADMIN — SODIUM CHLORIDE: 9 INJECTION, SOLUTION INTRAVENOUS at 12:50

## 2023-05-25 RX ADMIN — DEXMEDETOMIDINE HYDROCHLORIDE 10 MCG: 100 INJECTION, SOLUTION, CONCENTRATE INTRAVENOUS at 12:54

## 2023-05-25 RX ADMIN — PROPOFOL 100 MG: 10 INJECTION, EMULSION INTRAVENOUS at 12:54

## 2023-05-25 RX ADMIN — PROPOFOL 200 MCG/KG/MIN: 10 INJECTION, EMULSION INTRAVENOUS at 12:51

## 2023-05-25 RX ADMIN — GLYCOPYRROLATE 0.2 MG: 0.2 INJECTION INTRAMUSCULAR; INTRAVENOUS at 12:51

## 2023-05-25 RX ADMIN — DEXMEDETOMIDINE HYDROCHLORIDE 10 MCG: 100 INJECTION, SOLUTION, CONCENTRATE INTRAVENOUS at 12:48

## 2023-05-25 RX ADMIN — PROPOFOL 100 MG: 10 INJECTION, EMULSION INTRAVENOUS at 12:51

## 2023-05-25 RX ADMIN — LIDOCAINE HYDROCHLORIDE 100 MG: 20 INJECTION, SOLUTION EPIDURAL; INFILTRATION; INTRACAUDAL; PERINEURAL at 12:51

## 2023-05-25 NOTE — ANESTHESIA PREPROCEDURE EVALUATION
Anesthesia Evaluation     Patient summary reviewed and Nursing notes reviewed   no history of anesthetic complications:  NPO Solid Status: > 8 hours  NPO Liquid Status: > 2 hours           Airway   Mallampati: II  TM distance: >3 FB  Neck ROM: full  No difficulty expected  Dental      Pulmonary - normal exam    breath sounds clear to auscultation  (+) shortness of breath,   Cardiovascular - negative cardio ROS and normal exam  Exercise tolerance: good (4-7 METS)    Rhythm: regular  Rate: normal        Neuro/Psych  (+) headaches, psychiatric history,    GI/Hepatic/Renal/Endo    (+)  GERD,  renal disease stones,     Musculoskeletal     Abdominal    Substance History - negative use     OB/GYN negative ob/gyn ROS         Other   arthritis,      ROS/Med Hx Other: PAT Nursing Notes unavailable.                   Anesthesia Plan    ASA 3     general     (Total IV Anesthesia    Patient understands anesthesia not responsible for dental damage.  )  intravenous induction     Anesthetic plan, risks, benefits, and alternatives have been provided, discussed and informed consent has been obtained with: patient.    Plan discussed with CRNA.        CODE STATUS:
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
## 2023-05-25 NOTE — ANESTHESIA POSTPROCEDURE EVALUATION
Patient: Thai Ball    Procedure Summary     Date: 05/25/23 Room / Location: [facility] ENDOSCOPY 2 / [facility] ENDOSCOPY    Anesthesia Start: 1248 Anesthesia Stop: 1316    Procedures:       ESOPHAGOGASTRODUODENOSCOPY WITH BIOPSIES      COLONOSCOPY WITH BIOPSIES Diagnosis:       Abdominal pain, unspecified abdominal location      Diarrhea, unspecified type      Mesenteric lymphadenopathy      Gastroesophageal reflux disease without esophagitis      Nausea and vomiting, unspecified vomiting type      NSAID long-term use      Blood in stool      (Abdominal pain, unspecified abdominal location [R10.9])      (Diarrhea, unspecified type [R19.7])      (Mesenteric lymphadenopathy [R59.0])      (Gastroesophageal reflux disease without esophagitis [K21.9])      (Nausea and vomiting, unspecified vomiting type [R11.2])      (NSAID long-term use [Z79.1])      (Blood in stool [K92.1])    Surgeons: Anirudh Rogers MD Provider: Wali Corbin MD    Anesthesia Type: general ASA Status: 3          Anesthesia Type: general    Vitals  Vitals Value Taken Time   /76 05/25/23 1325   Temp     Pulse 72 05/25/23 1325   Resp 19 05/25/23 1325   SpO2 96 % 05/25/23 1325           Post Anesthesia Care and Evaluation    Patient location during evaluation: bedside  Patient participation: complete - patient participated  Level of consciousness: awake  Pain management: adequate    Airway patency: patent  PONV Status: none  Cardiovascular status: acceptable  Respiratory status: acceptable  Hydration status: acceptable    Comments: An Anesthesiologist personally participated in the most demanding procedures (including induction and emergence if applicable) in the anesthesia plan, monitored the course of anesthesia administration at frequent intervals and remained physically present and available for immediate diagnosis and treatment of emergencies.

## 2023-05-30 LAB
CYTO UR: NORMAL
LAB AP CASE REPORT: NORMAL
LAB AP CLINICAL INFORMATION: NORMAL
PATH REPORT.FINAL DX SPEC: NORMAL
PATH REPORT.GROSS SPEC: NORMAL

## 2023-06-28 PROBLEM — K58.2 IRRITABLE BOWEL SYNDROME WITH BOTH CONSTIPATION AND DIARRHEA: Status: ACTIVE | Noted: 2023-06-28

## 2023-06-28 PROBLEM — K44.9 HIATAL HERNIA: Status: ACTIVE | Noted: 2023-06-28

## 2023-07-25 ENCOUNTER — TELEPHONE (OUTPATIENT)
Dept: FAMILY MEDICINE CLINIC | Facility: CLINIC | Age: 33
End: 2023-07-25
Payer: OTHER GOVERNMENT

## 2023-07-25 DIAGNOSIS — M51.35 DDD (DEGENERATIVE DISC DISEASE), THORACOLUMBAR: ICD-10-CM

## 2023-07-25 DIAGNOSIS — Z79.899 MEDICATION MANAGEMENT: ICD-10-CM

## 2023-07-25 DIAGNOSIS — K58.2 IRRITABLE BOWEL SYNDROME WITH BOTH CONSTIPATION AND DIARRHEA: Primary | ICD-10-CM

## 2023-07-25 RX ORDER — TRAMADOL HYDROCHLORIDE 50 MG/1
50 TABLET ORAL EVERY 8 HOURS PRN
Qty: 90 TABLET | Refills: 0 | Status: CANCELLED | OUTPATIENT
Start: 2023-07-25

## 2023-07-25 RX ORDER — METHYLPREDNISOLONE 4 MG/1
TABLET ORAL
Qty: 21 TABLET | Refills: 0 | Status: CANCELLED | OUTPATIENT
Start: 2023-07-25

## 2023-07-25 NOTE — TELEPHONE ENCOUNTER
Caller: Thai Ball    Relationship: Self    Best call back number: [phone]    Requested Prescriptions:   Requested Prescriptions     Pending Prescriptions Disp Refills    methylPREDNISolone (MEDROL) 4 MG dose pack 21 tablet 0     Sig: Take as directed on package instructions.    traMADol (ULTRAM) 50 MG tablet 90 tablet 0     Sig: Take 1 tablet by mouth Every 8 (Eight) Hours As Needed for Severe Pain.        Pharmacy where request should be sent: [facility]/PHARMACY #[id number] - [place] - [address] [facility] - [phone] [facility] [phone] FX     Last office visit with prescribing clinician: 6/28/2023   Last telemedicine visit with prescribing clinician: Visit date not found   Next office visit with prescribing clinician: 10/2/2023         Does the patient have less than a 3 day supply:  [x] Yes  [] No        Douglas Renteria Rep   07/25/23 15:01 EDT

## 2023-07-25 NOTE — TELEPHONE ENCOUNTER
Called and spoke with Thai Ball. Pt has been made aware and pt states that he will go to pain management. I have placed the referral

## 2023-07-26 RX ORDER — TRAMADOL HYDROCHLORIDE 50 MG/1
50 TABLET ORAL EVERY 8 HOURS PRN
Qty: 90 TABLET | Refills: 0 | OUTPATIENT
Start: 2023-07-26

## 2023-07-26 RX ORDER — METHYLPREDNISOLONE 4 MG/1
TABLET ORAL
Qty: 21 TABLET | Refills: 0 | OUTPATIENT
Start: 2023-07-26

## 2023-08-25 ENCOUNTER — OFFICE VISIT (OUTPATIENT)
Dept: GASTROENTEROLOGY | Facility: CLINIC | Age: 33
End: 2023-08-25
Payer: OTHER GOVERNMENT

## 2023-08-25 VITALS
HEIGHT: 74 IN | OXYGEN SATURATION: 100 % | WEIGHT: 315 LBS | DIASTOLIC BLOOD PRESSURE: 84 MMHG | HEART RATE: 90 BPM | SYSTOLIC BLOOD PRESSURE: 135 MMHG | BODY MASS INDEX: 40.43 KG/M2

## 2023-08-25 DIAGNOSIS — R11.2 NAUSEA AND VOMITING, UNSPECIFIED VOMITING TYPE: ICD-10-CM

## 2023-08-25 DIAGNOSIS — Z79.1 NSAID LONG-TERM USE: ICD-10-CM

## 2023-08-25 DIAGNOSIS — R10.9 ABDOMINAL PAIN, UNSPECIFIED ABDOMINAL LOCATION: ICD-10-CM

## 2023-08-25 DIAGNOSIS — K21.9 GASTROESOPHAGEAL REFLUX DISEASE WITHOUT ESOPHAGITIS: ICD-10-CM

## 2023-08-25 DIAGNOSIS — R59.0 MESENTERIC LYMPHADENOPATHY: Primary | ICD-10-CM

## 2023-08-25 RX ORDER — GABAPENTIN 300 MG/1
CAPSULE ORAL
COMMUNITY

## 2023-08-25 RX ORDER — TADALAFIL 5 MG/1
TABLET ORAL
COMMUNITY

## 2023-08-25 RX ORDER — SUCRALFATE 1 G/1
1 TABLET ORAL 4 TIMES DAILY
Qty: 120 TABLET | Refills: 3 | Status: SHIPPED | OUTPATIENT
Start: 2023-08-25

## 2023-08-25 RX ORDER — HYDROXYZINE HYDROCHLORIDE 25 MG/1
TABLET, FILM COATED ORAL
COMMUNITY
Start: 2023-08-17

## 2023-08-25 RX ORDER — MIRTAZAPINE 15 MG/1
TABLET, FILM COATED ORAL
COMMUNITY
Start: 2023-08-23

## 2023-08-25 RX ORDER — PANTOPRAZOLE SODIUM 40 MG/1
40 TABLET, DELAYED RELEASE ORAL DAILY
Qty: 30 TABLET | Refills: 5 | Status: SHIPPED | OUTPATIENT
Start: 2023-08-25
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
## 2023-09-02 DIAGNOSIS — R10.13 RECURRENT EPIGASTRIC ABDOMINAL PAIN: ICD-10-CM

## 2023-09-05 RX ORDER — OMEPRAZOLE 40 MG/1
CAPSULE, DELAYED RELEASE ORAL
Qty: 90 CAPSULE | Refills: 1 | Status: SHIPPED | OUTPATIENT
Start: 2023-09-05

## 2023-09-29 ENCOUNTER — LAB (OUTPATIENT)
Dept: LAB | Facility: HOSPITAL | Age: 33
End: 2023-09-29
Payer: OTHER GOVERNMENT

## 2023-09-29 DIAGNOSIS — K58.2 IRRITABLE BOWEL SYNDROME WITH BOTH CONSTIPATION AND DIARRHEA: ICD-10-CM

## 2023-09-29 PROCEDURE — 83036 HEMOGLOBIN GLYCOSYLATED A1C: CPT | Performed by: NURSE PRACTITIONER

## 2023-09-29 PROCEDURE — 36415 COLL VENOUS BLD VENIPUNCTURE: CPT

## 2023-09-29 PROCEDURE — 85025 COMPLETE CBC W/AUTO DIFF WBC: CPT | Performed by: NURSE PRACTITIONER

## 2023-09-29 PROCEDURE — 83516 IMMUNOASSAY NONANTIBODY: CPT

## 2023-09-29 PROCEDURE — 86003 ALLG SPEC IGE CRUDE XTRC EA: CPT

## 2023-09-29 PROCEDURE — 80053 COMPREHEN METABOLIC PANEL: CPT | Performed by: NURSE PRACTITIONER

## 2023-10-02 ENCOUNTER — OFFICE VISIT (OUTPATIENT)
Dept: FAMILY MEDICINE CLINIC | Facility: CLINIC | Age: 33
End: 2023-10-02
Payer: OTHER GOVERNMENT

## 2023-10-02 VITALS
TEMPERATURE: 98.2 F | HEART RATE: 86 BPM | BODY MASS INDEX: 40.43 KG/M2 | SYSTOLIC BLOOD PRESSURE: 122 MMHG | HEIGHT: 74 IN | WEIGHT: 315 LBS | DIASTOLIC BLOOD PRESSURE: 84 MMHG | OXYGEN SATURATION: 97 %

## 2023-10-02 DIAGNOSIS — B37.2 YEAST INFECTION OF THE SKIN: ICD-10-CM

## 2023-10-02 DIAGNOSIS — M25.551 BILATERAL HIP PAIN: ICD-10-CM

## 2023-10-02 DIAGNOSIS — Z79.899 ENCOUNTER FOR LONG-TERM (CURRENT) USE OF HIGH-RISK MEDICATION: ICD-10-CM

## 2023-10-02 DIAGNOSIS — M25.552 BILATERAL HIP PAIN: ICD-10-CM

## 2023-10-02 DIAGNOSIS — R79.89 ELEVATED LFTS: Primary | ICD-10-CM

## 2023-10-02 DIAGNOSIS — M47.812 CERVICAL SPONDYLOSIS: ICD-10-CM

## 2023-10-02 DIAGNOSIS — E66.01 CLASS 3 SEVERE OBESITY DUE TO EXCESS CALORIES WITH SERIOUS COMORBIDITY AND BODY MASS INDEX (BMI) OF 45.0 TO 49.9 IN ADULT: ICD-10-CM

## 2023-10-02 PROBLEM — E66.813 CLASS 3 SEVERE OBESITY DUE TO EXCESS CALORIES WITH SERIOUS COMORBIDITY AND BODY MASS INDEX (BMI) OF 45.0 TO 49.9 IN ADULT: Status: ACTIVE | Noted: 2023-10-02

## 2023-10-02 RX ORDER — UBROGEPANT 100 MG/1
TABLET ORAL
COMMUNITY
Start: 2023-09-29

## 2023-10-02 RX ORDER — SEMAGLUTIDE 1 MG/.5ML
1 INJECTION, SOLUTION SUBCUTANEOUS WEEKLY
Qty: 2 ML | Refills: 1 | Status: SHIPPED | OUTPATIENT
Start: 2023-12-04

## 2023-10-02 RX ORDER — SEMAGLUTIDE 0.5 MG/.5ML
0.5 INJECTION, SOLUTION SUBCUTANEOUS WEEKLY
Qty: 2 ML | Refills: 1 | Status: SHIPPED | OUTPATIENT
Start: 2023-11-06

## 2023-10-02 RX ORDER — CLOTRIMAZOLE 1 G/ML
SOLUTION TOPICAL 2 TIMES DAILY
Qty: 60 ML | Refills: 1 | Status: SHIPPED | OUTPATIENT
Start: 2023-10-02

## 2023-10-02 RX ORDER — TRAMADOL HYDROCHLORIDE 50 MG/1
50 TABLET ORAL EVERY 8 HOURS PRN
Qty: 90 TABLET | Refills: 0 | Status: SHIPPED | OUTPATIENT
Start: 2023-10-02

## 2023-10-02 RX ORDER — GABAPENTIN 600 MG/1
600 TABLET ORAL 3 TIMES DAILY
COMMUNITY
Start: 2023-08-25

## 2023-10-02 RX ORDER — SEMAGLUTIDE 0.25 MG/.5ML
0.25 INJECTION, SOLUTION SUBCUTANEOUS WEEKLY
Qty: 2 ML | Refills: 0 | Status: SHIPPED | OUTPATIENT
Start: 2023-10-02

## 2023-10-02 RX ORDER — CELECOXIB 100 MG/1
200 CAPSULE ORAL 2 TIMES DAILY PRN
COMMUNITY

## 2023-10-02 NOTE — PROGRESS NOTES
Chief Complaint  Med Management (3mo f/u)    Subjective          Thai Ball is a 33 y.o. male who presents to [facility]    History of Present Illness  Elevated liver enzymes greater than one year  Migraines: Emgality working well, migraines 203 a week, rizatriptan doesn't work. ([facility] wrote for a new medication for his migraines)  Went to pain management and he was told he couldn't have anything for pain because of his age.  Has gained 7 pounds since last visit.  Tramadol for pain helps the most.  Has tried phentermine and Topamax in the past year. Took Wellbutrin in the past which caused increased stomach issues and migraines episodes.    PHQ-2 Total Score:     PHQ-9 Total Score:          Review of Systems       Medical History: has a past medical history of Chronic pain, Depression, Fibromyalgia, Kidney stone, Migraine, and Vitamin D deficiency.     Surgical History: has a past surgical history that includes Neck surgery; Esophagogastroduodenoscopy (N/A, 5/25/2023); and Colonoscopy (N/A, 5/25/2023).     Family History: family history is not on file.     Social History: reports that he has never smoked. He has never been exposed to tobacco smoke. His smokeless tobacco use includes chew. He reports current drug use. Drug: Marijuana. He reports that he does not drink alcohol.    Allergies: Compazine [prochlorperazine]      Health Maintenance Due   Topic Date Due    BMI FOLLOWUP  Never done    ANNUAL PHYSICAL  Never done    INFLUENZA VACCINE  08/01/2023    COVID-19 Vaccine (3 - 2023-24 season) 09/01/2023            Current Outpatient Medications:     albuterol sulfate  (90 Base) MCG/ACT inhaler, Inhale 2 puffs Every 6 (Six) Hours As Needed for Wheezing or Shortness of Air., Disp: 18 g, Rfl: 0    baclofen (LIORESAL) 10 MG tablet, Take 1 tablet by mouth 3 (Three) Times a Day., Disp: , Rfl:     DULoxetine (CYMBALTA) 60 MG capsule, duloxetine 60 mg capsule,delayed release,  Disp: , Rfl:     famotidine (PEPCID) 20 MG tablet, TAKE 1 TABLET BY MOUTH TWICE A DAY, Disp: 180 tablet, Rfl: 1    gabapentin (NEURONTIN) 600 MG tablet, Take 1 tablet by mouth 3 (Three) Times a Day., Disp: , Rfl:     galcanezumab-gnlm (Emgality) 120 MG/ML auto-injector pen, Emgality Pen 120 mg/mL subcutaneous pen injector  ADMINISTER 1 ML UNDER THE SKIN EVERY MONTH AS DIRECTED (90 day supply), Disp: , Rfl:     hydrOXYzine (ATARAX) 25 MG tablet, , Disp: , Rfl:     Hyoscyamine Sulfate SL (Levsin/SL) 0.125 MG sublingual tablet, Place 0.125 mg under the tongue 3 (Three) Times a Day As Needed (colon spasms)., Disp: 120 each, Rfl: 1    mirtazapine (REMERON) 15 MG tablet, , Disp: , Rfl:     omeprazole (priLOSEC) 40 MG capsule, TAKE 1 CAPSULE BY MOUTH EVERY DAY, Disp: 90 capsule, Rfl: 1    ondansetron ODT (ZOFRAN-ODT) 4 MG disintegrating tablet, Place 1 tablet on the tongue Every 8 (Eight) Hours As Needed for Nausea or Vomiting., Disp: 20 tablet, Rfl: 0    pantoprazole (PROTONIX) 40 MG EC tablet, Take 1 tablet by mouth Daily., Disp: 30 tablet, Rfl: 5    tadalafil (CIALIS) 5 MG tablet, , Disp: , Rfl:     Ubrelvy 100 MG tablet, , Disp: , Rfl:     celecoxib (CeleBREX) 100 MG capsule, Take 2 capsules by mouth 2 (Two) Times a Day As Needed for Mild Pain., Disp: , Rfl:     clotrimazole (LOTRIMIN) 1 % external solution, Apply  topically to the appropriate area as directed 2 (Two) Times a Day., Disp: 60 mL, Rfl: 1    Pain Reliever Plus 250-250-65 MG per tablet, , Disp: , Rfl:     Semaglutide-Weight Management (Wegovy) 0.25 MG/0.5ML solution auto-injector, Inject 0.25 mg under the skin into the appropriate area as directed 1 (One) Time Per Week., Disp: 2 mL, Rfl: 0    [START ON 11/6/2023] Semaglutide-Weight Management (Wegovy) 0.5 MG/0.5ML solution auto-injector, Inject 0.5 mL under the skin into the appropriate area as directed 1 (One) Time Per Week., Disp: 2 mL, Rfl: 1    [START ON 12/4/2023] Semaglutide-Weight Management (Wegovy)  "1 MG/0.5ML solution auto-injector, Inject 0.5 mL under the skin into the appropriate area as directed 1 (One) Time Per Week., Disp: 2 mL, Rfl: 1    traMADol (ULTRAM) 50 MG tablet, Take 1 tablet by mouth Every 8 (Eight) Hours As Needed for Moderate Pain., Disp: 90 tablet, Rfl: 0      Immunization History   Administered Date(s) Administered    COVID-19 (UNSPECIFIED) 05/25/2021, 06/15/2021    Fluzone (or Fluarix & Flulaval for VFC) >6mos 01/13/2023    TD Preservative Free (Tenivac) 01/13/2023         Objective       Vitals:    10/02/23 1024   BP: 122/84   BP Location: Left arm   Patient Position: Sitting   Cuff Size: Large Adult   Pulse: 86   Temp: 98.2 °F (36.8 °C)   TempSrc: Temporal   SpO2: 97%   Weight: (!) 160 kg (353 lb 9.6 oz)   Height: 188 cm (74\")   PainSc: 0-No pain      Body mass index is 45.4 kg/m².   Wt Readings from Last 3 Encounters:   10/02/23 (!) 160 kg (353 lb 9.6 oz)   08/25/23 (!) 159 kg (350 lb)   06/28/23 (!) 157 kg (346 lb 6.4 oz)      BP Readings from Last 3 Encounters:   10/02/23 122/84   08/25/23 135/84   06/28/23 119/82        Class 3 Severe Obesity (BMI >=40). Obesity-related health conditions include the following: dyslipidemias. Obesity is newly identified. BMI is is above average; BMI management plan is completed. We discussed portion control and increasing exercise.       Physical Exam  Vitals reviewed.   Constitutional:       Appearance: Normal appearance.   HENT:      Head: Normocephalic and atraumatic.   Skin:     General: Skin is warm and dry.   Neurological:      Mental Status: He is alert and oriented to person, place, and time.   Psychiatric:         Mood and Affect: Mood normal.         Behavior: Behavior normal.         Thought Content: Thought content normal.         Judgment: Judgment normal.           Result Review :       Common labs          2/3/2023    10:14 3/30/2023    11:27 9/29/2023    11:52   Common Labs   Glucose 123  101  100    BUN 22  18  14    Creatinine 0.88  " 0.76  0.67    Sodium 137  140  138    Potassium 4.5  4.2  4.5    Chloride 103  104  106    Calcium 9.2  9.0  9.1    Albumin 4.5  4.4  4.4    Total Bilirubin 1.3  0.9  0.8    Alkaline Phosphatase 95  86  98    AST (SGOT) 35  38  52    ALT (SGPT) 71  60  89    WBC 12.55  8.14  10.57    Hemoglobin 16.6  15.6  16.0    Hematocrit 47.6  44.5  47.1    Platelets 317  241  293    Hemoglobin A1C   5.80                       Assessment and Plan        Diagnoses and all orders for this visit:    1. Elevated LFTs (Primary)  -     US Liver; Future    2. Bilateral hip pain  Comments:  GUI reviewed and UDS updated.  Orders:  -     traMADol (ULTRAM) 50 MG tablet; Take 1 tablet by mouth Every 8 (Eight) Hours As Needed for Moderate Pain.  Dispense: 90 tablet; Refill: 0    3. Encounter for long-term (current) use of high-risk medication  -     POC Urine Drug Screen [facility]    4. Cervical spondylosis  -     [id number] Antigen; Future    5. Class 3 severe obesity due to excess calories with serious comorbidity and body mass index (BMI) of 45.0 to 49.9 in adult  Comments:  Has tried and failed phentermine with topamax, wellbutrin, contrave is contraindicated because of other medications he is taking.  Assessment & Plan:  Patient advice:  Recommend at least one hour moderate exercise daily  Recommend eliminate liquid calories; water should be primary beverage  Recommend eliminate snacks between meals  Be mindful of portion size  Whole foods in general are better than processed foods (e.g., fast food)      Orders:  -     Semaglutide-Weight Management (Wegovy) 0.25 MG/0.5ML solution auto-injector; Inject 0.25 mg under the skin into the appropriate area as directed 1 (One) Time Per Week.  Dispense: 2 mL; Refill: 0  -     Semaglutide-Weight Management (Wegovy) 0.5 MG/0.5ML solution auto-injector; Inject 0.5 mL under the skin into the appropriate area as directed 1 (One) Time Per Week.  Dispense: 2 mL; Refill: 1  -      Semaglutide-Weight Management (Wegovy) 1 MG/0.5ML solution auto-injector; Inject 0.5 mL under the skin into the appropriate area as directed 1 (One) Time Per Week.  Dispense: 2 mL; Refill: 1    6. Yeast infection of the skin  -     clotrimazole (LOTRIMIN) 1 % external solution; Apply  topically to the appropriate area as directed 2 (Two) Times a Day.  Dispense: 60 mL; Refill: 1          Follow Up     Return in about 3 months (around 1/2/2024).    Obtained a written consent for GUI query. Discussed the risks and benefits of the use of controlled substances with the patient, including the risks of tolerance and drug dependence.  The patient has been counseled on the need to have an exit strategy, including potentially discontinuing the use of controlled substances.  GUI has or will be reviewed as soon as it becomes available.  Patient was given instructions and counseling regarding his condition or for health maintenance advice. Please see specific information pulled into the AVS if appropriate.     PILLO Haines

## 2023-10-09 LAB
CONV CLASS DESCRIPTION: NORMAL
GLIADIN IGG SER IA-ACNC: 3 UNITS (ref 0–19)
GLIADIN PEPTIDE IGG SER-ACNC: 2 UNITS (ref 0–19)
HIV 1 & 2 AB SER-IMP: NORMAL
Lab: NORMAL
TTG IGA SER-ACNC: <2 U/ML (ref 0–3)
WHEAT IGE QN: <0.1 KU/L

## 2023-10-23 DIAGNOSIS — M25.552 BILATERAL HIP PAIN: ICD-10-CM

## 2023-10-23 DIAGNOSIS — M25.551 BILATERAL HIP PAIN: ICD-10-CM

## 2023-10-24 RX ORDER — TRAMADOL HYDROCHLORIDE 50 MG/1
50 TABLET ORAL EVERY 8 HOURS PRN
Qty: 90 TABLET | Refills: 0 | OUTPATIENT
Start: 2023-10-24

## 2023-10-24 RX ORDER — ALBUTEROL SULFATE 90 UG/1
2 AEROSOL, METERED RESPIRATORY (INHALATION) EVERY 6 HOURS PRN
Qty: 18 G | Refills: 0 | Status: SHIPPED | OUTPATIENT
Start: 2023-10-24

## 2023-10-27 ENCOUNTER — LAB (OUTPATIENT)
Dept: LAB | Facility: HOSPITAL | Age: 33
End: 2023-10-27
Payer: OTHER GOVERNMENT

## 2023-10-27 ENCOUNTER — HOSPITAL ENCOUNTER (OUTPATIENT)
Dept: ULTRASOUND IMAGING | Facility: HOSPITAL | Age: 33
Discharge: HOME | End: 2023-10-27
Payer: OTHER GOVERNMENT

## 2023-10-27 ENCOUNTER — OFFICE VISIT (OUTPATIENT)
Dept: FAMILY MEDICINE CLINIC | Facility: CLINIC | Age: 33
End: 2023-10-27
Payer: OTHER GOVERNMENT

## 2023-10-27 VITALS
HEART RATE: 94 BPM | OXYGEN SATURATION: 98 % | HEIGHT: 74 IN | WEIGHT: 315 LBS | BODY MASS INDEX: 40.43 KG/M2 | SYSTOLIC BLOOD PRESSURE: 127 MMHG | DIASTOLIC BLOOD PRESSURE: 84 MMHG | TEMPERATURE: 98.2 F

## 2023-10-27 DIAGNOSIS — M47.812 CERVICAL SPONDYLOSIS: ICD-10-CM

## 2023-10-27 DIAGNOSIS — R79.89 ELEVATED LFTS: ICD-10-CM

## 2023-10-27 DIAGNOSIS — R19.8 UMBILICUS DISCHARGE: Primary | ICD-10-CM

## 2023-10-27 DIAGNOSIS — E66.01 CLASS 3 SEVERE OBESITY DUE TO EXCESS CALORIES WITH SERIOUS COMORBIDITY AND BODY MASS INDEX (BMI) OF 45.0 TO 49.9 IN ADULT: ICD-10-CM

## 2023-10-27 PROCEDURE — 76705 ECHO EXAM OF ABDOMEN: CPT

## 2023-10-27 PROCEDURE — 87205 SMEAR GRAM STAIN: CPT | Performed by: NURSE PRACTITIONER

## 2023-10-27 PROCEDURE — 87076 CULTURE ANAEROBE IDENT EACH: CPT | Performed by: NURSE PRACTITIONER

## 2023-10-27 PROCEDURE — 87147 CULTURE TYPE IMMUNOLOGIC: CPT | Performed by: NURSE PRACTITIONER

## 2023-10-27 PROCEDURE — 81374 HLA I TYPING 1 ANTIGEN LR: CPT

## 2023-10-27 PROCEDURE — 87186 SC STD MICRODIL/AGAR DIL: CPT | Performed by: NURSE PRACTITIONER

## 2023-10-27 PROCEDURE — 36415 COLL VENOUS BLD VENIPUNCTURE: CPT

## 2023-10-27 PROCEDURE — 87070 CULTURE OTHR SPECIMN AEROBIC: CPT | Performed by: NURSE PRACTITIONER

## 2023-10-27 RX ORDER — SEMAGLUTIDE 0.25 MG/.5ML
0.25 INJECTION, SOLUTION SUBCUTANEOUS WEEKLY
Qty: 2 ML | Refills: 0 | Status: SHIPPED | OUTPATIENT
Start: 2023-10-27

## 2023-10-27 RX ORDER — CEPHALEXIN 500 MG/1
500 CAPSULE ORAL 2 TIMES DAILY
Qty: 20 CAPSULE | Refills: 0 | Status: SHIPPED | OUTPATIENT
Start: 2023-10-27 | End: 2023-11-06

## 2023-10-27 NOTE — PROGRESS NOTES
Chief Complaint  blood in belly button (Started 2 days ago )    Subjective          Thai Ball is a 33 y.o. male who presents to [facility]    History of Present Illness    Patient complains of discharge from belly button, discharge only happened one morning. States he pulled scab off and has pink drainage. States the discharge happen Wednesday. States he had pain yesterday today no pain. States this happened one time previous about 6 months. That is was very similar. States he takes a shower and cleans it out regularly     Over the past year he has noticed an odor from his belly button. But states that he never really paid attention to it before but this is the heaviest he's ever been. States because of pain it makes it hard for him to exercise. States he hasn't been able to get the wegovy       PHQ-2 Total Score:     PHQ-9 Total Score:          Review of Systems   Constitutional:  Negative for chills and fever.          Medical History: has a past medical history of Chronic pain, Depression, Fibromyalgia, Kidney stone, Migraine, and Vitamin D deficiency.     Surgical History: has a past surgical history that includes Neck surgery; Esophagogastroduodenoscopy (N/A, 5/25/2023); and Colonoscopy (N/A, 5/25/2023).     Family History: family history is not on file.     Social History: reports that he has never smoked. He has never been exposed to tobacco smoke. His smokeless tobacco use includes chew. He reports current drug use. Drug: Marijuana. He reports that he does not drink alcohol.    Allergies: Compazine [prochlorperazine]      Health Maintenance Due   Topic Date Due    ANNUAL PHYSICAL  Never done    INFLUENZA VACCINE  08/01/2023    COVID-19 Vaccine (3 - 2023-24 season) 09/01/2023            Current Outpatient Medications:     albuterol sulfate  (90 Base) MCG/ACT inhaler, Inhale 2 puffs Every 6 (Six) Hours As Needed for Wheezing or Shortness of Air., Disp: 18 g,  Rfl: 0    baclofen (LIORESAL) 10 MG tablet, Take 1 tablet by mouth 3 (Three) Times a Day., Disp: , Rfl:     celecoxib (CeleBREX) 100 MG capsule, Take 2 capsules by mouth 2 (Two) Times a Day As Needed for Mild Pain., Disp: , Rfl:     clotrimazole (LOTRIMIN) 1 % external solution, Apply  topically to the appropriate area as directed 2 (Two) Times a Day., Disp: 60 mL, Rfl: 1    DULoxetine (CYMBALTA) 60 MG capsule, duloxetine 60 mg capsule,delayed release, Disp: , Rfl:     famotidine (PEPCID) 20 MG tablet, TAKE 1 TABLET BY MOUTH TWICE A DAY, Disp: 180 tablet, Rfl: 1    gabapentin (NEURONTIN) 600 MG tablet, Take 1 tablet by mouth 3 (Three) Times a Day., Disp: , Rfl:     galcanezumab-gnlm (Emgality) 120 MG/ML auto-injector pen, Emgality Pen 120 mg/mL subcutaneous pen injector  ADMINISTER 1 ML UNDER THE SKIN EVERY MONTH AS DIRECTED (90 day supply), Disp: , Rfl:     hydrOXYzine (ATARAX) 25 MG tablet, , Disp: , Rfl:     Hyoscyamine Sulfate SL (Levsin/SL) 0.125 MG sublingual tablet, Place 0.125 mg under the tongue 3 (Three) Times a Day As Needed (colon spasms)., Disp: 120 each, Rfl: 1    mirtazapine (REMERON) 15 MG tablet, , Disp: , Rfl:     omeprazole (priLOSEC) 40 MG capsule, TAKE 1 CAPSULE BY MOUTH EVERY DAY, Disp: 90 capsule, Rfl: 1    ondansetron ODT (ZOFRAN-ODT) 4 MG disintegrating tablet, Place 1 tablet on the tongue Every 8 (Eight) Hours As Needed for Nausea or Vomiting., Disp: 20 tablet, Rfl: 0    Pain Reliever Plus 250-250-65 MG per tablet, , Disp: , Rfl:     pantoprazole (PROTONIX) 40 MG EC tablet, Take 1 tablet by mouth Daily., Disp: 30 tablet, Rfl: 5    Semaglutide-Weight Management (Wegovy) 0.25 MG/0.5ML solution auto-injector, Inject 0.25 mg under the skin into the appropriate area as directed 1 (One) Time Per Week., Disp: 2 mL, Rfl: 0    [START ON 11/6/2023] Semaglutide-Weight Management (Wegovy) 0.5 MG/0.5ML solution auto-injector, Inject 0.5 mL under the skin into the appropriate area as directed 1 (One)  "Time Per Week., Disp: 2 mL, Rfl: 1    [START ON 12/4/2023] Semaglutide-Weight Management (Wegovy) 1 MG/0.5ML solution auto-injector, Inject 0.5 mL under the skin into the appropriate area as directed 1 (One) Time Per Week., Disp: 2 mL, Rfl: 1    tadalafil (CIALIS) 5 MG tablet, , Disp: , Rfl:     traMADol (ULTRAM) 50 MG tablet, Take 1 tablet by mouth Every 8 (Eight) Hours As Needed for Moderate Pain., Disp: 90 tablet, Rfl: 0    Ubrelvy 100 MG tablet, , Disp: , Rfl:     cephalexin (Keflex) 500 MG capsule, Take 1 capsule by mouth 2 (Two) Times a Day for 10 days., Disp: 20 capsule, Rfl: 0      Immunization History   Administered Date(s) Administered    COVID-19 (UNSPECIFIED) 05/25/2021, 06/15/2021    Fluzone (or Fluarix & Flulaval for VFC) >6mos 01/13/2023    TD Preservative Free (Tenivac) 01/13/2023         Objective       Vitals:    10/27/23 1217   BP: 127/84   BP Location: Left arm   Patient Position: Sitting   Cuff Size: Large Adult   Pulse: 94   Temp: 98.2 °F (36.8 °C)   TempSrc: Temporal   SpO2: 98%   Weight: (!) 163 kg (358 lb 6.4 oz)   Height: 188 cm (74\")   PainSc: 0-No pain      Body mass index is 46.02 kg/m².   Wt Readings from Last 3 Encounters:   10/27/23 (!) 163 kg (358 lb 6.4 oz)   10/02/23 (!) 160 kg (353 lb 9.6 oz)   08/25/23 (!) 159 kg (350 lb)      BP Readings from Last 3 Encounters:   10/27/23 127/84   10/02/23 122/84   08/25/23 135/84                Physical Exam  Vitals reviewed.   Constitutional:       Appearance: Normal appearance.   Skin:     General: Skin is warm and dry.             Comments: Drainage from belly button    Neurological:      Mental Status: He is alert and oriented to person, place, and time.   Psychiatric:         Mood and Affect: Mood normal.         Behavior: Behavior normal.         Thought Content: Thought content normal.         Judgment: Judgment normal.             Result Review :       Common labs          2/3/2023    10:14 3/30/2023    11:27 9/29/2023    11:52   Common " Labs   Glucose 123  101  100    BUN 22  18  14    Creatinine 0.88  0.76  0.67    Sodium 137  140  138    Potassium 4.5  4.2  4.5    Chloride 103  104  106    Calcium 9.2  9.0  9.1    Albumin 4.5  4.4  4.4    Total Bilirubin 1.3  0.9  0.8    Alkaline Phosphatase 95  86  98    AST (SGOT) 35  38  52    ALT (SGPT) 71  60  89    WBC 12.55  8.14  10.57    Hemoglobin 16.6  15.6  16.0    Hematocrit 47.6  44.5  47.1    Platelets 317  241  293    Hemoglobin A1C   5.80                       Assessment and Plan        Diagnoses and all orders for this visit:    1. Umbilicus discharge (Primary)  Comments:  Clean with peroxide 2 times a day  Orders:  -     Wound Culture - Drainage, Stomach; Future  -     cephalexin (Keflex) 500 MG capsule; Take 1 capsule by mouth 2 (Two) Times a Day for 10 days.  Dispense: 20 capsule; Refill: 0  -     Wound Culture - Drainage, Stomach          Follow Up     Return if symptoms worsen or fail to improve.    Patient was given instructions and counseling regarding his condition or for health maintenance advice. Please see specific information pulled into the AVS if appropriate.     Stacy Conn, APRN

## 2023-10-30 LAB
BACTERIA SPEC AEROBE CULT: ABNORMAL
BACTERIA SPEC AEROBE CULT: ABNORMAL
GRAM STN SPEC: ABNORMAL

## 2023-10-30 RX ORDER — CLINDAMYCIN HYDROCHLORIDE 300 MG/1
300 CAPSULE ORAL 2 TIMES DAILY
Qty: 28 CAPSULE | Refills: 0 | Status: SHIPPED | OUTPATIENT
Start: 2023-10-30

## 2023-11-01 RX ORDER — TADALAFIL 5 MG/1
5 TABLET ORAL DAILY PRN
Qty: 30 TABLET | Refills: 1 | Status: SHIPPED | OUTPATIENT
Start: 2023-11-01

## 2023-11-01 NOTE — TELEPHONE ENCOUNTER
Caller: Thai Ball    Relationship: Self    Best call back number:   [phone]    Requested Prescriptions:   Requested Prescriptions     Pending Prescriptions Disp Refills    tadalafil (CIALIS) 5 MG tablet          Pharmacy where request should be sent: [facility] #[id number] - [place], [place] - [address] - [phone]  - [phone] FX     Last office visit with prescribing clinician: 10/27/2023   Last telemedicine visit with prescribing clinician: Visit date not found   Next office visit with prescribing clinician: 1/2/2024     Does the patient have less than a 3 day supply:  [x] Yes  [] No

## 2023-11-02 DIAGNOSIS — M25.551 BILATERAL HIP PAIN: ICD-10-CM

## 2023-11-02 DIAGNOSIS — M25.552 BILATERAL HIP PAIN: ICD-10-CM

## 2023-11-02 NOTE — TELEPHONE ENCOUNTER
Caller: Lizzy Thai E    Relationship: Self    Best call back number: [phone]     Requested Prescriptions:   Requested Prescriptions     Pending Prescriptions Disp Refills    traMADol (ULTRAM) 50 MG tablet 90 tablet 0     Sig: Take 1 tablet by mouth Every 8 (Eight) Hours As Needed for Moderate Pain.        Pharmacy where request should be sent: [facility]ACY #[id number] - [place], [place] - [address] [facility] [phone] [facility] [phone] FX     Last office visit with prescribing clinician: 10/27/2023   Last telemedicine visit with prescribing clinician: Visit date not found   Next office visit with prescribing clinician: 1/2/2024     Additional details provided by patient: PATIENT STATES THAT HE IS COMPLETELY OUT OF MEDICATION. PATIENT STATES THAT NORMALLY HE HAS A 90 DAY SUPPLY CALLED IN. PATIENT STATES THAT A 30 DAY SUPPLY IS OKAY WITH A HIGHER DOSAGE IF POSSIBLE. EITHER WAY HE IS OKAY WITH.     Does the patient have less than a 3 day supply:  [x] Yes  [] No    Would you like a call back once the refill request has been completed: [] Yes [] No    If the office needs to give you a call back, can they leave a voicemail: [] Yes [] No    Douglas Pinon Rep   11/02/23 13:36 EDT

## 2023-11-03 LAB — HLA-B27 QL NAA+PROBE: NEGATIVE

## 2023-11-03 RX ORDER — TRAMADOL HYDROCHLORIDE 50 MG/1
50 TABLET ORAL EVERY 8 HOURS PRN
Qty: 90 TABLET | Refills: 1 | Status: SHIPPED | OUTPATIENT
Start: 2023-11-03

## 2023-11-10 ENCOUNTER — HOSPITAL ENCOUNTER (EMERGENCY)
Facility: HOSPITAL | Age: 33
Discharge: HOME | End: 2023-11-10
Attending: EMERGENCY MEDICINE
Payer: OTHER GOVERNMENT

## 2023-11-10 VITALS
SYSTOLIC BLOOD PRESSURE: 145 MMHG | HEART RATE: 81 BPM | BODY MASS INDEX: 40.43 KG/M2 | WEIGHT: 315 LBS | OXYGEN SATURATION: 95 % | HEIGHT: 74 IN | RESPIRATION RATE: 16 BRPM | TEMPERATURE: 97.8 F | DIASTOLIC BLOOD PRESSURE: 88 MMHG

## 2023-11-10 DIAGNOSIS — A08.4 VIRAL GASTROENTERITIS: Primary | ICD-10-CM

## 2023-11-10 LAB
ALBUMIN SERPL-MCNC: 4.3 G/DL (ref 3.5–5.2)
ALBUMIN/GLOB SERPL: 1.2 G/DL
ALP SERPL-CCNC: 117 U/L (ref 39–117)
ALT SERPL W P-5'-P-CCNC: 74 U/L (ref 1–41)
ANION GAP SERPL CALCULATED.3IONS-SCNC: 8.5 MMOL/L (ref 5–15)
AST SERPL-CCNC: 37 U/L (ref 1–40)
BACTERIA UR QL AUTO: ABNORMAL /HPF
BASOPHILS # BLD AUTO: 0.12 10*3/MM3 (ref 0–0.2)
BASOPHILS NFR BLD AUTO: 0.9 % (ref 0–1.5)
BILIRUB SERPL-MCNC: 0.7 MG/DL (ref 0–1.2)
BILIRUB UR QL STRIP: NEGATIVE
BUN SERPL-MCNC: 14 MG/DL (ref 6–20)
BUN/CREAT SERPL: 16.7 (ref 7–25)
CALCIUM SPEC-SCNC: 9 MG/DL (ref 8.6–10.5)
CHLORIDE SERPL-SCNC: 104 MMOL/L (ref 98–107)
CLARITY UR: CLEAR
CO2 SERPL-SCNC: 25.5 MMOL/L (ref 22–29)
COLOR UR: YELLOW
CREAT SERPL-MCNC: 0.84 MG/DL (ref 0.76–1.27)
DEPRECATED RDW RBC AUTO: 36 FL (ref 37–54)
EGFRCR SERPLBLD CKD-EPI 2021: 119.6 ML/MIN/1.73
EOSINOPHIL # BLD AUTO: 0.3 10*3/MM3 (ref 0–0.4)
EOSINOPHIL NFR BLD AUTO: 2.3 % (ref 0.3–6.2)
ERYTHROCYTE [DISTWIDTH] IN BLOOD BY AUTOMATED COUNT: 11.9 % (ref 12.3–15.4)
FLUAV SUBTYP SPEC NAA+PROBE: NOT DETECTED
FLUBV RNA ISLT QL NAA+PROBE: NOT DETECTED
GLOBULIN UR ELPH-MCNC: 3.5 GM/DL
GLUCOSE SERPL-MCNC: 114 MG/DL (ref 65–99)
GLUCOSE UR STRIP-MCNC: NEGATIVE MG/DL
HCT VFR BLD AUTO: 48.2 % (ref 37.5–51)
HGB BLD-MCNC: 16.5 G/DL (ref 13–17.7)
HGB UR QL STRIP.AUTO: NEGATIVE
HOLD SPECIMEN: NORMAL
HOLD SPECIMEN: NORMAL
HYALINE CASTS UR QL AUTO: ABNORMAL /LPF
IMM GRANULOCYTES # BLD AUTO: 0.18 10*3/MM3 (ref 0–0.05)
IMM GRANULOCYTES NFR BLD AUTO: 1.4 % (ref 0–0.5)
KETONES UR QL STRIP: NEGATIVE
LEUKOCYTE ESTERASE UR QL STRIP.AUTO: ABNORMAL
LIPASE SERPL-CCNC: 15 U/L (ref 13–60)
LYMPHOCYTES # BLD AUTO: 2.16 10*3/MM3 (ref 0.7–3.1)
LYMPHOCYTES NFR BLD AUTO: 16.6 % (ref 19.6–45.3)
MCH RBC QN AUTO: 28.4 PG (ref 26.6–33)
MCHC RBC AUTO-ENTMCNC: 34.2 G/DL (ref 31.5–35.7)
MCV RBC AUTO: 83.1 FL (ref 79–97)
MONOCYTES # BLD AUTO: 0.69 10*3/MM3 (ref 0.1–0.9)
MONOCYTES NFR BLD AUTO: 5.3 % (ref 5–12)
NEUTROPHILS NFR BLD AUTO: 73.5 % (ref 42.7–76)
NEUTROPHILS NFR BLD AUTO: 9.54 10*3/MM3 (ref 1.7–7)
NITRITE UR QL STRIP: NEGATIVE
NRBC BLD AUTO-RTO: 0 /100 WBC (ref 0–0.2)
PH UR STRIP.AUTO: 7 [PH] (ref 5–8)
PLATELET # BLD AUTO: 364 10*3/MM3 (ref 140–450)
PMV BLD AUTO: 11.1 FL (ref 6–12)
POTASSIUM SERPL-SCNC: 4.2 MMOL/L (ref 3.5–5.2)
PROT SERPL-MCNC: 7.8 G/DL (ref 6–8.5)
PROT UR QL STRIP: NEGATIVE
RBC # BLD AUTO: 5.8 10*6/MM3 (ref 4.14–5.8)
RBC # UR STRIP: ABNORMAL /HPF
REF LAB TEST METHOD: ABNORMAL
RSV RNA NPH QL NAA+NON-PROBE: NOT DETECTED
SARS-COV-2 RNA RESP QL NAA+PROBE: NOT DETECTED
SODIUM SERPL-SCNC: 138 MMOL/L (ref 136–145)
SP GR UR STRIP: 1.02 (ref 1–1.03)
SQUAMOUS #/AREA URNS HPF: ABNORMAL /HPF
UROBILINOGEN UR QL STRIP: ABNORMAL
WBC # UR STRIP: ABNORMAL /HPF
WBC NRBC COR # BLD: 12.99 10*3/MM3 (ref 3.4–10.8)
WHOLE BLOOD HOLD COAG: NORMAL
WHOLE BLOOD HOLD SPECIMEN: NORMAL

## 2023-11-10 PROCEDURE — 25810000003 SODIUM CHLORIDE 0.9 % SOLUTION

## 2023-11-10 PROCEDURE — 99283 EMERGENCY DEPT VISIT LOW MDM: CPT

## 2023-11-10 PROCEDURE — 96374 THER/PROPH/DIAG INJ IV PUSH: CPT

## 2023-11-10 PROCEDURE — 81001 URINALYSIS AUTO W/SCOPE: CPT | Performed by: EMERGENCY MEDICINE

## 2023-11-10 PROCEDURE — 83690 ASSAY OF LIPASE: CPT

## 2023-11-10 PROCEDURE — 87637 SARSCOV2&INF A&B&RSV AMP PRB: CPT

## 2023-11-10 PROCEDURE — 25010000002 KETOROLAC TROMETHAMINE PER 15 MG

## 2023-11-10 PROCEDURE — 80053 COMPREHEN METABOLIC PANEL: CPT

## 2023-11-10 PROCEDURE — 85025 COMPLETE CBC W/AUTO DIFF WBC: CPT

## 2023-11-10 PROCEDURE — 25010000002 METOCLOPRAMIDE PER 10 MG

## 2023-11-10 PROCEDURE — 96375 TX/PRO/DX INJ NEW DRUG ADDON: CPT

## 2023-11-10 PROCEDURE — 25010000002 DIPHENHYDRAMINE PER 50 MG

## 2023-11-10 PROCEDURE — 36415 COLL VENOUS BLD VENIPUNCTURE: CPT

## 2023-11-10 RX ORDER — ONDANSETRON 4 MG/1
4 TABLET, ORALLY DISINTEGRATING ORAL EVERY 8 HOURS PRN
Qty: 15 TABLET | Refills: 0 | Status: SHIPPED | OUTPATIENT
Start: 2023-11-10

## 2023-11-10 RX ORDER — PANTOPRAZOLE SODIUM 40 MG/10ML
40 INJECTION, POWDER, LYOPHILIZED, FOR SOLUTION INTRAVENOUS ONCE
Status: COMPLETED | OUTPATIENT
Start: 2023-11-10 | End: 2023-11-10

## 2023-11-10 RX ORDER — METOCLOPRAMIDE HYDROCHLORIDE 5 MG/ML
10 INJECTION INTRAMUSCULAR; INTRAVENOUS ONCE
Status: COMPLETED | OUTPATIENT
Start: 2023-11-10 | End: 2023-11-10

## 2023-11-10 RX ORDER — DIPHENHYDRAMINE HYDROCHLORIDE 50 MG/ML
25 INJECTION INTRAMUSCULAR; INTRAVENOUS ONCE
Status: COMPLETED | OUTPATIENT
Start: 2023-11-10 | End: 2023-11-10

## 2023-11-10 RX ORDER — KETOROLAC TROMETHAMINE 15 MG/ML
30 INJECTION, SOLUTION INTRAMUSCULAR; INTRAVENOUS ONCE
Status: COMPLETED | OUTPATIENT
Start: 2023-11-10 | End: 2023-11-10

## 2023-11-10 RX ORDER — SODIUM CHLORIDE 0.9 % (FLUSH) 0.9 %
10 SYRINGE (ML) INJECTION AS NEEDED
Status: DISCONTINUED | OUTPATIENT
Start: 2023-11-10 | End: 2023-11-10 | Stop reason: HOSPADM

## 2023-11-10 RX ADMIN — DIPHENHYDRAMINE HYDROCHLORIDE 25 MG: 50 INJECTION, SOLUTION INTRAMUSCULAR; INTRAVENOUS at 13:08

## 2023-11-10 RX ADMIN — KETOROLAC TROMETHAMINE 30 MG: 15 INJECTION, SOLUTION INTRAMUSCULAR; INTRAVENOUS at 12:38

## 2023-11-10 RX ADMIN — METOCLOPRAMIDE HYDROCHLORIDE 10 MG: 5 INJECTION INTRAMUSCULAR; INTRAVENOUS at 12:38

## 2023-11-10 RX ADMIN — SODIUM CHLORIDE 500 ML: 9 INJECTION, SOLUTION INTRAVENOUS at 13:08

## 2023-11-10 RX ADMIN — PANTOPRAZOLE SODIUM 40 MG: 40 INJECTION, POWDER, FOR SOLUTION INTRAVENOUS at 12:38
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
## 2023-11-10 NOTE — DISCHARGE INSTRUCTIONS
Your COVID and flu were negative  You have no white blood cell count elevation, your electrolytes are within normal next    Please stay hydrated and drink water, Gatorade or Pedialyte, please eat a bland diet until you start to have improvement of your symptoms  I have sent Zofran for the pharmacy
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
## 2023-11-20 ENCOUNTER — OFFICE VISIT (OUTPATIENT)
Dept: GASTROENTEROLOGY | Facility: CLINIC | Age: 33
End: 2023-11-20
Payer: OTHER GOVERNMENT

## 2023-11-20 VITALS
HEART RATE: 82 BPM | WEIGHT: 315 LBS | SYSTOLIC BLOOD PRESSURE: 132 MMHG | OXYGEN SATURATION: 100 % | DIASTOLIC BLOOD PRESSURE: 80 MMHG | HEIGHT: 74 IN | BODY MASS INDEX: 40.43 KG/M2

## 2023-11-20 DIAGNOSIS — R19.7 DIARRHEA, UNSPECIFIED TYPE: ICD-10-CM

## 2023-11-20 DIAGNOSIS — K82.8 BILIARY DYSKINESIA: ICD-10-CM

## 2023-11-20 DIAGNOSIS — K21.9 GASTROESOPHAGEAL REFLUX DISEASE WITHOUT ESOPHAGITIS: ICD-10-CM

## 2023-11-20 DIAGNOSIS — R11.2 NAUSEA AND VOMITING, UNSPECIFIED VOMITING TYPE: Primary | ICD-10-CM

## 2023-11-20 DIAGNOSIS — R59.0 MESENTERIC LYMPHADENOPATHY: ICD-10-CM

## 2023-11-28 ENCOUNTER — OFFICE VISIT (OUTPATIENT)
Dept: SURGERY | Facility: CLINIC | Age: 33
End: 2023-11-28
Payer: OTHER GOVERNMENT

## 2023-11-28 VITALS — RESPIRATION RATE: 16 BRPM | HEIGHT: 74 IN | BODY MASS INDEX: 40.43 KG/M2 | WEIGHT: 315 LBS

## 2023-11-28 DIAGNOSIS — K81.1 CHRONIC CHOLECYSTITIS: Primary | ICD-10-CM

## 2023-11-28 PROCEDURE — 99203 OFFICE O/P NEW LOW 30 MIN: CPT | Performed by: SURGERY

## 2023-11-28 RX ORDER — ONDANSETRON 2 MG/ML
4 INJECTION INTRAMUSCULAR; INTRAVENOUS EVERY 6 HOURS PRN
OUTPATIENT
Start: 2023-11-28

## 2023-11-28 RX ORDER — SODIUM CHLORIDE 0.9 % (FLUSH) 0.9 %
10 SYRINGE (ML) INJECTION EVERY 12 HOURS SCHEDULED
OUTPATIENT
Start: 2023-11-28

## 2023-11-28 RX ORDER — SODIUM CHLORIDE, SODIUM LACTATE, POTASSIUM CHLORIDE, CALCIUM CHLORIDE 600; 310; 30; 20 MG/100ML; MG/100ML; MG/100ML; MG/100ML
70 INJECTION, SOLUTION INTRAVENOUS CONTINUOUS
OUTPATIENT
Start: 2023-11-28

## 2023-11-28 RX ORDER — INDOCYANINE GREEN AND WATER 25 MG
1.25 KIT INJECTION ONCE
OUTPATIENT
Start: 2023-11-28 | End: 2023-11-28

## 2023-11-28 RX ORDER — SODIUM CHLORIDE 9 MG/ML
40 INJECTION, SOLUTION INTRAVENOUS AS NEEDED
OUTPATIENT
Start: 2023-11-28

## 2023-11-28 RX ORDER — SODIUM CHLORIDE 0.9 % (FLUSH) 0.9 %
10 SYRINGE (ML) INJECTION AS NEEDED
OUTPATIENT
Start: 2023-11-28

## 2023-11-28 NOTE — LETTER
2023     PILLO Garcia  [address] [place] [address]    Patient: Thai Ball   YOB: 1990   Date of Visit: 2023     Dear PILLO Garcia:       Thank you for referring Thai Ball to me for evaluation. Below are the relevant portions of my assessment and plan of care.    If you have questions, please do not hesitate to call me. I look forward to following Thai along with you.         Sincerely,        Iam Mensah MD        CC: PILLO Haines Todd Y, MD  [phone]  Sign when Signing Visit  Inpatient History and Physical Surgical Orders    Preadmission Location:   Preadmission Time:  Facility:  Surgery Date:  Surgery Time:  Preadmission Test date:     Chief Complaint  Outpatient History and Physical / Surgical Orders    Primary Care Provider: Stacy Conn APRN    Referring Provider: Hillary Pedro APRN    Subjective     Patient Name: Thai Ball : 1990    HPI  The patient is a 33-year-old gentleman who presents with chronic upper abdominal discomfort and nausea.  He has had an extensive workup which included a normal CT scan and a normal right upper quadrant ultrasound.  He had a mildly reduced gallbladder ejection fraction on DISIDA scan of 38%.  He describes pretty significant postprandial upper abdominal pain with quite a bit of nausea.  He recently had an EGD which revealed a hiatal hernia but was otherwise normal.  He has been treated with antisecretory therapy as well as Carafate without any improvement in his symptoms.    Past History:  Medical History: has a past medical history of Chronic pain, Depression, Fibromyalgia, Kidney stone, Migraine, Nausea and vomiting (2023), and Vitamin D deficiency.   Surgical History: has a past surgical history that includes Neck surgery; Esophagogastroduodenoscopy (N/A, 2023); and Colonoscopy (N/A, 2023).   Family History: family history is not on  file.   Social History: reports that he has never smoked. He has never been exposed to tobacco smoke. His smokeless tobacco use includes chew. He reports current drug use. Drug: Marijuana. He reports that he does not drink alcohol.  Allergies: Compazine [prochlorperazine]       Current Outpatient Medications:   •  albuterol sulfate  (90 Base) MCG/ACT inhaler, Inhale 2 puffs Every 6 (Six) Hours As Needed for Wheezing or Shortness of Air., Disp: 18 g, Rfl: 0  •  baclofen (LIORESAL) 10 MG tablet, Take 1 tablet by mouth 3 (Three) Times a Day., Disp: , Rfl:   •  celecoxib (CeleBREX) 100 MG capsule, Take 2 capsules by mouth 2 (Two) Times a Day As Needed for Mild Pain., Disp: , Rfl:   •  DULoxetine (CYMBALTA) 60 MG capsule, duloxetine 60 mg capsule,delayed release, Disp: , Rfl:   •  famotidine (PEPCID) 20 MG tablet, TAKE 1 TABLET BY MOUTH TWICE A DAY, Disp: 180 tablet, Rfl: 1  •  gabapentin (NEURONTIN) 600 MG tablet, Take 1 tablet by mouth 3 (Three) Times a Day., Disp: , Rfl:   •  galcanezumab-gnlm (Emgality) 120 MG/ML auto-injector pen, Emgality Pen 120 mg/mL subcutaneous pen injector  ADMINISTER 1 ML UNDER THE SKIN EVERY MONTH AS DIRECTED (90 day supply), Disp: , Rfl:   •  hydrOXYzine (ATARAX) 25 MG tablet, , Disp: , Rfl:   •  Hyoscyamine Sulfate SL (Levsin/SL) 0.125 MG sublingual tablet, Place 0.125 mg under the tongue 3 (Three) Times a Day As Needed (colon spasms)., Disp: 120 each, Rfl: 1  •  mirtazapine (REMERON) 15 MG tablet, , Disp: , Rfl:   •  omeprazole (priLOSEC) 40 MG capsule, TAKE 1 CAPSULE BY MOUTH EVERY DAY, Disp: 90 capsule, Rfl: 1  •  ondansetron ODT (ZOFRAN-ODT) 4 MG disintegrating tablet, Place 1 tablet on the tongue Every 8 (Eight) Hours As Needed for Nausea or Vomiting., Disp: 15 tablet, Rfl: 0  •  Pain Reliever Plus 250-250-65 MG per tablet, , Disp: , Rfl:   •  pantoprazole (PROTONIX) 40 MG EC tablet, Take 1 tablet by mouth Daily., Disp: 30 tablet, Rfl: 5  •  Semaglutide-Weight Management  "(Wegovy) 0.25 MG/0.5ML solution auto-injector, Inject 0.25 mg under the skin into the appropriate area as directed 1 (One) Time Per Week., Disp: 2 mL, Rfl: 0  •  Semaglutide-Weight Management (Wegovy) 0.5 MG/0.5ML solution auto-injector, Inject 0.5 mL under the skin into the appropriate area as directed 1 (One) Time Per Week., Disp: 2 mL, Rfl: 1  •  [START ON 12/4/2023] Semaglutide-Weight Management (Wegovy) 1 MG/0.5ML solution auto-injector, Inject 0.5 mL under the skin into the appropriate area as directed 1 (One) Time Per Week., Disp: 2 mL, Rfl: 1  •  tadalafil (CIALIS) 5 MG tablet, Take 1 tablet by mouth Daily As Needed for Erectile Dysfunction., Disp: 30 tablet, Rfl: 1  •  traMADol (ULTRAM) 50 MG tablet, Take 1 tablet by mouth Every 8 (Eight) Hours As Needed for Moderate Pain., Disp: 90 tablet, Rfl: 1  •  Ubrelvy 100 MG tablet, , Disp: , Rfl:        Objective  Vital Signs:   Resp 16   Ht 188 cm (74.02\")   Wt (!) 160 kg (353 lb)   BMI 45.30 kg/m²       Physical Exam  Vitals and nursing note reviewed.   Constitutional:       Appearance: Normal appearance. The patient is well-developed.   Cardiovascular:      Rate and Rhythm: Normal rate and regular rhythm.   Pulmonary:      Effort: Pulmonary effort is normal.      Breath sounds: Normal air entry.   Abdominal:      General: Bowel sounds are normal.      Palpations: Abdomen is soft.      Skin:     General: Skin is warm and dry.   Neurological:      Mental Status: The patient is alert and oriented to person, place, and time.      Motor: Motor function is intact.   Psychiatric:         Mood and Affect: Mood normal.       Result Review:               Assessment and Plan   Diagnoses and all orders for this visit:    1. Chronic cholecystitis (Primary)  -     Case Request; Standing  -     Follow Anesthesia Guidelines / Protocol; Standing  -     Verify NPO Status; Standing  -     Obtain Informed Consent; Standing  -     Verify / Perform Chlorhexidine Skin Prep; " Standing  -     Verify / Perform Chlorhexidine Skin Prep if Indicated (If Not Already Completed); Standing  -     Insert Peripheral IV; Standing  -     Saline Lock & Maintain IV Access; Standing  -     sodium chloride 0.9 % flush 10 mL  -     sodium chloride 0.9 % flush 10 mL  -     sodium chloride 0.9 % infusion 40 mL  -     lactated ringers infusion  -     ondansetron (ZOFRAN) injection 4 mg  -     indocyanine green (IC-GREEN) injection 1.25 mg  -     Case Request    I have told the patient that this may represent a calculus gallbladder disease.  He has had a pretty extensive workup that revealed no other abnormalities and his treatment for reflux disease has produced no improvement in his symptoms.  I have told him that it would be reasonable to consider proceeding with a cholecystectomy and he indicated that he would like to do that.  I described the procedure of robotic cholecystectomy to him as well as the risk and benefits and he is agreeable to proceeding.    I  Iam Mensah MD  11/28/2023

## 2023-11-28 NOTE — PROGRESS NOTES
Inpatient History and Physical Surgical Orders    Preadmission Location:   Preadmission Time:  Facility:  Surgery Date:  Surgery Time:  Preadmission Test date:     Chief Complaint  Outpatient History and Physical / Surgical Orders    Primary Care Provider: Stacy Conn APRN    Referring Provider: Hillary Pedro APRN    Subjective      Patient Name: Thai Ball : 1990    HPI  The patient is a 33-year-old gentleman who presents with chronic upper abdominal discomfort and nausea.  He has had an extensive workup which included a normal CT scan and a normal right upper quadrant ultrasound.  He had a mildly reduced gallbladder ejection fraction on DISIDA scan of 38%.  He describes pretty significant postprandial upper abdominal pain with quite a bit of nausea.  He recently had an EGD which revealed a hiatal hernia but was otherwise normal.  He has been treated with antisecretory therapy as well as Carafate without any improvement in his symptoms.    Past History:  Medical History: has a past medical history of Chronic pain, Depression, Fibromyalgia, Kidney stone, Migraine, Nausea and vomiting (2023), and Vitamin D deficiency.   Surgical History: has a past surgical history that includes Neck surgery; Esophagogastroduodenoscopy (N/A, 2023); and Colonoscopy (N/A, 2023).   Family History: family history is not on file.   Social History: reports that he has never smoked. He has never been exposed to tobacco smoke. His smokeless tobacco use includes chew. He reports current drug use. Drug: Marijuana. He reports that he does not drink alcohol.  Allergies: Compazine [prochlorperazine]       Current Outpatient Medications:     albuterol sulfate  (90 Base) MCG/ACT inhaler, Inhale 2 puffs Every 6 (Six) Hours As Needed for Wheezing or Shortness of Air., Disp: 18 g, Rfl: 0    baclofen (LIORESAL) 10 MG tablet, Take 1 tablet by mouth 3 (Three) Times a Day., Disp: , Rfl:     celecoxib (CeleBREX)  100 MG capsule, Take 2 capsules by mouth 2 (Two) Times a Day As Needed for Mild Pain., Disp: , Rfl:     DULoxetine (CYMBALTA) 60 MG capsule, duloxetine 60 mg capsule,delayed release, Disp: , Rfl:     famotidine (PEPCID) 20 MG tablet, TAKE 1 TABLET BY MOUTH TWICE A DAY, Disp: 180 tablet, Rfl: 1    gabapentin (NEURONTIN) 600 MG tablet, Take 1 tablet by mouth 3 (Three) Times a Day., Disp: , Rfl:     galcanezumab-gnlm (Emgality) 120 MG/ML auto-injector pen, Emgality Pen 120 mg/mL subcutaneous pen injector  ADMINISTER 1 ML UNDER THE SKIN EVERY MONTH AS DIRECTED (90 day supply), Disp: , Rfl:     hydrOXYzine (ATARAX) 25 MG tablet, , Disp: , Rfl:     Hyoscyamine Sulfate SL (Levsin/SL) 0.125 MG sublingual tablet, Place 0.125 mg under the tongue 3 (Three) Times a Day As Needed (colon spasms)., Disp: 120 each, Rfl: 1    mirtazapine (REMERON) 15 MG tablet, , Disp: , Rfl:     omeprazole (priLOSEC) 40 MG capsule, TAKE 1 CAPSULE BY MOUTH EVERY DAY, Disp: 90 capsule, Rfl: 1    ondansetron ODT (ZOFRAN-ODT) 4 MG disintegrating tablet, Place 1 tablet on the tongue Every 8 (Eight) Hours As Needed for Nausea or Vomiting., Disp: 15 tablet, Rfl: 0    Pain Reliever Plus 250-250-65 MG per tablet, , Disp: , Rfl:     pantoprazole (PROTONIX) 40 MG EC tablet, Take 1 tablet by mouth Daily., Disp: 30 tablet, Rfl: 5    Semaglutide-Weight Management (Wegovy) 0.25 MG/0.5ML solution auto-injector, Inject 0.25 mg under the skin into the appropriate area as directed 1 (One) Time Per Week., Disp: 2 mL, Rfl: 0    Semaglutide-Weight Management (Wegovy) 0.5 MG/0.5ML solution auto-injector, Inject 0.5 mL under the skin into the appropriate area as directed 1 (One) Time Per Week., Disp: 2 mL, Rfl: 1    [START ON 12/4/2023] Semaglutide-Weight Management (Wegovy) 1 MG/0.5ML solution auto-injector, Inject 0.5 mL under the skin into the appropriate area as directed 1 (One) Time Per Week., Disp: 2 mL, Rfl: 1    tadalafil (CIALIS) 5 MG tablet, Take 1 tablet by  "mouth Daily As Needed for Erectile Dysfunction., Disp: 30 tablet, Rfl: 1    traMADol (ULTRAM) 50 MG tablet, Take 1 tablet by mouth Every 8 (Eight) Hours As Needed for Moderate Pain., Disp: 90 tablet, Rfl: 1    Ubrelvy 100 MG tablet, , Disp: , Rfl:        Objective   Vital Signs:   Resp 16   Ht 188 cm (74.02\")   Wt (!) 160 kg (353 lb)   BMI 45.30 kg/m²       Physical Exam  Vitals and nursing note reviewed.   Constitutional:       Appearance: Normal appearance. The patient is well-developed.   Cardiovascular:      Rate and Rhythm: Normal rate and regular rhythm.   Pulmonary:      Effort: Pulmonary effort is normal.      Breath sounds: Normal air entry.   Abdominal:      General: Bowel sounds are normal.      Palpations: Abdomen is soft.      Skin:     General: Skin is warm and dry.   Neurological:      Mental Status: The patient is alert and oriented to person, place, and time.      Motor: Motor function is intact.   Psychiatric:         Mood and Affect: Mood normal.       Result Review :               Assessment and Plan   Diagnoses and all orders for this visit:    1. Chronic cholecystitis (Primary)  -     Case Request; Standing  -     Follow Anesthesia Guidelines / Protocol; Standing  -     Verify NPO Status; Standing  -     Obtain Informed Consent; Standing  -     Verify / Perform Chlorhexidine Skin Prep; Standing  -     Verify / Perform Chlorhexidine Skin Prep if Indicated (If Not Already Completed); Standing  -     Insert Peripheral IV; Standing  -     Saline Lock & Maintain IV Access; Standing  -     sodium chloride 0.9 % flush 10 mL  -     sodium chloride 0.9 % flush 10 mL  -     sodium chloride 0.9 % infusion 40 mL  -     lactated ringers infusion  -     ondansetron (ZOFRAN) injection 4 mg  -     indocyanine green (IC-GREEN) injection 1.25 mg  -     Case Request    I have told the patient that this may represent a calculus gallbladder disease.  He has had a pretty extensive workup that revealed no other " abnormalities and his treatment for reflux disease has produced no improvement in his symptoms.  I have told him that it would be reasonable to consider proceeding with a cholecystectomy and he indicated that he would like to do that.  I described the procedure of robotic cholecystectomy to him as well as the risk and benefits and he is agreeable to proceeding.    I  Iam Mensah MD  11/28/2023

## 2023-12-05 ENCOUNTER — ANESTHESIA EVENT (OUTPATIENT)
Dept: PERIOP | Facility: HOSPITAL | Age: 33
End: 2023-12-05
Payer: OTHER GOVERNMENT

## 2023-12-06 ENCOUNTER — ANESTHESIA (OUTPATIENT)
Dept: PERIOP | Facility: HOSPITAL | Age: 33
End: 2023-12-06
Payer: OTHER GOVERNMENT

## 2023-12-06 ENCOUNTER — HOSPITAL ENCOUNTER (OUTPATIENT)
Facility: HOSPITAL | Age: 33
Setting detail: HOSPITAL OUTPATIENT SURGERY
Discharge: HOME | End: 2023-12-06
Attending: SURGERY | Admitting: SURGERY
Payer: OTHER GOVERNMENT

## 2023-12-06 VITALS
HEIGHT: 74 IN | OXYGEN SATURATION: 92 % | DIASTOLIC BLOOD PRESSURE: 79 MMHG | SYSTOLIC BLOOD PRESSURE: 123 MMHG | TEMPERATURE: 97.1 F | HEART RATE: 74 BPM | BODY MASS INDEX: 40.43 KG/M2 | RESPIRATION RATE: 20 BRPM | WEIGHT: 315 LBS

## 2023-12-06 DIAGNOSIS — K81.1 CHRONIC CHOLECYSTITIS: ICD-10-CM

## 2023-12-06 PROCEDURE — 25810000003 LACTATED RINGERS PER 1000 ML: Performed by: ANESTHESIOLOGY

## 2023-12-06 PROCEDURE — 25010000002 SUGAMMADEX 200 MG/2ML SOLUTION: Performed by: NURSE ANESTHETIST, CERTIFIED REGISTERED

## 2023-12-06 PROCEDURE — 25010000002 INDOCYANINE GREEN 25 MG RECONSTITUTED SOLUTION: Performed by: SURGERY

## 2023-12-06 PROCEDURE — 25010000002 ONDANSETRON PER 1 MG: Performed by: NURSE ANESTHETIST, CERTIFIED REGISTERED

## 2023-12-06 PROCEDURE — 25010000002 MIDAZOLAM PER 1MG: Performed by: ANESTHESIOLOGY

## 2023-12-06 PROCEDURE — 25010000002 BUPIVACAINE (PF) 0.25 % SOLUTION: Performed by: SURGERY

## 2023-12-06 PROCEDURE — 25010000002 DEXAMETHASONE PER 1 MG: Performed by: NURSE ANESTHETIST, CERTIFIED REGISTERED

## 2023-12-06 PROCEDURE — 47562 LAPAROSCOPIC CHOLECYSTECTOMY: CPT | Performed by: SURGERY

## 2023-12-06 PROCEDURE — 25010000002 KETOROLAC TROMETHAMINE PER 15 MG: Performed by: NURSE ANESTHETIST, CERTIFIED REGISTERED

## 2023-12-06 PROCEDURE — S2900 ROBOTIC SURGICAL SYSTEM: HCPCS | Performed by: SURGERY

## 2023-12-06 PROCEDURE — 25010000002 HYDROMORPHONE 1 MG/ML SOLUTION: Performed by: NURSE ANESTHETIST, CERTIFIED REGISTERED

## 2023-12-06 PROCEDURE — 88304 TISSUE EXAM BY PATHOLOGIST: CPT | Performed by: SURGERY

## 2023-12-06 PROCEDURE — 25010000002 FENTANYL CITRATE (PF) 50 MCG/ML SOLUTION: Performed by: NURSE ANESTHETIST, CERTIFIED REGISTERED

## 2023-12-06 PROCEDURE — 25010000002 PROPOFOL 10 MG/ML EMULSION: Performed by: NURSE ANESTHETIST, CERTIFIED REGISTERED

## 2023-12-06 PROCEDURE — 25010000002 DROPERIDOL PER 5 MG: Performed by: ANESTHESIOLOGY

## 2023-12-06 DEVICE — MEDIUM-LARGE LIGATION CLIPS 6 CLIPS/CART
Type: IMPLANTABLE DEVICE | Site: ABDOMEN | Status: FUNCTIONAL
Brand: VAS-Q-CLIP

## 2023-12-06 RX ORDER — IBUPROFEN 600 MG/1
600 TABLET ORAL EVERY 6 HOURS PRN
Status: DISCONTINUED | OUTPATIENT
Start: 2023-12-06 | End: 2023-12-06 | Stop reason: HOSPADM

## 2023-12-06 RX ORDER — DEXMEDETOMIDINE HYDROCHLORIDE 100 UG/ML
INJECTION, SOLUTION INTRAVENOUS AS NEEDED
Status: DISCONTINUED | OUTPATIENT
Start: 2023-12-06 | End: 2023-12-06 | Stop reason: SURG

## 2023-12-06 RX ORDER — SODIUM CHLORIDE, SODIUM LACTATE, POTASSIUM CHLORIDE, CALCIUM CHLORIDE 600; 310; 30; 20 MG/100ML; MG/100ML; MG/100ML; MG/100ML
9 INJECTION, SOLUTION INTRAVENOUS CONTINUOUS PRN
Status: DISCONTINUED | OUTPATIENT
Start: 2023-12-06 | End: 2023-12-06 | Stop reason: SDUPTHER

## 2023-12-06 RX ORDER — ONDANSETRON 2 MG/ML
4 INJECTION INTRAMUSCULAR; INTRAVENOUS ONCE AS NEEDED
Status: DISCONTINUED | OUTPATIENT
Start: 2023-12-06 | End: 2023-12-06 | Stop reason: HOSPADM

## 2023-12-06 RX ORDER — INDOCYANINE GREEN AND WATER 25 MG
1.25 KIT INJECTION ONCE
Status: COMPLETED | OUTPATIENT
Start: 2023-12-06 | End: 2023-12-06

## 2023-12-06 RX ORDER — PROMETHAZINE HYDROCHLORIDE 12.5 MG/1
25 TABLET ORAL ONCE AS NEEDED
Status: DISCONTINUED | OUTPATIENT
Start: 2023-12-06 | End: 2023-12-06 | Stop reason: HOSPADM

## 2023-12-06 RX ORDER — ACETAMINOPHEN 500 MG
1000 TABLET ORAL ONCE
Status: COMPLETED | OUTPATIENT
Start: 2023-12-06 | End: 2023-12-06

## 2023-12-06 RX ORDER — DROPERIDOL 2.5 MG/ML
0.62 INJECTION, SOLUTION INTRAMUSCULAR; INTRAVENOUS ONCE
Status: COMPLETED | OUTPATIENT
Start: 2023-12-06 | End: 2023-12-06

## 2023-12-06 RX ORDER — MIDAZOLAM HYDROCHLORIDE 2 MG/2ML
2 INJECTION, SOLUTION INTRAMUSCULAR; INTRAVENOUS ONCE
Status: COMPLETED | OUTPATIENT
Start: 2023-12-06 | End: 2023-12-06

## 2023-12-06 RX ORDER — SODIUM CHLORIDE 0.9 % (FLUSH) 0.9 %
10 SYRINGE (ML) INJECTION EVERY 12 HOURS SCHEDULED
Status: DISCONTINUED | OUTPATIENT
Start: 2023-12-06 | End: 2023-12-06 | Stop reason: HOSPADM

## 2023-12-06 RX ORDER — BUPIVACAINE HYDROCHLORIDE 2.5 MG/ML
INJECTION, SOLUTION EPIDURAL; INFILTRATION; INTRACAUDAL AS NEEDED
Status: DISCONTINUED | OUTPATIENT
Start: 2023-12-06 | End: 2023-12-06 | Stop reason: HOSPADM

## 2023-12-06 RX ORDER — MIDAZOLAM HYDROCHLORIDE 2 MG/2ML
2 INJECTION, SOLUTION INTRAMUSCULAR; INTRAVENOUS ONCE
Status: DISCONTINUED | OUTPATIENT
Start: 2023-12-06 | End: 2023-12-06 | Stop reason: SDUPTHER

## 2023-12-06 RX ORDER — DEXAMETHASONE SODIUM PHOSPHATE 4 MG/ML
INJECTION, SOLUTION INTRA-ARTICULAR; INTRALESIONAL; INTRAMUSCULAR; INTRAVENOUS; SOFT TISSUE AS NEEDED
Status: DISCONTINUED | OUTPATIENT
Start: 2023-12-06 | End: 2023-12-06 | Stop reason: SURG

## 2023-12-06 RX ORDER — OXYCODONE HYDROCHLORIDE 5 MG/1
5 TABLET ORAL
Status: COMPLETED | OUTPATIENT
Start: 2023-12-06 | End: 2023-12-06

## 2023-12-06 RX ORDER — ACETAMINOPHEN 500 MG
1000 TABLET ORAL ONCE
Status: DISCONTINUED | OUTPATIENT
Start: 2023-12-06 | End: 2023-12-06 | Stop reason: SDUPTHER

## 2023-12-06 RX ORDER — FENTANYL CITRATE 50 UG/ML
INJECTION, SOLUTION INTRAMUSCULAR; INTRAVENOUS AS NEEDED
Status: DISCONTINUED | OUTPATIENT
Start: 2023-12-06 | End: 2023-12-06 | Stop reason: SURG

## 2023-12-06 RX ORDER — ROCURONIUM BROMIDE 10 MG/ML
INJECTION, SOLUTION INTRAVENOUS AS NEEDED
Status: DISCONTINUED | OUTPATIENT
Start: 2023-12-06 | End: 2023-12-06 | Stop reason: SURG

## 2023-12-06 RX ORDER — HYDROCODONE BITARTRATE AND ACETAMINOPHEN 5; 325 MG/1; MG/1
1 TABLET ORAL EVERY 6 HOURS PRN
Qty: 10 TABLET | Refills: 0 | Status: SHIPPED | OUTPATIENT
Start: 2023-12-06

## 2023-12-06 RX ORDER — ONDANSETRON 2 MG/ML
4 INJECTION INTRAMUSCULAR; INTRAVENOUS EVERY 6 HOURS PRN
Status: DISCONTINUED | OUTPATIENT
Start: 2023-12-06 | End: 2023-12-06 | Stop reason: HOSPADM

## 2023-12-06 RX ORDER — ONDANSETRON 2 MG/ML
INJECTION INTRAMUSCULAR; INTRAVENOUS AS NEEDED
Status: DISCONTINUED | OUTPATIENT
Start: 2023-12-06 | End: 2023-12-06 | Stop reason: SURG

## 2023-12-06 RX ORDER — KETOROLAC TROMETHAMINE 30 MG/ML
INJECTION, SOLUTION INTRAMUSCULAR; INTRAVENOUS AS NEEDED
Status: DISCONTINUED | OUTPATIENT
Start: 2023-12-06 | End: 2023-12-06 | Stop reason: SURG

## 2023-12-06 RX ORDER — SODIUM CHLORIDE, SODIUM LACTATE, POTASSIUM CHLORIDE, CALCIUM CHLORIDE 600; 310; 30; 20 MG/100ML; MG/100ML; MG/100ML; MG/100ML
70 INJECTION, SOLUTION INTRAVENOUS CONTINUOUS
Status: DISCONTINUED | OUTPATIENT
Start: 2023-12-06 | End: 2023-12-06 | Stop reason: HOSPADM

## 2023-12-06 RX ORDER — SODIUM CHLORIDE, SODIUM LACTATE, POTASSIUM CHLORIDE, CALCIUM CHLORIDE 600; 310; 30; 20 MG/100ML; MG/100ML; MG/100ML; MG/100ML
9 INJECTION, SOLUTION INTRAVENOUS CONTINUOUS PRN
Status: DISCONTINUED | OUTPATIENT
Start: 2023-12-06 | End: 2023-12-06 | Stop reason: HOSPADM

## 2023-12-06 RX ORDER — MEPERIDINE HYDROCHLORIDE 25 MG/ML
12.5 INJECTION INTRAMUSCULAR; INTRAVENOUS; SUBCUTANEOUS
Status: DISCONTINUED | OUTPATIENT
Start: 2023-12-06 | End: 2023-12-06 | Stop reason: HOSPADM

## 2023-12-06 RX ORDER — LIDOCAINE HYDROCHLORIDE 20 MG/ML
INJECTION, SOLUTION EPIDURAL; INFILTRATION; INTRACAUDAL; PERINEURAL AS NEEDED
Status: DISCONTINUED | OUTPATIENT
Start: 2023-12-06 | End: 2023-12-06 | Stop reason: SURG

## 2023-12-06 RX ORDER — ONDANSETRON 4 MG/1
4 TABLET, FILM COATED ORAL ONCE AS NEEDED
Status: DISCONTINUED | OUTPATIENT
Start: 2023-12-06 | End: 2023-12-06 | Stop reason: HOSPADM

## 2023-12-06 RX ORDER — PROPOFOL 10 MG/ML
VIAL (ML) INTRAVENOUS AS NEEDED
Status: DISCONTINUED | OUTPATIENT
Start: 2023-12-06 | End: 2023-12-06 | Stop reason: SURG

## 2023-12-06 RX ORDER — HYDROCODONE BITARTRATE AND ACETAMINOPHEN 5; 325 MG/1; MG/1
1 TABLET ORAL ONCE AS NEEDED
Status: DISCONTINUED | OUTPATIENT
Start: 2023-12-06 | End: 2023-12-06 | Stop reason: HOSPADM

## 2023-12-06 RX ORDER — SODIUM CHLORIDE 9 MG/ML
40 INJECTION, SOLUTION INTRAVENOUS AS NEEDED
Status: DISCONTINUED | OUTPATIENT
Start: 2023-12-06 | End: 2023-12-06 | Stop reason: HOSPADM

## 2023-12-06 RX ORDER — SODIUM CHLORIDE 0.9 % (FLUSH) 0.9 %
10 SYRINGE (ML) INJECTION AS NEEDED
Status: DISCONTINUED | OUTPATIENT
Start: 2023-12-06 | End: 2023-12-06 | Stop reason: HOSPADM

## 2023-12-06 RX ADMIN — SUGAMMADEX 200 MG: 100 INJECTION, SOLUTION INTRAVENOUS at 10:43

## 2023-12-06 RX ADMIN — ROCURONIUM BROMIDE 100 MG: 50 INJECTION INTRAVENOUS at 09:34

## 2023-12-06 RX ADMIN — ONDANSETRON 4 MG: 2 INJECTION INTRAMUSCULAR; INTRAVENOUS at 10:56

## 2023-12-06 RX ADMIN — FENTANYL CITRATE 100 MCG: 50 INJECTION, SOLUTION INTRAMUSCULAR; INTRAVENOUS at 09:34

## 2023-12-06 RX ADMIN — HYDROMORPHONE HYDROCHLORIDE 0.5 MG: 1 INJECTION, SOLUTION INTRAMUSCULAR; INTRAVENOUS; SUBCUTANEOUS at 11:27

## 2023-12-06 RX ADMIN — LIDOCAINE HYDROCHLORIDE 100 MG: 20 INJECTION, SOLUTION EPIDURAL; INFILTRATION; INTRACAUDAL; PERINEURAL at 09:34

## 2023-12-06 RX ADMIN — KETOROLAC TROMETHAMINE 30 MG: 30 INJECTION, SOLUTION INTRAMUSCULAR; INTRAVENOUS at 09:57

## 2023-12-06 RX ADMIN — OXYCODONE HYDROCHLORIDE 5 MG: 5 TABLET ORAL at 12:08

## 2023-12-06 RX ADMIN — SODIUM CHLORIDE, POTASSIUM CHLORIDE, SODIUM LACTATE AND CALCIUM CHLORIDE: 600; 310; 30; 20 INJECTION, SOLUTION INTRAVENOUS at 10:13

## 2023-12-06 RX ADMIN — ONDANSETRON 4 MG: 2 INJECTION INTRAMUSCULAR; INTRAVENOUS at 09:57

## 2023-12-06 RX ADMIN — ACETAMINOPHEN 1000 MG: 500 TABLET ORAL at 08:50

## 2023-12-06 RX ADMIN — SUGAMMADEX 200 MG: 100 INJECTION, SOLUTION INTRAVENOUS at 10:39

## 2023-12-06 RX ADMIN — SODIUM CHLORIDE, POTASSIUM CHLORIDE, SODIUM LACTATE AND CALCIUM CHLORIDE 9 ML/HR: 600; 310; 30; 20 INJECTION, SOLUTION INTRAVENOUS at 08:52

## 2023-12-06 RX ADMIN — DEXMEDETOMIDINE 50 MCG: 100 INJECTION, SOLUTION, CONCENTRATE INTRAVENOUS at 09:37

## 2023-12-06 RX ADMIN — DROPERIDOL 0.62 MG: 2.5 INJECTION, SOLUTION INTRAMUSCULAR; INTRAVENOUS at 11:30

## 2023-12-06 RX ADMIN — HYDROMORPHONE HYDROCHLORIDE 0.25 MG: 1 INJECTION, SOLUTION INTRAMUSCULAR; INTRAVENOUS; SUBCUTANEOUS at 11:11

## 2023-12-06 RX ADMIN — OXYCODONE HYDROCHLORIDE 5 MG: 5 TABLET ORAL at 11:46

## 2023-12-06 RX ADMIN — MIDAZOLAM HYDROCHLORIDE 2 MG: 1 INJECTION, SOLUTION INTRAMUSCULAR; INTRAVENOUS at 09:04

## 2023-12-06 RX ADMIN — DEXAMETHASONE SODIUM PHOSPHATE 4 MG: 4 INJECTION, SOLUTION INTRAMUSCULAR; INTRAVENOUS at 09:44

## 2023-12-06 RX ADMIN — HYDROMORPHONE HYDROCHLORIDE 0.25 MG: 1 INJECTION, SOLUTION INTRAMUSCULAR; INTRAVENOUS; SUBCUTANEOUS at 11:39

## 2023-12-06 RX ADMIN — PROPOFOL 200 MG: 10 INJECTION, EMULSION INTRAVENOUS at 09:34

## 2023-12-06 RX ADMIN — INDOCYANINE GREEN AND WATER 1.25 MG: KIT at 08:50

## 2023-12-06 RX ADMIN — HYDROMORPHONE HYDROCHLORIDE 0.5 MG: 1 INJECTION, SOLUTION INTRAMUSCULAR; INTRAVENOUS; SUBCUTANEOUS at 11:16

## 2023-12-06 NOTE — ANESTHESIA POSTPROCEDURE EVALUATION
Patient: Thai Ball    Procedure Summary       Date: 12/06/23 Room / Location: [facility] OR  / [facility]    Anesthesia Start: 0924 Anesthesia Stop: 1052    Procedure: ROBOT ASSISTED LAPAROSCOPIC CHOLECYSTECTOMY (Abdomen) Diagnosis:       Chronic cholecystitis      (Chronic cholecystitis [K81.1])    Surgeons: Iam Mensah MD Provider: Anton Hernandez MD    Anesthesia Type: general ASA Status: 3            Anesthesia Type: general    Vitals  Vitals Value Taken Time   /61 12/06/23 1157   Temp 36.2 °C (97.1 °F) 12/06/23 1051   Pulse 72 12/06/23 1157   Resp 20 12/06/23 1051   SpO2 92 % 12/06/23 1157           Post Anesthesia Care and Evaluation    Patient location during evaluation: PACU  Patient participation: complete - patient participated  Level of consciousness: awake  Pain management: adequate    Airway patency: patent  Anesthetic complications: No anesthetic complications  PONV Status: none  Cardiovascular status: acceptable and stable  Respiratory status: acceptable  Hydration status: acceptable    Comments: An Anesthesiologist personally participated in the most demanding procedures (including induction and emergence if applicable) in the anesthesia plan, monitored the course of anesthesia administration at frequent intervals and remained physically present and available for immediate diagnosis and treatment of emergencies.

## 2023-12-06 NOTE — OP NOTE
CHOLECYSTECTOMY LAPAROSCOPIC WITH DAVINCI ROBOT  Procedure Report    Patient Name:  Thai Ball  YOB: 1990    Date of Surgery:  12/6/2023     Indications: The patient is a 33-year-old gentleman who presents with chronic cholecystitis.  The decision was made to proceed with a robotic cholecystectomy.    Pre-op Diagnosis: Chronic cholecystitis    Post-Op Diagnosis: Same    Procedure/CPT® Codes:    Robotic cholecystectomy none    Staff:  Surgeon(s):  Iam Mensah MD    Assistant: Guille Lewis    Anesthesia: General    Estimated Blood Loss: 10 mL    Implants:    Implant Name Type Inv. Item Serial No.  Lot No. LRB No. Used Action   CARTRDG CLIP LIGAT VASQCLIP [id number] MD/LG STRL - [id number] Implant CARTRDG CLIP LIGAT VASQCLIP [id number] MD/LG STRL  [email or web address] [id number] N/A 2 Implanted       Specimen:          Specimens       ID Source Type Tests Collected By Collected At Frozen?    A Gallbladder Tissue TISSUE PATHOLOGY EXAM   Iam Mensah MD 12/6/23 1025                 Findings: None    Complications: None    Description of Procedure: The patient was taken the operating room and placed on the table in supine position.  After induction of general anesthesia, the abdomen was prepped and draped sterilely.  The abdomen was insufflated with a Veress needle and a 5 mm right flank port was then placed in the peritoneal cavity using a Visiport.  Two 8 mm da Az robotic trocars and one 12 mm da Az robotic trocar were then placed in the peritoneum under direct vision.  The da Az robot was then brought to the table and the robotic arms were docked to the trocars.  The camera and instruments were then placed in the peritoneal cavity.  The gallbladder was grasped by the assistant and retracted cephalad.  The infundibulum was grasped and retracted laterally.  We then dissected out the gallbladder cystic duct junction and obtained a critical view of safety.  The  camera was switched over to firefly mode and we clearly saw the cystic duct coming up into the infundibulum of the gallbladder.  The cystic duct was clipped twice proximally and once distally with Hem-o-sil clips and then divided with the cutting current of the cautery.  The cystic artery was then dissected and clipped twice proximally once distally with Hem-o-sil clips and divided with the cutting current of the cautery.  The gallbladder was then dissected free from the gallbladder fossa with cautery.  The gallbladder was placed in an Endopouch bag.  The operative field was irrigated out and suctioned dry.  We appear to have good hemostasis.  We removed the camera and instruments from the abdomen and undocked the robotic arms from the trocars.  The 12 mm port site was closed with a Willis-Jeremy closure device and a 0 Mersilene tie.  After desufflated the abdomen the other ports were removed.  All skin incisions were closed with 4-0 Vicryl subcuticular sutures.  Sterile dressings were applied and he was taken the postanesthesia recovery room in stable condition.    Assistant: Guille Lewis  was responsible for performing the following activities: Retraction, Suction, Irrigation, Placing Dressing, and Held/Positioned Camera and their skilled assistance was necessary for the success of this case.    Iam Mensah MD     Date: 12/6/2023  Time: 10:54 EST

## 2023-12-06 NOTE — DISCHARGE INSTRUCTIONS
DISCHARGE INSTRUCTIONS LAPAROSCOPIC CHOLECYSTECTOMY/APPENDECTOMY  (GALL BLADDER)      For your surgery you had:  General anesthesia (you may have a sore throat for the first 24 hours)      You may experience dizziness, drowsiness, or light-headedness for several hours following surgery.  Do not stay alone tonight.  Limit your activity for 24 hours.  Resume your diet slowly.  Follow whatever special dietary instructions you may have been given by your doctor.  You should not drive, operate machinery, drink alcohol, or sign legally binding documents for 24 hours or while you are taking pain medication.  Last dose of pain medication was given at:  Tylenol at 0850am may start pain meds/ tylenol after 1250pm  .    NOTIFY YOUR DOCTOR IF YOU EXPERIENCE ANY OF THE FOLLOWING:  Temperature greater than 101 degrees Fahrenheit  Shaking Chills  Redness or excessive drainage from incision  Nausea, vomiting and/or pain that is not controlled by prescribed medications  Increase in bleeding or bleeding that is excessive  Unable to urinate in 6 hours after surgery  If unable to reach your doctor, please go to the closest Emergency Room You may remove Band-Aid/dressing  48 hrs  .  You may shower in 48 hrs wash abd 1st with clean washcloth  .  Apply an ice pack 24-48 hours.  You may experience gas discomfort 24-48 hours after discharge, especially in chest and shoulders.  Changing position frequently may alleviate this discomfort.  If you have excessive pain, swelling, redness, drainage or other problems, notify your physician.  If unable to urinate in 6 to 8 hours after surgery or urinating frequently in small amounts, notify your doctor or go to the nearest Emergency Room.  Medications per physician instructions as indicated on Discharge Medication Information Sheet.  You should see Dr. Mensah  for follow-up care  on as scheduled/ as needed  .  Phone number: [phone]      SPECIAL INSTRUCTIONS:  May take an over the counter  stool softener as needed ( mom, mIralax, colace)

## 2023-12-06 NOTE — ANESTHESIA PREPROCEDURE EVALUATION
Anesthesia Evaluation     Patient summary reviewed and Nursing notes reviewed   no history of anesthetic complications:   NPO Solid Status: > 8 hours  NPO Liquid Status: > 2 hours           Airway   Mallampati: I  TM distance: >3 FB  Neck ROM: full  No difficulty expected and Large neck circumference  Dental      Comment: No loose teeth per pt    Pulmonary - negative pulmonary ROS    breath sounds clear to auscultation  Cardiovascular - negative cardio ROS    Rhythm: regular  Rate: normal        Neuro/Psych  (+) headaches, psychiatric history Anxiety and Depression  GI/Hepatic/Renal/Endo    (+) morbid obesity, hiatal hernia, GERD, renal disease- stones    Musculoskeletal     Abdominal   (+) obese   Substance History - negative use     OB/GYN negative ob/gyn ROS         Other   arthritis,     ROS/Med Hx Other: PAT Nursing Notes unavailable.               Anesthesia Plan    ASA 3     general     (Patient understands anesthesia not responsible for dental damage.)  intravenous induction     Anesthetic plan, risks, benefits, and alternatives have been provided, discussed and informed consent has been obtained with: patient and spouse/significant other.    Plan discussed with CRNA.    CODE STATUS:

## 2023-12-07 ENCOUNTER — PATIENT MESSAGE (OUTPATIENT)
Dept: SURGERY | Facility: CLINIC | Age: 33
End: 2023-12-07
Payer: OTHER GOVERNMENT

## 2023-12-13 ENCOUNTER — TELEPHONE (OUTPATIENT)
Dept: FAMILY MEDICINE CLINIC | Facility: CLINIC | Age: 33
End: 2023-12-13

## 2023-12-13 NOTE — TELEPHONE ENCOUNTER
Caller: Thai Ball    Relationship to patient: Self    Best call back number: [phone]     Patient is needing: PATIENT STATES HIS BELLY BUTTON IS LEAKING BLOOD. PATIENT STATES HE USED A QTIP TO CLEAN OUT HIS BELLY BUTTON AND IT HAS BLOOD ON IT. PATIENT WANTS A CALL BACK TO ADVISE.

## 2023-12-14 ENCOUNTER — OFFICE VISIT (OUTPATIENT)
Dept: FAMILY MEDICINE CLINIC | Facility: CLINIC | Age: 33
End: 2023-12-14
Payer: OTHER GOVERNMENT

## 2023-12-14 VITALS
TEMPERATURE: 98.1 F | HEIGHT: 74 IN | HEART RATE: 86 BPM | BODY MASS INDEX: 40.43 KG/M2 | OXYGEN SATURATION: 97 % | SYSTOLIC BLOOD PRESSURE: 122 MMHG | DIASTOLIC BLOOD PRESSURE: 74 MMHG | WEIGHT: 315 LBS

## 2023-12-14 DIAGNOSIS — R19.8 UMBILICAL BLEEDING: Primary | ICD-10-CM

## 2023-12-14 RX ORDER — CELECOXIB 200 MG/1
CAPSULE ORAL
COMMUNITY
Start: 2023-12-05

## 2023-12-14 RX ORDER — MELOXICAM 7.5 MG/1
TABLET ORAL
COMMUNITY
Start: 2023-12-05

## 2023-12-14 RX ORDER — GABAPENTIN 300 MG/1
CAPSULE ORAL
COMMUNITY
Start: 2023-12-05

## 2023-12-14 NOTE — PROGRESS NOTES
Chief Complaint  blood in belly button (Started yesterday. Pt states giving him stomach ache. )    Subjective      Thai Ball is a 33 year old male that presents to [facility] with c/o a previous infection in his belly button. He states that last night he was having a bad stomachache around the belly button. He put a qtip inside and noticed some bleeding again. He denies fever or chills. No bleeding or drainage today. He notes a previous umbilical infection that he was treated with antibiotics for.  He is status post cholecystectomy on 12/6/23. Follow up with surgery next week.        History of Present Illness    Current Outpatient Medications   Medication Instructions    albuterol sulfate  (90 Base) MCG/ACT inhaler 2 puffs, Inhalation, Every 6 Hours PRN    baclofen (LIORESAL) 10 mg, Oral, 3 Times Daily    celecoxib (CeleBREX) 200 MG capsule     celecoxib (CELEBREX) 200 mg, Oral, 2 Times Daily PRN    DULoxetine (CYMBALTA) 60 MG capsule duloxetine 60 mg capsule,delayed release    famotidine (PEPCID) 20 MG tablet TAKE 1 TABLET BY MOUTH TWICE A DAY    gabapentin (NEURONTIN) 300 MG capsule     gabapentin (NEURONTIN) 600 mg, Oral, 3 Times Daily    galcanezumab-gnlm (Emgality) 120 MG/ML auto-injector pen Emgality Pen 120 mg/mL subcutaneous pen injector   ADMINISTER 1 ML UNDER THE SKIN EVERY MONTH AS DIRECTED (90 day supply)    HYDROcodone-acetaminophen (NORCO) 5-325 MG per tablet 1 tablet, Oral, Every 6 Hours PRN    hydrOXYzine (ATARAX) 25 MG tablet Take 1 tablet by mouth Daily.    Hyoscyamine Sulfate SL (LEVSIN/SL) 0.125 mg, Sublingual, 3 Times Daily PRN    meloxicam (MOBIC) 7.5 MG tablet     mirtazapine (REMERON) 15 MG tablet Take 1 tablet by mouth Every Night.    omeprazole (priLOSEC) 40 MG capsule TAKE 1 CAPSULE BY MOUTH EVERY DAY    ondansetron ODT (ZOFRAN-ODT) 4 mg, Translingual, Every 8 Hours PRN    Pain Reliever Plus 250-250-65 MG per tablet No dose, route, or  "frequency recorded.    pantoprazole (PROTONIX) 40 mg, Oral, Daily    tadalafil (CIALIS) 5 mg, Oral, Daily PRN    traMADol (ULTRAM) 50 mg, Oral, Every 8 Hours PRN    Ubrelvy 100 MG tablet Take 1 tablet by mouth 1 (One) Time As Needed.    Wegovy 0.5 mg, Subcutaneous, Weekly    Wegovy 1 mg, Subcutaneous, Weekly    Wegovy 0.25 mg, Subcutaneous, Weekly       The following portions of the patient's history were reviewed and updated as appropriate: allergies, current medications, past family history, past medical history, past social history, past surgical history, and problem list.    Objective   Vital Signs:   /74 (BP Location: Right arm, Patient Position: Sitting)   Pulse 86   Temp 98.1 °F (36.7 °C) (Temporal)   Ht 188 cm (74\")   Wt (!) 159 kg (350 lb)   SpO2 97%   BMI 44.94 kg/m²     Wt Readings from Last 3 Encounters:   12/14/23 (!) 159 kg (350 lb)   12/06/23 (!) 159 kg (350 lb 15.6 oz)   11/28/23 (!) 160 kg (353 lb)     BP Readings from Last 3 Encounters:   12/14/23 122/74   12/06/23 123/79   11/20/23 132/80     Physical Exam  Vitals reviewed.   Constitutional:       Appearance: Normal appearance. He is well-developed. He is obese. He is not ill-appearing.   HENT:      Head: Normocephalic and atraumatic.      Mouth/Throat:      Pharynx: No oropharyngeal exudate.   Eyes:      Conjunctiva/sclera: Conjunctivae normal.      Pupils: Pupils are equal, round, and reactive to light.   Pulmonary:      Effort: Pulmonary effort is normal.   Abdominal:          Comments: 4 surgical incisions to lower abdomen. Steri strips in place. No erythema or drainage noted.    Skin:     General: Skin is warm and dry.      Comments: There is no drainage or bleeding from the umbilicus. No surrounding erythema.   Neurological:      Mental Status: He is alert and oriented to person, place, and time.   Psychiatric:         Mood and Affect: Affect normal.          Result Review :  The following data was reviewed by: Indigo ZULUAGA" PILLO Hayward on 12/14/2023:            Lab Results (last 72 hours)       ** No results found for the last 72 hours. **                 Lab Results   Component Value Date    SARSANTIGEN Not Detected 12/23/2022    COVID19 Not Detected 11/10/2023    RAPFLUA Negative 12/23/2022    RAPFLUB Negative 12/23/2022    FLU Negative 12/23/2022    FLU Negative 12/23/2022    RSV Not Detected 11/10/2023    BILIRUBINUR Negative 11/10/2023       Procedures        Assessment and Plan   Diagnoses and all orders for this visit:    1. Umbilical bleeding (Primary)  Comments:  resolved              No bleeding or discharge noted on exam. Advised to keep f/u appt with surgery next week.     There are no discontinued medications.       Follow Up   No follow-ups on file.  Patient was given instructions and counseling regarding his condition or for health maintenance advice. Please see specific information pulled into the AVS if appropriate.       Indigo Hayward, PILLO  12/14/23  15:01 EST

## 2023-12-22 ENCOUNTER — OFFICE VISIT (OUTPATIENT)
Dept: SURGERY | Facility: CLINIC | Age: 33
End: 2023-12-22
Payer: OTHER GOVERNMENT

## 2023-12-22 VITALS
SYSTOLIC BLOOD PRESSURE: 133 MMHG | HEIGHT: 74 IN | DIASTOLIC BLOOD PRESSURE: 86 MMHG | BODY MASS INDEX: 40.43 KG/M2 | TEMPERATURE: 97.6 F | WEIGHT: 315 LBS

## 2023-12-22 DIAGNOSIS — K81.1 CHRONIC CHOLECYSTITIS: Primary | ICD-10-CM

## 2023-12-22 PROCEDURE — 99024 POSTOP FOLLOW-UP VISIT: CPT | Performed by: SURGERY

## 2023-12-22 NOTE — PROGRESS NOTES
Chief Complaint  Post-op    Subjective          Thai Ball presents to [facility]  History of Present Illness    Thai Ball is a 33 y.o. male  who presents today for a postoperative visit.     Patient is here for a follow-up after a robotic cholecystectomy for chronic cholecystitis.  He is still bit sore from the incisions and is having some loose stools if he eats anything greasy.  Otherwise he is doing okay.    Past History:  Medical History: has a past medical history of Chronic pain, Depression, Fibromyalgia, Kidney stone, Migraine, Nausea and vomiting (02/08/2023), and Vitamin D deficiency.   Surgical History: has a past surgical history that includes Neck surgery; Esophagogastroduodenoscopy (N/A, 5/25/2023); Colonoscopy (N/A, 5/25/2023); and Cholecystectomy (N/A, 12/6/2023).   Family History: family history is not on file.   Social History: reports that he has never smoked. He has never been exposed to tobacco smoke. His smokeless tobacco use includes chew. He reports current drug use. Drug: Marijuana. He reports that he does not drink alcohol.  Allergies: Compazine [prochlorperazine] and Reglan [metoclopramide]       Current Outpatient Medications:     albuterol sulfate  (90 Base) MCG/ACT inhaler, Inhale 2 puffs Every 6 (Six) Hours As Needed for Wheezing or Shortness of Air., Disp: 18 g, Rfl: 0    baclofen (LIORESAL) 10 MG tablet, Take 1 tablet by mouth 3 (Three) Times a Day., Disp: , Rfl:     celecoxib (CeleBREX) 100 MG capsule, Take 2 capsules by mouth 2 (Two) Times a Day As Needed for Mild Pain., Disp: , Rfl:     celecoxib (CeleBREX) 200 MG capsule, , Disp: , Rfl:     DULoxetine (CYMBALTA) 60 MG capsule, duloxetine 60 mg capsule,delayed release, Disp: , Rfl:     famotidine (PEPCID) 20 MG tablet, TAKE 1 TABLET BY MOUTH TWICE A DAY, Disp: 180 tablet, Rfl: 1    gabapentin (NEURONTIN) 300 MG capsule, , Disp: , Rfl:     gabapentin (NEURONTIN) 600 MG tablet,  Take 1 tablet by mouth 3 (Three) Times a Day., Disp: , Rfl:     galcanezumab-gnlm (Emgality) 120 MG/ML auto-injector pen, Emgality Pen 120 mg/mL subcutaneous pen injector  ADMINISTER 1 ML UNDER THE SKIN EVERY MONTH AS DIRECTED (90 day supply), Disp: , Rfl:     HYDROcodone-acetaminophen (NORCO) 5-325 MG per tablet, Take 1 tablet by mouth Every 6 (Six) Hours As Needed for Moderate Pain (Pain)., Disp: 10 tablet, Rfl: 0    hydrOXYzine (ATARAX) 25 MG tablet, Take 1 tablet by mouth Daily., Disp: , Rfl:     Hyoscyamine Sulfate SL (Levsin/SL) 0.125 MG sublingual tablet, Place 0.125 mg under the tongue 3 (Three) Times a Day As Needed (colon spasms)., Disp: 120 each, Rfl: 1    meloxicam (MOBIC) 7.5 MG tablet, , Disp: , Rfl:     mirtazapine (REMERON) 15 MG tablet, Take 1 tablet by mouth Every Night., Disp: , Rfl:     omeprazole (priLOSEC) 40 MG capsule, TAKE 1 CAPSULE BY MOUTH EVERY DAY, Disp: 90 capsule, Rfl: 1    ondansetron ODT (ZOFRAN-ODT) 4 MG disintegrating tablet, Place 1 tablet on the tongue Every 8 (Eight) Hours As Needed for Nausea or Vomiting., Disp: 15 tablet, Rfl: 0    Pain Reliever Plus 250-250-65 MG per tablet, , Disp: , Rfl:     pantoprazole (PROTONIX) 40 MG EC tablet, Take 1 tablet by mouth Daily., Disp: 30 tablet, Rfl: 5    tadalafil (CIALIS) 5 MG tablet, Take 1 tablet by mouth Daily As Needed for Erectile Dysfunction., Disp: 30 tablet, Rfl: 1    traMADol (ULTRAM) 50 MG tablet, Take 1 tablet by mouth Every 8 (Eight) Hours As Needed for Moderate Pain., Disp: 90 tablet, Rfl: 1    Ubrelvy 100 MG tablet, Take 1 tablet by mouth 1 (One) Time As Needed., Disp: , Rfl:     Semaglutide-Weight Management (Wegovy) 0.25 MG/0.5ML solution auto-injector, Inject 0.25 mg under the skin into the appropriate area as directed 1 (One) Time Per Week. (Patient not taking: Reported on 12/22/2023), Disp: 2 mL, Rfl: 0    Semaglutide-Weight Management (Wegovy) 0.5 MG/0.5ML solution auto-injector, Inject 0.5 mL under the skin into the  "appropriate area as directed 1 (One) Time Per Week. (Patient not taking: Reported on 12/22/2023), Disp: 2 mL, Rfl: 1    Semaglutide-Weight Management (Wegovy) 1 MG/0.5ML solution auto-injector, Inject 0.5 mL under the skin into the appropriate area as directed 1 (One) Time Per Week. (Patient not taking: Reported on 12/22/2023), Disp: 2 mL, Rfl: 1       Physical Exam  He appears well.  His incisions look good with no evidence of infection.  Objective     Vital Signs:   /86   Temp 97.6 °F (36.4 °C)   Ht 188 cm (74\")   Wt (!) 158 kg (349 lb)   BMI 44.81 kg/m²              Assessment and Plan    Diagnoses and all orders for this visit:    1. Chronic cholecystitis (Primary)    All in all he seems to be doing okay.  I told him he can get back to activity as tolerated.  I have asked him to try to moderate the fat in his diet for a few weeks until his stools become a little more regular.  We will see him back on an as-needed basis.      "

## 2023-12-22 NOTE — LETTER
December 22, 2023     PILLO Haines  [address] [facility] [address]    Patient: Thai Ball   YOB: 1990   Date of Visit: 12/22/2023     Dear PILLO Haines:       Thank you for referring Thai Ball to me for evaluation. Below are the relevant portions of my assessment and plan of care.    If you have questions, please do not hesitate to call me. I look forward to following Thai along with you.         Sincerely,        Iam Mensah MD        CC: No Recipients    Iam Mensah MD  12/22/23 0910  Sign when Signing Visit  Chief Complaint  Post-op    Subjective         Thai Ball presents to [facility]  History of Present Illness    Thai Ball is a 33 y.o. male  who presents today for a postoperative visit.     Patient is here for a follow-up after a robotic cholecystectomy for chronic cholecystitis.  He is still bit sore from the incisions and is having some loose stools if he eats anything greasy.  Otherwise he is doing okay.    Past History:  Medical History: has a past medical history of Chronic pain, Depression, Fibromyalgia, Kidney stone, Migraine, Nausea and vomiting (02/08/2023), and Vitamin D deficiency.   Surgical History: has a past surgical history that includes Neck surgery; Esophagogastroduodenoscopy (N/A, 5/25/2023); Colonoscopy (N/A, 5/25/2023); and Cholecystectomy (N/A, 12/6/2023).   Family History: family history is not on file.   Social History: reports that he has never smoked. He has never been exposed to tobacco smoke. His smokeless tobacco use includes chew. He reports current drug use. Drug: Marijuana. He reports that he does not drink alcohol.  Allergies: Compazine [prochlorperazine] and Reglan [metoclopramide]       Current Outpatient Medications:   •  albuterol sulfate  (90 Base) MCG/ACT inhaler, Inhale 2 puffs Every 6 (Six) Hours As Needed for Wheezing or Shortness  of Air., Disp: 18 g, Rfl: 0  •  baclofen (LIORESAL) 10 MG tablet, Take 1 tablet by mouth 3 (Three) Times a Day., Disp: , Rfl:   •  celecoxib (CeleBREX) 100 MG capsule, Take 2 capsules by mouth 2 (Two) Times a Day As Needed for Mild Pain., Disp: , Rfl:   •  celecoxib (CeleBREX) 200 MG capsule, , Disp: , Rfl:   •  DULoxetine (CYMBALTA) 60 MG capsule, duloxetine 60 mg capsule,delayed release, Disp: , Rfl:   •  famotidine (PEPCID) 20 MG tablet, TAKE 1 TABLET BY MOUTH TWICE A DAY, Disp: 180 tablet, Rfl: 1  •  gabapentin (NEURONTIN) 300 MG capsule, , Disp: , Rfl:   •  gabapentin (NEURONTIN) 600 MG tablet, Take 1 tablet by mouth 3 (Three) Times a Day., Disp: , Rfl:   •  galcanezumab-gnlm (Emgality) 120 MG/ML auto-injector pen, Emgality Pen 120 mg/mL subcutaneous pen injector  ADMINISTER 1 ML UNDER THE SKIN EVERY MONTH AS DIRECTED (90 day supply), Disp: , Rfl:   •  HYDROcodone-acetaminophen (NORCO) 5-325 MG per tablet, Take 1 tablet by mouth Every 6 (Six) Hours As Needed for Moderate Pain (Pain)., Disp: 10 tablet, Rfl: 0  •  hydrOXYzine (ATARAX) 25 MG tablet, Take 1 tablet by mouth Daily., Disp: , Rfl:   •  Hyoscyamine Sulfate SL (Levsin/SL) 0.125 MG sublingual tablet, Place 0.125 mg under the tongue 3 (Three) Times a Day As Needed (colon spasms)., Disp: 120 each, Rfl: 1  •  meloxicam (MOBIC) 7.5 MG tablet, , Disp: , Rfl:   •  mirtazapine (REMERON) 15 MG tablet, Take 1 tablet by mouth Every Night., Disp: , Rfl:   •  omeprazole (priLOSEC) 40 MG capsule, TAKE 1 CAPSULE BY MOUTH EVERY DAY, Disp: 90 capsule, Rfl: 1  •  ondansetron ODT (ZOFRAN-ODT) 4 MG disintegrating tablet, Place 1 tablet on the tongue Every 8 (Eight) Hours As Needed for Nausea or Vomiting., Disp: 15 tablet, Rfl: 0  •  Pain Reliever Plus 250-250-65 MG per tablet, , Disp: , Rfl:   •  pantoprazole (PROTONIX) 40 MG EC tablet, Take 1 tablet by mouth Daily., Disp: 30 tablet, Rfl: 5  •  tadalafil (CIALIS) 5 MG tablet, Take 1 tablet by mouth Daily As Needed for  "Erectile Dysfunction., Disp: 30 tablet, Rfl: 1  •  traMADol (ULTRAM) 50 MG tablet, Take 1 tablet by mouth Every 8 (Eight) Hours As Needed for Moderate Pain., Disp: 90 tablet, Rfl: 1  •  Ubrelvy 100 MG tablet, Take 1 tablet by mouth 1 (One) Time As Needed., Disp: , Rfl:   •  Semaglutide-Weight Management (Wegovy) 0.25 MG/0.5ML solution auto-injector, Inject 0.25 mg under the skin into the appropriate area as directed 1 (One) Time Per Week. (Patient not taking: Reported on 12/22/2023), Disp: 2 mL, Rfl: 0  •  Semaglutide-Weight Management (Wegovy) 0.5 MG/0.5ML solution auto-injector, Inject 0.5 mL under the skin into the appropriate area as directed 1 (One) Time Per Week. (Patient not taking: Reported on 12/22/2023), Disp: 2 mL, Rfl: 1  •  Semaglutide-Weight Management (Wegovy) 1 MG/0.5ML solution auto-injector, Inject 0.5 mL under the skin into the appropriate area as directed 1 (One) Time Per Week. (Patient not taking: Reported on 12/22/2023), Disp: 2 mL, Rfl: 1       Physical Exam  He appears well.  His incisions look good with no evidence of infection.  Objective    Vital Signs:   /86   Temp 97.6 °F (36.4 °C)   Ht 188 cm (74\")   Wt (!) 158 kg (349 lb)   BMI 44.81 kg/m²              Assessment and Plan    Diagnoses and all orders for this visit:    1. Chronic cholecystitis (Primary)    All in all he seems to be doing okay.  I told him he can get back to activity as tolerated.  I have asked him to try to moderate the fat in his diet for a few weeks until his stools become a little more regular.  We will see him back on an as-needed basis.      "

## 2023-12-23 PROCEDURE — 87070 CULTURE OTHR SPECIMN AEROBIC: CPT

## 2023-12-23 PROCEDURE — 87147 CULTURE TYPE IMMUNOLOGIC: CPT

## 2023-12-23 PROCEDURE — 87186 SC STD MICRODIL/AGAR DIL: CPT

## 2023-12-23 PROCEDURE — 87205 SMEAR GRAM STAIN: CPT

## 2023-12-27 ENCOUNTER — TELEPHONE (OUTPATIENT)
Dept: URGENT CARE | Facility: CLINIC | Age: 33
End: 2023-12-27
Payer: OTHER GOVERNMENT

## 2023-12-27 DIAGNOSIS — L08.9 SKIN INFECTION: Primary | ICD-10-CM

## 2023-12-27 RX ORDER — SULFAMETHOXAZOLE AND TRIMETHOPRIM 800; 160 MG/1; MG/1
1 TABLET ORAL 2 TIMES DAILY
Qty: 20 TABLET | Refills: 0 | Status: SHIPPED | OUTPATIENT
Start: 2023-12-27 | End: 2023-12-29

## 2023-12-27 NOTE — TELEPHONE ENCOUNTER
Called patient, he answered and confirmed his date of birth, relayed wound culture result, positive for Staphylococcus aureus. Per culture and sensitivity, susceptibility report, recommended Bactrim DS #1 p.o. twice daily 10-day course. Sent to [facility]. Recommended discontinuing cephalexin patient reports no further questions at this time, stated he should call us back if he has any, patient expressed understanding and agreement to all the above.     -John Cooksey, PA-C

## 2023-12-29 ENCOUNTER — OFFICE VISIT (OUTPATIENT)
Dept: FAMILY MEDICINE CLINIC | Facility: CLINIC | Age: 33
End: 2023-12-29
Payer: OTHER GOVERNMENT

## 2023-12-29 VITALS
HEART RATE: 100 BPM | OXYGEN SATURATION: 98 % | DIASTOLIC BLOOD PRESSURE: 125 MMHG | SYSTOLIC BLOOD PRESSURE: 154 MMHG | HEIGHT: 74 IN | WEIGHT: 315 LBS | TEMPERATURE: 98.7 F | BODY MASS INDEX: 40.43 KG/M2

## 2023-12-29 DIAGNOSIS — M25.552 BILATERAL HIP PAIN: ICD-10-CM

## 2023-12-29 DIAGNOSIS — L23.89 ALLERGIC CONTACT DERMATITIS DUE TO OTHER AGENTS: ICD-10-CM

## 2023-12-29 DIAGNOSIS — R19.8 UMBILICAL DISCHARGE: Primary | ICD-10-CM

## 2023-12-29 DIAGNOSIS — E66.01 CLASS 3 SEVERE OBESITY DUE TO EXCESS CALORIES WITH SERIOUS COMORBIDITY AND BODY MASS INDEX (BMI) OF 45.0 TO 49.9 IN ADULT: ICD-10-CM

## 2023-12-29 DIAGNOSIS — R03.0 ELEVATED BP WITHOUT DIAGNOSIS OF HYPERTENSION: ICD-10-CM

## 2023-12-29 DIAGNOSIS — M25.551 BILATERAL HIP PAIN: ICD-10-CM

## 2023-12-29 RX ORDER — TRAMADOL HYDROCHLORIDE 50 MG/1
50 TABLET ORAL EVERY 8 HOURS PRN
Qty: 270 TABLET | Refills: 1 | Status: SHIPPED | OUTPATIENT
Start: 2023-12-29

## 2023-12-29 RX ORDER — SACCHAROMYCES BOULARDII 250 MG
250 CAPSULE ORAL 2 TIMES DAILY
Qty: 60 CAPSULE | Refills: 5 | Status: SHIPPED | OUTPATIENT
Start: 2023-12-29

## 2023-12-29 RX ORDER — MUPIROCIN CALCIUM 20 MG/G
1 CREAM TOPICAL 3 TIMES DAILY
Qty: 30 G | Refills: 3 | Status: SHIPPED | OUTPATIENT
Start: 2023-12-29

## 2023-12-29 RX ORDER — TRIAMCINOLONE ACETONIDE 1 MG/G
1 CREAM TOPICAL 2 TIMES DAILY
Qty: 80 G | Refills: 1 | Status: SHIPPED | OUTPATIENT
Start: 2023-12-29

## 2023-12-29 RX ORDER — SULFAMETHOXAZOLE AND TRIMETHOPRIM 800; 160 MG/1; MG/1
1 TABLET ORAL 2 TIMES DAILY
Qty: 20 TABLET | Refills: 0 | Status: SHIPPED | OUTPATIENT
Start: 2023-12-29 | End: 2024-01-08

## 2023-12-29 RX ORDER — SEMAGLUTIDE 1 MG/.5ML
1 INJECTION, SOLUTION SUBCUTANEOUS WEEKLY
Qty: 2 ML | Refills: 1 | Status: SHIPPED | OUTPATIENT
Start: 2023-12-29

## 2023-12-29 RX ORDER — CLINDAMYCIN HYDROCHLORIDE 300 MG/1
300 CAPSULE ORAL 3 TIMES DAILY
Qty: 30 CAPSULE | Refills: 0 | Status: SHIPPED | OUTPATIENT
Start: 2023-12-29 | End: 2024-01-08

## 2023-12-29 NOTE — PROGRESS NOTES
Chief Complaint  ELAVTED LFTS (3MO F/U) and Med Refill    Subjective          Thai Ball is a 33 y.o. male who presents to [facility]    History of Present Illness    Continue with belly button pain, has gotten better while on antibiotic but soon as he goes off it around his belly button will get sore again.    PHQ-2 Total Score:     PHQ-9 Total Score:          Review of Systems   Constitutional:  Negative for chills, fatigue and fever.   Respiratory:  Negative for cough and shortness of breath.    Cardiovascular:  Negative for chest pain and palpitations.   Gastrointestinal:  Negative for constipation, diarrhea, nausea and vomiting.   Musculoskeletal:  Negative for back pain and neck pain.   Skin:  Negative for rash.        Continues with belly button pain at times   Neurological:  Negative for dizziness and headaches.          Medical History: has a past medical history of Chronic pain, Depression, Fibromyalgia, Kidney stone, Migraine, Nausea and vomiting (02/08/2023), and Vitamin D deficiency.     Surgical History: has a past surgical history that includes Neck surgery; Esophagogastroduodenoscopy (N/A, 5/25/2023); Colonoscopy (N/A, 5/25/2023); and Cholecystectomy (N/A, 12/6/2023).     Family History: family history is not on file.     Social History: reports that he has never smoked. He has never been exposed to tobacco smoke. He has quit using smokeless tobacco.  His smokeless tobacco use included chew. He reports current drug use. Drug: Marijuana. He reports that he does not drink alcohol.    Allergies: Compazine [prochlorperazine] and Reglan [metoclopramide]      Health Maintenance Due   Topic Date Due    ANNUAL PHYSICAL  Never done    INFLUENZA VACCINE  08/01/2023    COVID-19 Vaccine (3 - 2023-24 season) 09/01/2023            Current Outpatient Medications:     albuterol sulfate  (90 Base) MCG/ACT inhaler, Inhale 2 puffs Every 6 (Six) Hours As Needed for  Wheezing or Shortness of Air., Disp: 18 g, Rfl: 0    baclofen (LIORESAL) 10 MG tablet, Take 1 tablet by mouth 3 (Three) Times a Day., Disp: , Rfl:     celecoxib (CeleBREX) 100 MG capsule, Take 2 capsules by mouth 2 (Two) Times a Day As Needed for Mild Pain., Disp: , Rfl:     cephalexin (KEFLEX) 500 MG capsule, Take 1 capsule by mouth 2 (Two) Times a Day for 7 days., Disp: 14 capsule, Rfl: 0    DULoxetine (CYMBALTA) 60 MG capsule, duloxetine 60 mg capsule,delayed release, Disp: , Rfl:     famotidine (PEPCID) 20 MG tablet, TAKE 1 TABLET BY MOUTH TWICE A DAY, Disp: 180 tablet, Rfl: 1    gabapentin (NEURONTIN) 600 MG tablet, Take 1 tablet by mouth 3 (Three) Times a Day., Disp: , Rfl:     galcanezumab-gnlm (Emgality) 120 MG/ML auto-injector pen, Emgality Pen 120 mg/mL subcutaneous pen injector  ADMINISTER 1 ML UNDER THE SKIN EVERY MONTH AS DIRECTED (90 day supply), Disp: , Rfl:     hydrOXYzine (ATARAX) 25 MG tablet, Take 1 tablet by mouth Daily., Disp: , Rfl:     Hyoscyamine Sulfate SL (Levsin/SL) 0.125 MG sublingual tablet, Place 0.125 mg under the tongue 3 (Three) Times a Day As Needed (colon spasms)., Disp: 120 each, Rfl: 1    meloxicam (MOBIC) 7.5 MG tablet, , Disp: , Rfl:     mirtazapine (REMERON) 15 MG tablet, Take 1 tablet by mouth Every Night., Disp: , Rfl:     omeprazole (priLOSEC) 40 MG capsule, TAKE 1 CAPSULE BY MOUTH EVERY DAY, Disp: 90 capsule, Rfl: 1    Pain Reliever Plus 250-250-65 MG per tablet, , Disp: , Rfl:     pantoprazole (PROTONIX) 40 MG EC tablet, Take 1 tablet by mouth Daily., Disp: 30 tablet, Rfl: 5    Semaglutide-Weight Management (Wegovy) 1 MG/0.5ML solution auto-injector, Inject 0.5 mL under the skin into the appropriate area as directed 1 (One) Time Per Week., Disp: 2 mL, Rfl: 1    sucralfate (CARAFATE) 1 g tablet, Take 1 tablet by mouth 4 (Four) Times a Day., Disp: , Rfl:     tadalafil (CIALIS) 5 MG tablet, Take 1 tablet by mouth Daily As Needed for Erectile Dysfunction., Disp: 30 tablet,  "Rfl: 1    traMADol (ULTRAM) 50 MG tablet, Take 1 tablet by mouth Every 8 (Eight) Hours As Needed for Moderate Pain., Disp: 270 tablet, Rfl: 1    Ubrelvy 100 MG tablet, Take 1 tablet by mouth 1 (One) Time As Needed., Disp: , Rfl:     Blood Pressure Monitor device, Use 1 Device Daily., Disp: 1 each, Rfl: 0    clindamycin (CLEOCIN) 300 MG capsule, Take 1 capsule by mouth 3 (Three) Times a Day for 10 days., Disp: 30 capsule, Rfl: 0    mupirocin (BACTROBAN) 2 % cream, Apply 1 application  topically to the appropriate area as directed 3 (Three) Times a Day., Disp: 30 g, Rfl: 3    saccharomyces boulardii (Florastor) 250 MG capsule, Take 1 capsule by mouth 2 (Two) Times a Day., Disp: 60 capsule, Rfl: 5    sulfamethoxazole-trimethoprim (Bactrim DS) 800-160 MG per tablet, Take 1 tablet by mouth 2 (Two) Times a Day for 10 days., Disp: 20 tablet, Rfl: 0    triamcinolone (KENALOG) 0.1 % cream, Apply 1 application  topically to the appropriate area as directed 2 (Two) Times a Day., Disp: 80 g, Rfl: 1      Immunization History   Administered Date(s) Administered    COVID-19 (UNSPECIFIED) 05/25/2021, 06/15/2021    Fluzone (or Fluarix & Flulaval for VFC) >6mos 01/13/2023    TD Preservative Free (Tenivac) 01/13/2023         Objective       Vitals:    12/29/23 1357   BP: (!) 154/125   BP Location: Right arm   Patient Position: Sitting   Cuff Size: Large Adult   Pulse: 100   Temp: 98.7 °F (37.1 °C)   TempSrc: Temporal   SpO2: 98%   Weight: (!) 158 kg (347 lb 12.8 oz)   Height: 188 cm (74\")   PainSc:   6      Body mass index is 44.65 kg/m².   Wt Readings from Last 3 Encounters:   12/29/23 (!) 158 kg (347 lb 12.8 oz)   12/23/23 (!) 158 kg (349 lb 3.2 oz)   12/22/23 (!) 158 kg (349 lb)      BP Readings from Last 3 Encounters:   12/29/23 (!) 154/125   12/23/23 126/86   12/22/23 133/86                Physical Exam  Vitals reviewed.   Constitutional:       Appearance: Normal appearance.   HENT:      Head: Normocephalic and atraumatic. "   Cardiovascular:      Rate and Rhythm: Normal rate and regular rhythm.      Pulses: Normal pulses.      Heart sounds: Normal heart sounds.   Pulmonary:      Effort: Pulmonary effort is normal.      Breath sounds: Normal breath sounds.   Skin:     General: Skin is warm and dry.      Comments: Raised macular rash on blue of tatoo   Neurological:      Mental Status: He is alert and oriented to person, place, and time.   Psychiatric:         Mood and Affect: Mood normal.         Behavior: Behavior normal.         Thought Content: Thought content normal.         Judgment: Judgment normal.             Result Review :       Common labs          3/30/2023    11:27 9/29/2023    11:52 11/10/2023    11:04   Common Labs   Glucose 101  100  114    BUN 18  14  14    Creatinine 0.76  0.67  0.84    Sodium 140  138  138    Potassium 4.2  4.5  4.2    Chloride 104  106  104    Calcium 9.0  9.1  9.0    Albumin 4.4  4.4  4.3    Total Bilirubin 0.9  0.8  0.7    Alkaline Phosphatase 86  98  117    AST (SGOT) 38  52  37    ALT (SGPT) 60  89  74    WBC 8.14  10.57  12.99    Hemoglobin 15.6  16.0  16.5    Hematocrit 44.5  47.1  48.2    Platelets 241  293  364    Hemoglobin A1C  5.80                        Assessment and Plan        Diagnoses and all orders for this visit:    1. Umbilical discharge (Primary)  -     sulfamethoxazole-trimethoprim (Bactrim DS) 800-160 MG per tablet; Take 1 tablet by mouth 2 (Two) Times a Day for 10 days.  Dispense: 20 tablet; Refill: 0  -     clindamycin (CLEOCIN) 300 MG capsule; Take 1 capsule by mouth 3 (Three) Times a Day for 10 days.  Dispense: 30 capsule; Refill: 0  -     saccharomyces boulardii (Florastor) 250 MG capsule; Take 1 capsule by mouth 2 (Two) Times a Day.  Dispense: 60 capsule; Refill: 5  -     mupirocin (BACTROBAN) 2 % cream; Apply 1 application  topically to the appropriate area as directed 3 (Three) Times a Day.  Dispense: 30 g; Refill: 3    2. Class 3 severe obesity due to excess calories  with serious comorbidity and body mass index (BMI) of 45.0 to 49.9 in adult  Comments:  Has tried and failed phentermine with topamax, wellbutrin, contrave is contraindicated because of other medications he is taking.  Orders:  -     Semaglutide-Weight Management (Wegovy) 1 MG/0.5ML solution auto-injector; Inject 0.5 mL under the skin into the appropriate area as directed 1 (One) Time Per Week.  Dispense: 2 mL; Refill: 1    3. Bilateral hip pain  Comments:  GUI reviewed and UDS updated.  Orders:  -     traMADol (ULTRAM) 50 MG tablet; Take 1 tablet by mouth Every 8 (Eight) Hours As Needed for Moderate Pain.  Dispense: 270 tablet; Refill: 1    4. Allergic contact dermatitis due to other agents  -     triamcinolone (KENALOG) 0.1 % cream; Apply 1 application  topically to the appropriate area as directed 2 (Two) Times a Day.  Dispense: 80 g; Refill: 1    5. Elevated BP without diagnosis of hypertension  Comments:  Rechecked /102  Assessment & Plan:  Patient advised to monitor blood pressure at home and journal readings.  Patient informed that a blood pressure reading at home of more than 130/80 is considered hypertension.  For readings greater than 140/90 or higher patient is advised to follow-up in the office with readings for management.  Patient advised to limit sodium intake.      Orders:  -     Blood Pressure Monitor device; Use 1 Device Daily.  Dispense: 1 each; Refill: 0          Follow Up     Return in about 6 months (around 6/29/2024).    Patient was given instructions and counseling regarding his condition or for health maintenance advice. Please see specific information pulled into the AVS if appropriate.     PILLO Haines

## 2024-01-03 ENCOUNTER — PATIENT MESSAGE (OUTPATIENT)
Dept: FAMILY MEDICINE CLINIC | Facility: CLINIC | Age: 34
End: 2024-01-03
Payer: OTHER GOVERNMENT

## 2024-01-03 RX ORDER — ONDANSETRON 4 MG/1
4 TABLET, ORALLY DISINTEGRATING ORAL EVERY 8 HOURS PRN
Qty: 30 TABLET | Refills: 1 | Status: SHIPPED | OUTPATIENT
Start: 2024-01-03

## 2024-01-04 ENCOUNTER — HOSPITAL ENCOUNTER (EMERGENCY)
Facility: HOSPITAL | Age: 34
Discharge: HOME | End: 2024-01-04
Attending: EMERGENCY MEDICINE
Payer: OTHER GOVERNMENT

## 2024-01-04 ENCOUNTER — APPOINTMENT (OUTPATIENT)
Dept: CT IMAGING | Facility: HOSPITAL | Age: 34
End: 2024-01-04
Payer: OTHER GOVERNMENT

## 2024-01-04 VITALS
SYSTOLIC BLOOD PRESSURE: 130 MMHG | TEMPERATURE: 97.5 F | OXYGEN SATURATION: 98 % | BODY MASS INDEX: 40.43 KG/M2 | WEIGHT: 315 LBS | RESPIRATION RATE: 18 BRPM | DIASTOLIC BLOOD PRESSURE: 78 MMHG | HEART RATE: 92 BPM | HEIGHT: 74 IN

## 2024-01-04 DIAGNOSIS — R11.2 NAUSEA AND VOMITING, UNSPECIFIED VOMITING TYPE: Primary | ICD-10-CM

## 2024-01-04 DIAGNOSIS — T50.905A ADVERSE EFFECT OF DRUG, INITIAL ENCOUNTER: ICD-10-CM

## 2024-01-04 LAB
ALBUMIN SERPL-MCNC: 4.4 G/DL (ref 3.5–5.2)
ALBUMIN/GLOB SERPL: 1.3 G/DL
ALP SERPL-CCNC: 97 U/L (ref 39–117)
ALT SERPL W P-5'-P-CCNC: 107 U/L (ref 1–41)
ANION GAP SERPL CALCULATED.3IONS-SCNC: 16.5 MMOL/L (ref 5–15)
AST SERPL-CCNC: 51 U/L (ref 1–40)
BACTERIA UR QL AUTO: NORMAL /HPF
BASOPHILS # BLD AUTO: 0.11 10*3/MM3 (ref 0–0.2)
BASOPHILS NFR BLD AUTO: 0.9 % (ref 0–1.5)
BILIRUB SERPL-MCNC: 1.7 MG/DL (ref 0–1.2)
BILIRUB UR QL STRIP: ABNORMAL
BUN SERPL-MCNC: 17 MG/DL (ref 6–20)
BUN/CREAT SERPL: 18.1 (ref 7–25)
CALCIUM SPEC-SCNC: 9.7 MG/DL (ref 8.6–10.5)
CHLORIDE SERPL-SCNC: 101 MMOL/L (ref 98–107)
CLARITY UR: ABNORMAL
CO2 SERPL-SCNC: 20.5 MMOL/L (ref 22–29)
COLOR UR: ABNORMAL
CREAT SERPL-MCNC: 0.94 MG/DL (ref 0.76–1.27)
DEPRECATED RDW RBC AUTO: 35.2 FL (ref 37–54)
EGFRCR SERPLBLD CKD-EPI 2021: 111.1 ML/MIN/1.73
EOSINOPHIL # BLD AUTO: 0.38 10*3/MM3 (ref 0–0.4)
EOSINOPHIL NFR BLD AUTO: 3.1 % (ref 0.3–6.2)
ERYTHROCYTE [DISTWIDTH] IN BLOOD BY AUTOMATED COUNT: 11.8 % (ref 12.3–15.4)
FLUAV SUBTYP SPEC NAA+PROBE: NOT DETECTED
FLUBV RNA ISLT QL NAA+PROBE: NOT DETECTED
GLOBULIN UR ELPH-MCNC: 3.4 GM/DL
GLUCOSE SERPL-MCNC: 123 MG/DL (ref 65–99)
GLUCOSE UR STRIP-MCNC: NEGATIVE MG/DL
HCT VFR BLD AUTO: 49.5 % (ref 37.5–51)
HGB BLD-MCNC: 16.7 G/DL (ref 13–17.7)
HGB UR QL STRIP.AUTO: NEGATIVE
HOLD SPECIMEN: NORMAL
HOLD SPECIMEN: NORMAL
HYALINE CASTS UR QL AUTO: NORMAL /LPF
IMM GRANULOCYTES # BLD AUTO: 0.06 10*3/MM3 (ref 0–0.05)
IMM GRANULOCYTES NFR BLD AUTO: 0.5 % (ref 0–0.5)
KETONES UR QL STRIP: ABNORMAL
LEUKOCYTE ESTERASE UR QL STRIP.AUTO: ABNORMAL
LIPASE SERPL-CCNC: 13 U/L (ref 13–60)
LYMPHOCYTES # BLD AUTO: 2.26 10*3/MM3 (ref 0.7–3.1)
LYMPHOCYTES NFR BLD AUTO: 18.5 % (ref 19.6–45.3)
MCH RBC QN AUTO: 27.9 PG (ref 26.6–33)
MCHC RBC AUTO-ENTMCNC: 33.7 G/DL (ref 31.5–35.7)
MCV RBC AUTO: 82.8 FL (ref 79–97)
MONOCYTES # BLD AUTO: 0.58 10*3/MM3 (ref 0.1–0.9)
MONOCYTES NFR BLD AUTO: 4.8 % (ref 5–12)
NEUTROPHILS NFR BLD AUTO: 72.2 % (ref 42.7–76)
NEUTROPHILS NFR BLD AUTO: 8.81 10*3/MM3 (ref 1.7–7)
NITRITE UR QL STRIP: NEGATIVE
NRBC BLD AUTO-RTO: 0 /100 WBC (ref 0–0.2)
PH UR STRIP.AUTO: 5.5 [PH] (ref 5–8)
PLATELET # BLD AUTO: 356 10*3/MM3 (ref 140–450)
PMV BLD AUTO: 11.4 FL (ref 6–12)
POTASSIUM SERPL-SCNC: 4.1 MMOL/L (ref 3.5–5.2)
PROT SERPL-MCNC: 7.8 G/DL (ref 6–8.5)
PROT UR QL STRIP: ABNORMAL
RBC # BLD AUTO: 5.98 10*6/MM3 (ref 4.14–5.8)
RBC # UR STRIP: NORMAL /HPF
REF LAB TEST METHOD: NORMAL
RSV RNA NPH QL NAA+NON-PROBE: NOT DETECTED
SARS-COV-2 RNA RESP QL NAA+PROBE: NOT DETECTED
SODIUM SERPL-SCNC: 138 MMOL/L (ref 136–145)
SP GR UR STRIP: >1.03 (ref 1–1.03)
SQUAMOUS #/AREA URNS HPF: NORMAL /HPF
UROBILINOGEN UR QL STRIP: ABNORMAL
WBC # UR STRIP: NORMAL /HPF
WBC NRBC COR # BLD AUTO: 12.2 10*3/MM3 (ref 3.4–10.8)
WHOLE BLOOD HOLD COAG: NORMAL
WHOLE BLOOD HOLD SPECIMEN: NORMAL

## 2024-01-04 PROCEDURE — 99285 EMERGENCY DEPT VISIT HI MDM: CPT

## 2024-01-04 PROCEDURE — 25010000002 LORAZEPAM PER 2 MG: Performed by: EMERGENCY MEDICINE

## 2024-01-04 PROCEDURE — 36415 COLL VENOUS BLD VENIPUNCTURE: CPT

## 2024-01-04 PROCEDURE — 96374 THER/PROPH/DIAG INJ IV PUSH: CPT

## 2024-01-04 PROCEDURE — 25510000001 IOPAMIDOL PER 1 ML: Performed by: EMERGENCY MEDICINE

## 2024-01-04 PROCEDURE — 85025 COMPLETE CBC W/AUTO DIFF WBC: CPT

## 2024-01-04 PROCEDURE — 87637 SARSCOV2&INF A&B&RSV AMP PRB: CPT

## 2024-01-04 PROCEDURE — 74177 CT ABD & PELVIS W/CONTRAST: CPT

## 2024-01-04 PROCEDURE — 80053 COMPREHEN METABOLIC PANEL: CPT

## 2024-01-04 PROCEDURE — 81001 URINALYSIS AUTO W/SCOPE: CPT | Performed by: EMERGENCY MEDICINE

## 2024-01-04 PROCEDURE — 25810000003 SODIUM CHLORIDE 0.9 % SOLUTION: Performed by: EMERGENCY MEDICINE

## 2024-01-04 PROCEDURE — 96375 TX/PRO/DX INJ NEW DRUG ADDON: CPT

## 2024-01-04 PROCEDURE — 83690 ASSAY OF LIPASE: CPT

## 2024-01-04 PROCEDURE — 25010000002 ONDANSETRON PER 1 MG: Performed by: EMERGENCY MEDICINE

## 2024-01-04 RX ORDER — LORAZEPAM 2 MG/ML
1 INJECTION INTRAMUSCULAR ONCE
Status: COMPLETED | OUTPATIENT
Start: 2024-01-04 | End: 2024-01-04

## 2024-01-04 RX ORDER — ONDANSETRON 2 MG/ML
4 INJECTION INTRAMUSCULAR; INTRAVENOUS ONCE
Status: COMPLETED | OUTPATIENT
Start: 2024-01-04 | End: 2024-01-04

## 2024-01-04 RX ORDER — SODIUM CHLORIDE 0.9 % (FLUSH) 0.9 %
10 SYRINGE (ML) INJECTION AS NEEDED
Status: DISCONTINUED | OUTPATIENT
Start: 2024-01-04 | End: 2024-01-04 | Stop reason: HOSPADM

## 2024-01-04 RX ADMIN — ONDANSETRON 4 MG: 2 INJECTION INTRAMUSCULAR; INTRAVENOUS at 10:43

## 2024-01-04 RX ADMIN — LORAZEPAM 1 MG: 2 INJECTION INTRAMUSCULAR; INTRAVENOUS at 10:43

## 2024-01-04 RX ADMIN — SODIUM CHLORIDE 1000 ML: 9 INJECTION, SOLUTION INTRAVENOUS at 10:41

## 2024-01-04 RX ADMIN — IOPAMIDOL 100 ML: 755 INJECTION, SOLUTION INTRAVENOUS at 11:02

## 2024-01-04 NOTE — ED NOTES
Pt pacing back and forth asking for something for his nerves, he states he has been so nauseated he has not been able to hold any medications down.

## 2024-01-04 NOTE — ED PROVIDER NOTES
"Patient is 33 y.o. year old male that presents to the ED for evaluation of vomiting with nausea for 2 days now.  Patient recently started Wegovy and symptoms have started since that time    Physical Exam    ED Course:    /86 (BP Location: Left arm, Patient Position: Sitting)   Pulse 98   Temp 97.9 °F (36.6 °C) (Oral)   Resp 16   Ht 188 cm (74\")   Wt (!) 155 kg (342 lb 13 oz)   SpO2 97%   BMI 44.01 kg/m²   Results for orders placed or performed during the hospital encounter of 01/04/24   COVID-19, FLU A/B, RSV PCR 1 HR TAT - Swab, Nasopharynx    Specimen: Nasopharynx; Swab   Result Value Ref Range    COVID19 Not Detected Not Detected - Ref. Range    Influenza A PCR Not Detected Not Detected    Influenza B PCR Not Detected Not Detected    RSV, PCR Not Detected Not Detected   Comprehensive Metabolic Panel    Specimen: Arm, Right; Blood   Result Value Ref Range    Glucose 123 (H) 65 - 99 mg/dL    BUN 17 6 - 20 mg/dL    Creatinine 0.94 0.76 - 1.27 mg/dL    Sodium 138 136 - 145 mmol/L    Potassium 4.1 3.5 - 5.2 mmol/L    Chloride 101 98 - 107 mmol/L    CO2 20.5 (L) 22.0 - 29.0 mmol/L    Calcium 9.7 8.6 - 10.5 mg/dL    Total Protein 7.8 6.0 - 8.5 g/dL    Albumin 4.4 3.5 - 5.2 g/dL    ALT (SGPT) 107 (H) 1 - 41 U/L    AST (SGOT) 51 (H) 1 - 40 U/L    Alkaline Phosphatase 97 39 - 117 U/L    Total Bilirubin 1.7 (H) 0.0 - 1.2 mg/dL    Globulin 3.4 gm/dL    A/G Ratio 1.3 g/dL    BUN/Creatinine Ratio 18.1 7.0 - 25.0    Anion Gap 16.5 (H) 5.0 - 15.0 mmol/L    eGFR 111.1 >60.0 mL/min/1.73   Lipase    Specimen: Arm, Right; Blood   Result Value Ref Range    Lipase 13 13 - 60 U/L   Urinalysis With Microscopic If Indicated (No Culture) - Urine, Clean Catch    Specimen: Urine, Clean Catch   Result Value Ref Range    Color, UA Dark Yellow (A) Yellow, Straw    Appearance, UA Cloudy (A) Clear    pH, UA 5.5 5.0 - 8.0    Specific Gravity, UA >1.030 (H) 1.005 - 1.030    Glucose, UA Negative Negative    Ketones, UA 15 mg/dL (1+) " (A) Negative    Bilirubin, UA Moderate (2+) (A) Negative    Blood, UA Negative Negative    Protein, UA 30 mg/dL (1+) (A) Negative    Leuk Esterase, UA Trace (A) Negative    Nitrite, UA Negative Negative    Urobilinogen, UA 1.0 E.U./dL 0.2 - 1.0 E.U./dL   CBC Auto Differential    Specimen: Arm, Right; Blood   Result Value Ref Range    WBC 12.20 (H) 3.40 - 10.80 10*3/mm3    RBC 5.98 (H) 4.14 - 5.80 10*6/mm3    Hemoglobin 16.7 13.0 - 17.7 g/dL    Hematocrit 49.5 37.5 - 51.0 %    MCV 82.8 79.0 - 97.0 fL    MCH 27.9 26.6 - 33.0 pg    MCHC 33.7 31.5 - 35.7 g/dL    RDW 11.8 (L) 12.3 - 15.4 %    RDW-SD 35.2 (L) 37.0 - 54.0 fl    MPV 11.4 6.0 - 12.0 fL    Platelets 356 140 - 450 10*3/mm3    Neutrophil % 72.2 42.7 - 76.0 %    Lymphocyte % 18.5 (L) 19.6 - 45.3 %    Monocyte % 4.8 (L) 5.0 - 12.0 %    Eosinophil % 3.1 0.3 - 6.2 %    Basophil % 0.9 0.0 - 1.5 %    Immature Grans % 0.5 0.0 - 0.5 %    Neutrophils, Absolute 8.81 (H) 1.70 - 7.00 10*3/mm3    Lymphocytes, Absolute 2.26 0.70 - 3.10 10*3/mm3    Monocytes, Absolute 0.58 0.10 - 0.90 10*3/mm3    Eosinophils, Absolute 0.38 0.00 - 0.40 10*3/mm3    Basophils, Absolute 0.11 0.00 - 0.20 10*3/mm3    Immature Grans, Absolute 0.06 (H) 0.00 - 0.05 10*3/mm3    nRBC 0.0 0.0 - 0.2 /100 WBC   Urinalysis, Microscopic Only - Urine, Clean Catch    Specimen: Urine, Clean Catch   Result Value Ref Range    RBC, UA 0-2 None Seen, 0-2 /HPF    WBC, UA 0-2 None Seen, 0-2 /HPF    Bacteria, UA None Seen None Seen /HPF    Squamous Epithelial Cells, UA 0-2 None Seen, 0-2 /HPF    Hyaline Casts, UA 3-6 None Seen /LPF    Methodology Automated Microscopy    Green Top (Gel)   Result Value Ref Range    Extra Tube Hold for add-ons.    Lavender Top   Result Value Ref Range    Extra Tube hold for add-on    Gold Top - SST   Result Value Ref Range    Extra Tube Hold for add-ons.    Light Blue Top   Result Value Ref Range    Extra Tube Hold for add-ons.      Medications   sodium chloride 0.9 % flush 10 mL (has no  administration in time range)   sodium chloride 0.9 % bolus 1,000 mL (0 mL Intravenous Stopped 1/4/24 1152)   ondansetron (ZOFRAN) injection 4 mg (4 mg Intravenous Given 1/4/24 1043)   LORazepam (ATIVAN) injection 1 mg (1 mg Intravenous Given 1/4/24 1043)   iopamidol (ISOVUE-370) 76 % injection 100 mL (100 mL Intravenous Given 1/4/24 1102)     CT Abdomen Pelvis With Contrast    Result Date: 1/4/2024  Narrative: PROCEDURE: CT ABDOMEN PELVIS W CONTRAST  COMPARISON: [facility], CT, CT ABDOMEN PELVIS W CONTRAST, 2/03/2023, 12:13.  INDICATIONS: GENERALIZED ABDOMEN PAIN WITH VOMITTING  TECHNIQUE: After obtaining the patient's consent, CT images were created with non-ionic intravenous contrast material.   PROTOCOL:   Standard imaging protocol performed    RADIATION:   DLP: 1887mGy*cm   Automated exposure control was utilized to minimize radiation dose. CONTRAST: 100cc Isovue 370 I.V.  FINDINGS:    Lung bases:  Limited imaging lung bases is grossly clear.  No free air noted below the diaphragm.  Organs:  The gallbladder is not visualized.  The liver is diffusely decreased in attenuation compatible with hepatic steatosis.  The spleen, kidneys, pancreas and adrenal glands are grossly unremarkable in appearance  GI tract:  The stomach and the GI tract demonstrate no acute abnormality.  Lymph nodes within the mesentery are noted which are likely reactive in nature.  Appendix appears be grossly unremarkable on coronal imaging..  Pelvis:  Bladder is decompressed and grossly unremarkable in appearance.  No suspicious pelvic adenopathy is or fluid collection noted.  This  Retroperitoneum:  The aorta is normal in caliber.  No suspicious retroperitoneal adenopathy noted.  Bones and soft tissues:  No acute osseous abnormality      Impression:   1. Diffuse hepatic steatosis.  Please correlate with liver function tests    LAUREN TORRES MD       Electronically Signed and Approved By: LAUREN TORRES MD on 1/04/2024 at  11:30              MDM: This is a 33-year-old male who presents for nausea and vomiting while currently taking Wegovy.  CBC independently reviewed by me and shows no critical abnormalities.  CMP independently reviewed by me and shows no critical abnormalities except for signs consistent with vomiting and dehydration, hepatic steatosis.    Procedures          This visit was performed by both myself and an APC.  The substantive portion of the medical decision making was performed by this me and I made or approved the management plan and take responsibility for patient management.  All study impressions documented in this and in the APC note (if performed) were independently interpreted by me.      Ari Reed MD  12:22 EST  01/04/24         Ari Reed MD  01/04/24 2999

## 2024-01-04 NOTE — DISCHARGE INSTRUCTIONS
Discussed with your primary care provider regarding Wegovy.  You may have to switch to a different medication or start this medication on the lowest dose, but there is no guarantee that you may not still have the nausea and vomiting.  Ensure that you are drinking plenty of fluids at home to stay well-hydrated.
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
## 2024-01-12 ENCOUNTER — CLINICAL SUPPORT (OUTPATIENT)
Dept: FAMILY MEDICINE CLINIC | Facility: CLINIC | Age: 34
End: 2024-01-12
Payer: OTHER GOVERNMENT

## 2024-01-12 ENCOUNTER — TELEPHONE (OUTPATIENT)
Dept: FAMILY MEDICINE CLINIC | Facility: CLINIC | Age: 34
End: 2024-01-12
Payer: OTHER GOVERNMENT

## 2024-01-12 DIAGNOSIS — R19.8 UMBILICAL DISCHARGE: ICD-10-CM

## 2024-01-12 DIAGNOSIS — R19.8 UMBILICAL DISCHARGE: Primary | ICD-10-CM

## 2024-01-12 PROCEDURE — 87205 SMEAR GRAM STAIN: CPT | Performed by: NURSE PRACTITIONER

## 2024-01-12 PROCEDURE — 87070 CULTURE OTHR SPECIMN AEROBIC: CPT | Performed by: NURSE PRACTITIONER

## 2024-01-12 NOTE — TELEPHONE ENCOUNTER
Provider: AARON LERMA    Caller: LOAN NGUYEN    Relationship to Patient: SELF    Pharmacy: [facility]/pharmacy #[id number] - [place], [place] - [address] - [phone]  - [phone] FX     Phone Number: [phone]     Reason for Call: PATIENT STATES HE HAS 2 DAYS LEFT OF ANTIBIOTICS AND THE INFECTION HASN'T GOTTEN ANY BETTER.    WOULD LIKE FOR AARON TO CALL HIM BACK    When was the patient last seen: 12/29/23

## 2024-01-15 LAB
BACTERIA SPEC AEROBE CULT: NORMAL
GRAM STN SPEC: NORMAL

## 2024-02-01 ENCOUNTER — PATIENT MESSAGE (OUTPATIENT)
Dept: FAMILY MEDICINE CLINIC | Facility: CLINIC | Age: 34
End: 2024-02-01
Payer: OTHER GOVERNMENT

## 2024-02-01 ENCOUNTER — OFFICE VISIT (OUTPATIENT)
Dept: FAMILY MEDICINE CLINIC | Facility: CLINIC | Age: 34
End: 2024-02-01
Payer: OTHER GOVERNMENT

## 2024-02-01 VITALS
DIASTOLIC BLOOD PRESSURE: 86 MMHG | SYSTOLIC BLOOD PRESSURE: 127 MMHG | BODY MASS INDEX: 40.43 KG/M2 | HEIGHT: 74 IN | WEIGHT: 315 LBS | TEMPERATURE: 97.8 F | OXYGEN SATURATION: 98 % | HEART RATE: 96 BPM

## 2024-02-01 DIAGNOSIS — J98.8 RESPIRATORY INFECTION: Primary | ICD-10-CM

## 2024-02-01 LAB
EXPIRATION DATE: NORMAL
FLUAV AG UPPER RESP QL IA.RAPID: NOT DETECTED
FLUBV AG UPPER RESP QL IA.RAPID: NOT DETECTED
INTERNAL CONTROL: NORMAL
Lab: NORMAL
SARS-COV-2 AG UPPER RESP QL IA.RAPID: NOT DETECTED

## 2024-02-01 RX ORDER — AMOXICILLIN 875 MG/1
875 TABLET, COATED ORAL 2 TIMES DAILY
Qty: 14 TABLET | Refills: 0 | Status: SHIPPED | OUTPATIENT
Start: 2024-02-01 | End: 2024-02-01 | Stop reason: SDUPTHER

## 2024-02-01 RX ORDER — AMOXICILLIN 875 MG/1
875 TABLET, COATED ORAL 2 TIMES DAILY
Qty: 14 TABLET | Refills: 0 | Status: SHIPPED | OUTPATIENT
Start: 2024-02-01 | End: 2024-02-05

## 2024-02-01 NOTE — PROGRESS NOTES
"Chief Complaint  Cough (X 1 week) and Nasal Congestion (X 1 week)    Subjective      History of Present Illness  Thai Ball is a 33 y.o. male who presents to [facility] with a past medical history of  Past Medical History:   Diagnosis Date    Chronic pain     Depression     Fibromyalgia     Kidney stone     Migraine     Nausea and vomiting 02/08/2023    Vitamin D deficiency      Same day visit:     Patient comes with Cough (X 1 week) and Nasal Congestion (X 1 week).     Test for covid, flu in house negative.     Will treat empirically with amoxicillin and patient will follow on Monday with primary care.       Objective   Vital Signs:   Vitals:    02/01/24 1405   BP: 127/86   Pulse: 96   Temp: 97.8 °F (36.6 °C)   SpO2: 98%   Weight: (!) 156 kg (344 lb)   Height: 188 cm (74\")     Body mass index is 44.17 kg/m².    Wt Readings from Last 3 Encounters:   02/01/24 (!) 156 kg (344 lb)   01/04/24 (!) 155 kg (342 lb 13 oz)   12/29/23 (!) 158 kg (347 lb 12.8 oz)     BP Readings from Last 3 Encounters:   02/01/24 127/86   01/04/24 130/78   12/29/23 (!) 154/125       Health Maintenance   Topic Date Due    ANNUAL PHYSICAL  Never done    INFLUENZA VACCINE  08/01/2023    COVID-19 Vaccine (3 - 2023-24 season) 09/01/2023    BMI FOLLOWUP  12/29/2024    TDAP/TD VACCINES (2 - Tdap) 01/13/2033    HEPATITIS C SCREENING  Completed    Pneumococcal Vaccine 0-64  Aged Out       Physical Exam  Cardiovascular:      Rate and Rhythm: Normal rate.   Pulmonary:      Effort: Pulmonary effort is normal.   Abdominal:      General: Abdomen is flat.   Skin:     General: Skin is warm.      Capillary Refill: Capillary refill takes less than 2 seconds.   Neurological:      General: No focal deficit present.      Mental Status: He is alert and oriented to person, place, and time.            Result Review :  The following data was reviewed by: Kenny Bender MD on 02/01/2024:             Assessment and " Plan   Diagnoses and all orders for this visit:    1. Respiratory infection (Primary)  -     amoxicillin (AMOXIL) 875 MG tablet; Take 1 tablet by mouth 2 (Two) Times a Day for 7 days.  Dispense: 14 tablet; Refill: 0  -     Cancel: COVID-19 and FLU A/B PCR, 1 HR TAT - Swab, Nasopharynx; Future  -     POCT SARS-CoV-2 Antigen BRIAN + Flu    Other orders  -     Discontinue: amoxicillin (AMOXIL) 875 MG tablet; Take 1 tablet by mouth 2 (Two) Times a Day for 7 days.  Dispense: 14 tablet; Refill: 0                  FOLLOW UP  No follow-ups on file.  Patient was given instructions and counseling regarding his condition or for health maintenance advice. Please see specific information pulled into the AVS if appropriate.       Kenny Bender MD  02/01/24  14:34 EST    CURRENT & DISCONTINUED MEDICATIONS  Current Outpatient Medications   Medication Instructions    albuterol sulfate  (90 Base) MCG/ACT inhaler 2 puffs, Inhalation, Every 6 Hours PRN    amoxicillin (AMOXIL) 875 mg, Oral, 2 Times Daily    baclofen (LIORESAL) 10 mg, Oral, 3 Times Daily    Blood Pressure Monitor device 1 Device, Does not apply, Daily    celecoxib (CELEBREX) 200 mg, Oral, 2 Times Daily PRN    DULoxetine (CYMBALTA) 60 MG capsule duloxetine 60 mg capsule,delayed release    famotidine (PEPCID) 20 MG tablet TAKE 1 TABLET BY MOUTH TWICE A DAY    gabapentin (NEURONTIN) 600 mg, Oral, 3 Times Daily    galcanezumab-gnlm (Emgality) 120 MG/ML auto-injector pen Emgality Pen 120 mg/mL subcutaneous pen injector   ADMINISTER 1 ML UNDER THE SKIN EVERY MONTH AS DIRECTED (90 day supply)    hydrOXYzine (ATARAX) 25 MG tablet Take 1 tablet by mouth Daily.    Hyoscyamine Sulfate SL (LEVSIN/SL) 0.125 mg, Sublingual, 3 Times Daily PRN    meloxicam (MOBIC) 7.5 MG tablet     mirtazapine (REMERON) 15 MG tablet Take 1 tablet by mouth Every Night.    mupirocin (BACTROBAN) 2 % cream 1 application , Topical, 3 Times Daily    omeprazole (priLOSEC) 40 MG capsule TAKE  1 CAPSULE BY MOUTH EVERY DAY    ondansetron ODT (ZOFRAN-ODT) 4 mg, Translingual, Every 8 Hours PRN    Pain Reliever Plus 250-250-65 MG per tablet No dose, route, or frequency recorded.    pantoprazole (PROTONIX) 40 mg, Oral, Daily    saccharomyces boulardii (FLORASTOR) 250 mg, Oral, 2 Times Daily    sucralfate (CARAFATE) 1 g tablet 1 tablet, Oral, 4 Times Daily    tadalafil (CIALIS) 5 mg, Oral, Daily PRN    traMADol (ULTRAM) 50 mg, Oral, Every 8 Hours PRN    triamcinolone (KENALOG) 0.1 % cream 1 application , Topical, 2 Times Daily    Ubrelvy 100 MG tablet Take 1 tablet by mouth 1 (One) Time As Needed.    Wegovy 1 mg, Subcutaneous, Weekly       Medications Discontinued During This Encounter   Medication Reason    amoxicillin (AMOXIL) 875 MG tablet Reorder

## 2024-02-02 ENCOUNTER — TELEPHONE (OUTPATIENT)
Dept: FAMILY MEDICINE CLINIC | Facility: CLINIC | Age: 34
End: 2024-02-02
Payer: OTHER GOVERNMENT

## 2024-02-02 RX ORDER — AMOXICILLIN AND CLAVULANATE POTASSIUM 875; 125 MG/1; MG/1
1 TABLET, FILM COATED ORAL 2 TIMES DAILY
Qty: 20 TABLET | Refills: 0 | Status: SHIPPED | OUTPATIENT
Start: 2024-02-02 | End: 2024-02-12

## 2024-02-02 RX ORDER — L.ACIDOPH/B.ANIMALIS/B.LONGUM 15B CELL
1 CAPSULE ORAL DAILY
Qty: 90 CAPSULE | Refills: 3 | Status: SHIPPED | OUTPATIENT
Start: 2024-02-02

## 2024-02-02 RX ORDER — ONDANSETRON 4 MG/1
4 TABLET, ORALLY DISINTEGRATING ORAL EVERY 8 HOURS PRN
Qty: 30 TABLET | Refills: 1 | Status: SHIPPED | OUTPATIENT
Start: 2024-02-02

## 2024-02-02 RX ORDER — METHYLPREDNISOLONE 4 MG/1
TABLET ORAL
Qty: 21 TABLET | Refills: 0 | Status: SHIPPED | OUTPATIENT
Start: 2024-02-02

## 2024-02-02 NOTE — TELEPHONE ENCOUNTER
Caller: Lizzy Thai E    Relationship: Self    Best call back number: [phone]    Requested Prescriptions:   Requested Prescriptions     Pending Prescriptions Disp Refills    ondansetron ODT (ZOFRAN-ODT) 4 MG disintegrating tablet 30 tablet 1     Sig: Place 1 tablet on the tongue Every 8 (Eight) Hours As Needed for Nausea or Vomiting.        Pharmacy where request should be sent: [facility][id number] - [place], [place] - [address] [facility] [phone] [facility] [phone] FX     Last office visit with prescribing clinician: 12/29/2023   Last telemedicine visit with prescribing clinician: Visit date not found   Next office visit with prescribing clinician: 2/5/2024     Additional details provided by patient:     Does the patient have less than a 3 day supply:  [x] Yes  [] No    Would you like a call back once the refill request has been completed: [] Yes [] No    If the office needs to give you a call back, can they leave a voicemail: [] Yes [] No    Douglas Grey Rep   02/02/24 12:02 EST

## 2024-02-02 NOTE — TELEPHONE ENCOUNTER
Caller: Thai Ball    Relationship to patient: Self    Best call back number: [phone]    Patient is needing: PATIENT CALLED STATING HE WAS SEEN YESTERDAY BY DOCTOR MANUEL AND WAS PRESCRIBED AN ANTIBIOTIC FOR PNEUMONIA. PATIENT STATES HE TOOK HIS TWO DOSES YESTERDAY BUT IT IS NOT HELPING WITH HIS SYMPTOMS. PATIENT STATES HIS GIRLFRIEND WAS SEEN BY PILLO LERMA LAST WEEK WITH THE SAME SYMPTOMS AND WAS PRESCRIBED A DIFFERENT MEDICATION FOR A SINUS INFECTION AND PATIENT IS WANTING TO KNOW IF HIS MEDICATION COULD BE CHANGED SINCE HE DOES NOT THINK THE ANTIBIOTIC IS WORKING. PATIENT STATES HE WOULD LIKE TO SPEAK WITH PILLO LERMA ABOUT THIS BUT HAD NOT BEEN SEEN BY HER FOR THESE SYMPTOMS.

## 2024-02-05 ENCOUNTER — TELEPHONE (OUTPATIENT)
Dept: FAMILY MEDICINE CLINIC | Facility: CLINIC | Age: 34
End: 2024-02-05

## 2024-02-05 ENCOUNTER — OFFICE VISIT (OUTPATIENT)
Dept: FAMILY MEDICINE CLINIC | Facility: CLINIC | Age: 34
End: 2024-02-05
Payer: OTHER GOVERNMENT

## 2024-02-05 VITALS
WEIGHT: 315 LBS | DIASTOLIC BLOOD PRESSURE: 76 MMHG | HEIGHT: 74 IN | BODY MASS INDEX: 40.43 KG/M2 | SYSTOLIC BLOOD PRESSURE: 132 MMHG | OXYGEN SATURATION: 97 % | TEMPERATURE: 98 F | HEART RATE: 60 BPM

## 2024-02-05 DIAGNOSIS — N52.9 ERECTILE DYSFUNCTION, UNSPECIFIED ERECTILE DYSFUNCTION TYPE: ICD-10-CM

## 2024-02-05 DIAGNOSIS — K58.0 IRRITABLE BOWEL SYNDROME WITH DIARRHEA: Primary | ICD-10-CM

## 2024-02-05 PROCEDURE — 99214 OFFICE O/P EST MOD 30 MIN: CPT | Performed by: NURSE PRACTITIONER

## 2024-02-05 RX ORDER — DICYCLOMINE HCL 20 MG
20 TABLET ORAL EVERY 6 HOURS
Qty: 360 TABLET | Refills: 1 | Status: SHIPPED | OUTPATIENT
Start: 2024-02-05

## 2024-02-05 RX ORDER — TADALAFIL 5 MG/1
5 TABLET ORAL DAILY PRN
Qty: 30 TABLET | Refills: 1 | Status: SHIPPED | OUTPATIENT
Start: 2024-02-05

## 2024-02-05 NOTE — PROGRESS NOTES
Chief Complaint  Umbilical Discharge     Subjective          Thai Ball is a 33 y.o. male who presents to [facility]    History of Present Illness    Here today to talk about irritable bowel symptoms, cramping bloating and diarrhea at times. Usually happen 4 days a week.  Started taking Benefiber Gummies and that has helped.    Belly button discharge has improved since using the antibiotic ointment.    PHQ-2 Total Score:     PHQ-9 Total Score:          Review of Systems   Constitutional:  Negative for chills, fatigue and fever.   Respiratory:  Negative for cough and shortness of breath.    Cardiovascular:  Negative for chest pain and palpitations.   Gastrointestinal:  Negative for constipation, diarrhea, nausea and vomiting.   Musculoskeletal:  Negative for back pain and neck pain.   Skin:  Negative for rash.   Neurological:  Negative for dizziness and headaches.          Medical History: has a past medical history of Chronic pain, Depression, Fibromyalgia, Kidney stone, Migraine, Nausea and vomiting (02/08/2023), and Vitamin D deficiency.     Surgical History: has a past surgical history that includes Neck surgery; Esophagogastroduodenoscopy (N/A, 5/25/2023); Colonoscopy (N/A, 5/25/2023); and Cholecystectomy (N/A, 12/6/2023).     Family History: family history is not on file.     Social History: reports that he has never smoked. He has never been exposed to tobacco smoke. He has quit using smokeless tobacco.  His smokeless tobacco use included chew. He reports current drug use. Drug: Marijuana. He reports that he does not drink alcohol.    Allergies: Compazine [prochlorperazine] and Reglan [metoclopramide]      Health Maintenance Due   Topic Date Due    ANNUAL PHYSICAL  Never done    INFLUENZA VACCINE  08/01/2023    COVID-19 Vaccine (3 - 2023-24 season) 09/01/2023            Current Outpatient Medications:     albuterol sulfate  (90 Base) MCG/ACT inhaler, Inhale 2  puffs Every 6 (Six) Hours As Needed for Wheezing or Shortness of Air., Disp: 18 g, Rfl: 0    amoxicillin-clavulanate (AUGMENTIN) 875-125 MG per tablet, Take 1 tablet by mouth 2 (Two) Times a Day for 10 days., Disp: 20 tablet, Rfl: 0    baclofen (LIORESAL) 10 MG tablet, Take 1 tablet by mouth 3 (Three) Times a Day., Disp: , Rfl:     Blood Pressure Monitor device, Use 1 Device Daily., Disp: 1 each, Rfl: 0    celecoxib (CeleBREX) 100 MG capsule, Take 2 capsules by mouth 2 (Two) Times a Day As Needed for Mild Pain., Disp: , Rfl:     DULoxetine (CYMBALTA) 60 MG capsule, duloxetine 60 mg capsule,delayed release, Disp: , Rfl:     famotidine (PEPCID) 20 MG tablet, TAKE 1 TABLET BY MOUTH TWICE A DAY, Disp: 180 tablet, Rfl: 1    gabapentin (NEURONTIN) 600 MG tablet, Take 1 tablet by mouth 3 (Three) Times a Day., Disp: , Rfl:     galcanezumab-gnlm (Emgality) 120 MG/ML auto-injector pen, Emgality Pen 120 mg/mL subcutaneous pen injector  ADMINISTER 1 ML UNDER THE SKIN EVERY MONTH AS DIRECTED (90 day supply), Disp: , Rfl:     hydrOXYzine (ATARAX) 25 MG tablet, Take 1 tablet by mouth Daily., Disp: , Rfl:     meloxicam (MOBIC) 7.5 MG tablet, , Disp: , Rfl:     methylPREDNISolone (MEDROL) 4 MG dose pack, Take as directed on package instructions., Disp: 21 tablet, Rfl: 0    mirtazapine (REMERON) 15 MG tablet, Take 1 tablet by mouth Every Night., Disp: , Rfl:     mupirocin (BACTROBAN) 2 % cream, Apply 1 application  topically to the appropriate area as directed 3 (Three) Times a Day., Disp: 30 g, Rfl: 3    omeprazole (priLOSEC) 40 MG capsule, TAKE 1 CAPSULE BY MOUTH EVERY DAY, Disp: 90 capsule, Rfl: 1    ondansetron ODT (ZOFRAN-ODT) 4 MG disintegrating tablet, Place 1 tablet on the tongue Every 8 (Eight) Hours As Needed for Nausea or Vomiting., Disp: 30 tablet, Rfl: 1    Pain Reliever Plus 250-250-65 MG per tablet, , Disp: , Rfl:     pantoprazole (PROTONIX) 40 MG EC tablet, Take 1 tablet by mouth Daily., Disp: 30 tablet, Rfl: 5     "Probiotic Product (Florajen Digestion) capsule, Take 1 capsule by mouth Daily., Disp: 90 capsule, Rfl: 3    saccharomyces boulardii (Florastor) 250 MG capsule, Take 1 capsule by mouth 2 (Two) Times a Day., Disp: 60 capsule, Rfl: 5    Semaglutide-Weight Management (Wegovy) 1 MG/0.5ML solution auto-injector, Inject 0.5 mL under the skin into the appropriate area as directed 1 (One) Time Per Week., Disp: 2 mL, Rfl: 1    sucralfate (CARAFATE) 1 g tablet, Take 1 tablet by mouth 4 (Four) Times a Day., Disp: , Rfl:     tadalafil (CIALIS) 5 MG tablet, Take 1 tablet by mouth Daily As Needed for Erectile Dysfunction., Disp: 30 tablet, Rfl: 1    traMADol (ULTRAM) 50 MG tablet, Take 1 tablet by mouth Every 8 (Eight) Hours As Needed for Moderate Pain., Disp: 270 tablet, Rfl: 1    triamcinolone (KENALOG) 0.1 % cream, Apply 1 application  topically to the appropriate area as directed 2 (Two) Times a Day., Disp: 80 g, Rfl: 1    Ubrelvy 100 MG tablet, Take 1 tablet by mouth 1 (One) Time As Needed., Disp: , Rfl:     dicyclomine (BENTYL) 20 MG tablet, Take 1 tablet by mouth Every 6 (Six) Hours., Disp: 360 tablet, Rfl: 1    rifAXIMin (XIFAXAN) 200 MG tablet, Take 1 tablet by mouth 3 (Three) Times a Day for 14 days., Disp: 42 tablet, Rfl: 0      Immunization History   Administered Date(s) Administered    COVID-19 (UNSPECIFIED) 05/25/2021, 06/15/2021    Fluzone (or Fluarix & Flulaval for VFC) >6mos 01/13/2023    Influenza Injectable Mdck Pf Quad 01/13/2023    TD Preservative Free (Tenivac) 01/13/2023         Objective       Vitals:    02/05/24 0744   BP: 132/76   BP Location: Left arm   Patient Position: Sitting   Cuff Size: Large Adult   Pulse: 60   Temp: 98 °F (36.7 °C)   TempSrc: Temporal   SpO2: 97%   Weight: (!) 159 kg (350 lb 3.2 oz)   Height: 188 cm (74\")   PainSc: 0-No pain      Body mass index is 44.96 kg/m².   Wt Readings from Last 3 Encounters:   02/05/24 (!) 159 kg (350 lb 3.2 oz)   02/01/24 (!) 156 kg (344 lb)   01/04/24 (!) " 155 kg (342 lb 13 oz)      BP Readings from Last 3 Encounters:   02/05/24 132/76   02/01/24 127/86   01/04/24 130/78                Physical Exam  Vitals reviewed.   Constitutional:       Appearance: Normal appearance.   HENT:      Head: Normocephalic and atraumatic.   Cardiovascular:      Rate and Rhythm: Normal rate and regular rhythm.      Pulses: Normal pulses.      Heart sounds: Normal heart sounds.   Pulmonary:      Effort: Pulmonary effort is normal.      Breath sounds: Normal breath sounds.   Abdominal:      General: Bowel sounds are normal.      Palpations: Abdomen is soft.   Skin:     General: Skin is warm and dry.   Neurological:      Mental Status: He is alert and oriented to person, place, and time.   Psychiatric:         Mood and Affect: Mood normal.         Behavior: Behavior normal.         Thought Content: Thought content normal.         Judgment: Judgment normal.             Result Review :       Common labs          9/29/2023    11:52 11/10/2023    11:04 1/4/2024    09:37   Common Labs   Glucose 100  114  123    BUN 14  14  17    Creatinine 0.67  0.84  0.94    Sodium 138  138  138    Potassium 4.5  4.2  4.1    Chloride 106  104  101    Calcium 9.1  9.0  9.7    Albumin 4.4  4.3  4.4    Total Bilirubin 0.8  0.7  1.7    Alkaline Phosphatase 98  117  97    AST (SGOT) 52  37  51    ALT (SGPT) 89  74  107    WBC 10.57  12.99  12.20    Hemoglobin 16.0  16.5  16.7    Hematocrit 47.1  48.2  49.5    Platelets 293  364  356    Hemoglobin A1C 5.80                         Assessment and Plan        Diagnoses and all orders for this visit:    1. Irritable bowel syndrome with diarrhea (Primary)  -     rifAXIMin (XIFAXAN) 200 MG tablet; Take 1 tablet by mouth 3 (Three) Times a Day for 14 days.  Dispense: 42 tablet; Refill: 0  -     dicyclomine (BENTYL) 20 MG tablet; Take 1 tablet by mouth Every 6 (Six) Hours.  Dispense: 360 tablet; Refill: 1    2. Erectile dysfunction, unspecified erectile dysfunction type  -      tadalafil (CIALIS) 5 MG tablet; Take 1 tablet by mouth Daily As Needed for Erectile Dysfunction.  Dispense: 30 tablet; Refill: 1          Follow Up     Return if symptoms worsen or fail to improve.    Patient was given instructions and counseling regarding his condition or for health maintenance advice. Please see specific information pulled into the AVS if appropriate.     Stacy Conn, APRN

## 2024-02-09 ENCOUNTER — TELEPHONE (OUTPATIENT)
Dept: GASTROENTEROLOGY | Facility: CLINIC | Age: 34
End: 2024-02-09
Payer: OTHER GOVERNMENT

## 2024-02-09 ENCOUNTER — TELEPHONE (OUTPATIENT)
Dept: FAMILY MEDICINE CLINIC | Facility: CLINIC | Age: 34
End: 2024-02-09
Payer: OTHER GOVERNMENT

## 2024-02-09 ENCOUNTER — LAB (OUTPATIENT)
Dept: LAB | Facility: HOSPITAL | Age: 34
End: 2024-02-09
Payer: OTHER GOVERNMENT

## 2024-02-09 ENCOUNTER — TELEMEDICINE (OUTPATIENT)
Dept: FAMILY MEDICINE CLINIC | Facility: TELEHEALTH | Age: 34
End: 2024-02-09
Payer: OTHER GOVERNMENT

## 2024-02-09 VITALS — TEMPERATURE: 94.3 F

## 2024-02-09 DIAGNOSIS — R61 DIAPHORESIS: ICD-10-CM

## 2024-02-09 DIAGNOSIS — J32.9 SINUSITIS, UNSPECIFIED CHRONICITY, UNSPECIFIED LOCATION: ICD-10-CM

## 2024-02-09 DIAGNOSIS — R68.83 CHILLS (WITHOUT FEVER): ICD-10-CM

## 2024-02-09 DIAGNOSIS — F41.1 GENERALIZED ANXIETY DISORDER: Primary | ICD-10-CM

## 2024-02-09 DIAGNOSIS — J06.9 ACUTE UPPER RESPIRATORY INFECTION: Primary | ICD-10-CM

## 2024-02-09 LAB
T4 FREE SERPL-MCNC: 1.06 NG/DL (ref 0.93–1.7)
TSH SERPL DL<=0.05 MIU/L-ACNC: 0.82 UIU/ML (ref 0.27–4.2)

## 2024-02-09 PROCEDURE — 84482 T3 REVERSE: CPT | Performed by: NURSE PRACTITIONER

## 2024-02-09 PROCEDURE — 84439 ASSAY OF FREE THYROXINE: CPT | Performed by: NURSE PRACTITIONER

## 2024-02-09 PROCEDURE — 86800 THYROGLOBULIN ANTIBODY: CPT | Performed by: NURSE PRACTITIONER

## 2024-02-09 PROCEDURE — 84443 ASSAY THYROID STIM HORMONE: CPT | Performed by: NURSE PRACTITIONER

## 2024-02-09 PROCEDURE — 86376 MICROSOMAL ANTIBODY EACH: CPT | Performed by: NURSE PRACTITIONER

## 2024-02-09 RX ORDER — DOXYCYCLINE HYCLATE 100 MG/1
100 CAPSULE ORAL 2 TIMES DAILY
Qty: 8 CAPSULE | Refills: 0 | Status: SHIPPED | OUTPATIENT
Start: 2024-02-09 | End: 2024-02-13

## 2024-02-09 NOTE — TELEPHONE ENCOUNTER
Hub staff attempted to follow warm transfer process and was unsuccessful     Caller: LOAN DOTYD    Relationship to patient: SELF    Best call back number: [phone]     Patient is needing: MS. NGUYEN CONTACTED THE OFFICE WITH COMPLAINTS OF PAIN AROUND HIS NAVEL. THIS HAS BEEN GOING ON SINCE HIS GALL BLADDER REMOVAL ON 12/6/23. HE ALSO HAS COMPLAINTS HAS SWEATS BOTH COLD AND HOT, NAUSEA, DIARRHEA. PLEASE REVIEW AND ADVISE.    OK TO CALL BACK ANYTIME. OK TO LEAVE .

## 2024-02-09 NOTE — PROGRESS NOTES
You have chosen to receive care through a telehealth visit.  Do you consent to use a video/audio connection for your medical care today? Yes     HPI  Thai Ball is a 33 y.o. male  presents with complaint of nasal congestion, chills,  night sweats and cold sweats during the day,  diarrhea and abdominal pain periumbilical and intermittent.  He was treated with a steroid pack and Augmentin for a sinus infection last week. He felt well for 3 days then yesterday he began having Gi symptoms again. He is having chills, night sweates and cold sweats. He is having diarrhea with persistent ear fullness and nasal congestion. He has had 6 days of the Augmentin and he has finished his medrol dose pack. He reports no tenderness of the abdominal area. He reports having his Gallbladder removed in December without complications.     Review of Systems   Constitutional:  Positive for chills, diaphoresis and fatigue.   HENT:  Positive for congestion and ear pain (ear pressure). Negative for sore throat.    Respiratory: Negative.     Cardiovascular: Negative.  Negative for chest pain, palpitations and leg swelling.   Gastrointestinal:  Positive for abdominal pain (periumbillical) and diarrhea.   Genitourinary: Negative.    Musculoskeletal: Negative.    Skin: Negative.    Neurological: Negative.    Hematological: Negative.    Psychiatric/Behavioral: Negative.         Past Medical History:   Diagnosis Date    Chronic pain     Depression     Fibromyalgia     Kidney stone     Migraine     Nausea and vomiting 02/08/2023    Vitamin D deficiency        Family History   Problem Relation Age of Onset    Colon cancer Neg Hx        Social History     Socioeconomic History    Marital status:    Tobacco Use    Smoking status: Never     Passive exposure: Never    Smokeless tobacco: Former     Types: Chew   Vaping Use    Vaping Use: Former    Substances: Nicotine, Flavoring    Devices: Disposable    Passive vaping exposure: Yes    Substance and Sexual Activity    Alcohol use: Never    Drug use: Yes     Types: Marijuana     Comment: occ    Sexual activity: Defer         Temp 94.3 °F (34.6 °C)     PHYSICAL EXAM  Physical Exam   Constitutional: He is oriented to person, place, and time. He appears well-developed and well-nourished. He does not have a sickly appearance. He does not appear ill. No distress.   HENT:   Head: Normocephalic and atraumatic.   Right Ear: Hearing normal.   Left Ear: Hearing normal.   Nose: Nose normal.   Mouth/Throat: Mouth/Lips are normal.Mucous membranes are erythematous. No oropharyngeal exudate or tonsillar abscesses.   oral pharyngeal erythremia   Pulmonary/Chest: Effort normal.  No respiratory distress.  Abdominal: He exhibits no distension. There is no abdominal tenderness. There is no CVA tenderness.   Neurological: He is alert and oriented to person, place, and time.   Psychiatric: He has a normal mood and affect.   Vitals reviewed.      Diagnoses and all orders for this visit:    1. Acute upper respiratory infection (Primary)  -     SHELLEY FLU + SARS PCR; Future    2. Sinusitis, unspecified chronicity, unspecified location  -     doxycycline (VIBRAMYCIN) 100 MG capsule; Take 1 capsule by mouth 2 (Two) Times a Day for 4 days.  Dispense: 8 capsule; Refill: 0    3. Chills (without fever)    4. Diaphoresis    Poc test pending.   No milk or dairy  Stop Augmentin and start Doxy as directed  May use Imodium OTC for diarrhea.       FOLLOW-UP  As discussed during visit with [facility], if symptoms worsen or fail to improve, follow-up with PCP/Urgent Care/Emergency Department.    Patient verbalizes understanding of medications, instructions for treatment and follow-up.    Toyin Nur, PILLO  02/09/2024  11:36 EST    The use of a video visit has been reviewed with the patient and verbal informed consent has been obtained. Myself and Thai Ball participated in this visit. The patient is located in [facility]  and I am located in [place], [place]. [employer] and [employer] were utilized.

## 2024-02-09 NOTE — TELEPHONE ENCOUNTER
Spoke to pt on the pain around navel. States where the gallbladder was removed is around the navel recommended to call General surgery Dr. Mensah office to see what they thought he should do. I also told him with his symptoms to go to the ER to be further evaluated.

## 2024-02-09 NOTE — TELEPHONE ENCOUNTER
Caller: Thai Ball    Relationship to patient: Self    Best call back number: [phone]     Chief complaint: CHILLS, NIGHT SWEATS, STOMACH PAINS    Type of visit: SAME DAY    Requested date: 2/9/24     Additional notes:CALLER STATED THAT HE ONLY WANTED TO SEE AARON LERMA AND THERE WERE NO AVAILABLE APPOINTMENTS FOR 2/9/24

## 2024-02-09 NOTE — TELEPHONE ENCOUNTER
Made pt aware via [employer] message that no available appointments for today and none for next week until Friday. Advised ER or urgent care

## 2024-02-12 DIAGNOSIS — R19.7 DIARRHEA, UNSPECIFIED TYPE: ICD-10-CM

## 2024-02-12 DIAGNOSIS — R10.33 PERIUMBILICAL ABDOMINAL PAIN: Primary | ICD-10-CM

## 2024-02-12 LAB
THYROGLOB AB SERPL-ACNC: <1 IU/ML (ref 0–0.9)
THYROPEROXIDASE AB SERPL-ACNC: <9 IU/ML (ref 0–34)

## 2024-02-14 ENCOUNTER — OFFICE VISIT (OUTPATIENT)
Dept: FAMILY MEDICINE CLINIC | Facility: CLINIC | Age: 34
End: 2024-02-14
Payer: OTHER GOVERNMENT

## 2024-02-14 VITALS
DIASTOLIC BLOOD PRESSURE: 99 MMHG | SYSTOLIC BLOOD PRESSURE: 125 MMHG | BODY MASS INDEX: 40.43 KG/M2 | TEMPERATURE: 98.2 F | HEIGHT: 74 IN | OXYGEN SATURATION: 97 % | WEIGHT: 315 LBS | HEART RATE: 80 BPM

## 2024-02-14 DIAGNOSIS — R19.7 DIARRHEA, UNSPECIFIED TYPE: ICD-10-CM

## 2024-02-14 DIAGNOSIS — R10.33 PERIUMBILICAL ABDOMINAL PAIN: ICD-10-CM

## 2024-02-14 DIAGNOSIS — R10.33 UMBILICAL PAIN: Primary | ICD-10-CM

## 2024-02-14 DIAGNOSIS — E66.01 CLASS 3 SEVERE OBESITY DUE TO EXCESS CALORIES WITH SERIOUS COMORBIDITY AND BODY MASS INDEX (BMI) OF 45.0 TO 49.9 IN ADULT: ICD-10-CM

## 2024-02-14 LAB
ALBUMIN SERPL-MCNC: 4.2 G/DL (ref 3.5–5.2)
ALBUMIN/GLOB SERPL: 1.8 G/DL
ALP SERPL-CCNC: 125 U/L (ref 39–117)
ALT SERPL W P-5'-P-CCNC: 65 U/L (ref 1–41)
ANION GAP SERPL CALCULATED.3IONS-SCNC: 11 MMOL/L (ref 5–15)
AST SERPL-CCNC: 37 U/L (ref 1–40)
BASOPHILS # BLD AUTO: 0.09 10*3/MM3 (ref 0–0.2)
BASOPHILS NFR BLD AUTO: 0.8 % (ref 0–1.5)
BILIRUB SERPL-MCNC: 0.6 MG/DL (ref 0–1.2)
BUN SERPL-MCNC: 12 MG/DL (ref 6–20)
BUN/CREAT SERPL: 16 (ref 7–25)
CALCIUM SPEC-SCNC: 8.8 MG/DL (ref 8.6–10.5)
CHLORIDE SERPL-SCNC: 104 MMOL/L (ref 98–107)
CO2 SERPL-SCNC: 23 MMOL/L (ref 22–29)
CREAT SERPL-MCNC: 0.75 MG/DL (ref 0.76–1.27)
DEPRECATED RDW RBC AUTO: 37.8 FL (ref 37–54)
EGFRCR SERPLBLD CKD-EPI 2021: 123.7 ML/MIN/1.73
EOSINOPHIL # BLD AUTO: 0.3 10*3/MM3 (ref 0–0.4)
EOSINOPHIL NFR BLD AUTO: 2.6 % (ref 0.3–6.2)
ERYTHROCYTE [DISTWIDTH] IN BLOOD BY AUTOMATED COUNT: 12.2 % (ref 12.3–15.4)
GLOBULIN UR ELPH-MCNC: 2.4 GM/DL
GLUCOSE SERPL-MCNC: 96 MG/DL (ref 65–99)
HCT VFR BLD AUTO: 44.6 % (ref 37.5–51)
HGB BLD-MCNC: 15 G/DL (ref 13–17.7)
IMM GRANULOCYTES # BLD AUTO: 0.11 10*3/MM3 (ref 0–0.05)
IMM GRANULOCYTES NFR BLD AUTO: 1 % (ref 0–0.5)
LYMPHOCYTES # BLD AUTO: 3.58 10*3/MM3 (ref 0.7–3.1)
LYMPHOCYTES NFR BLD AUTO: 31.2 % (ref 19.6–45.3)
MCH RBC QN AUTO: 28.8 PG (ref 26.6–33)
MCHC RBC AUTO-ENTMCNC: 33.6 G/DL (ref 31.5–35.7)
MCV RBC AUTO: 85.6 FL (ref 79–97)
MONOCYTES # BLD AUTO: 0.8 10*3/MM3 (ref 0.1–0.9)
MONOCYTES NFR BLD AUTO: 7 % (ref 5–12)
NEUTROPHILS NFR BLD AUTO: 57.4 % (ref 42.7–76)
NEUTROPHILS NFR BLD AUTO: 6.58 10*3/MM3 (ref 1.7–7)
NRBC BLD AUTO-RTO: 0 /100 WBC (ref 0–0.2)
PLATELET # BLD AUTO: 284 10*3/MM3 (ref 140–450)
PMV BLD AUTO: 12.4 FL (ref 6–12)
POTASSIUM SERPL-SCNC: 4.2 MMOL/L (ref 3.5–5.2)
PROT SERPL-MCNC: 6.6 G/DL (ref 6–8.5)
RBC # BLD AUTO: 5.21 10*6/MM3 (ref 4.14–5.8)
SODIUM SERPL-SCNC: 138 MMOL/L (ref 136–145)
WBC NRBC COR # BLD AUTO: 11.46 10*3/MM3 (ref 3.4–10.8)

## 2024-02-14 PROCEDURE — 86037 ANCA TITER EACH ANTIBODY: CPT | Performed by: NURSE PRACTITIONER

## 2024-02-14 PROCEDURE — 86671 FUNGUS NES ANTIBODY: CPT | Performed by: NURSE PRACTITIONER

## 2024-02-14 PROCEDURE — 80053 COMPREHEN METABOLIC PANEL: CPT | Performed by: NURSE PRACTITIONER

## 2024-02-14 PROCEDURE — 87077 CULTURE AEROBIC IDENTIFY: CPT | Performed by: NURSE PRACTITIONER

## 2024-02-14 PROCEDURE — 87070 CULTURE OTHR SPECIMN AEROBIC: CPT | Performed by: NURSE PRACTITIONER

## 2024-02-14 PROCEDURE — 87205 SMEAR GRAM STAIN: CPT | Performed by: NURSE PRACTITIONER

## 2024-02-14 PROCEDURE — 85025 COMPLETE CBC W/AUTO DIFF WBC: CPT | Performed by: NURSE PRACTITIONER

## 2024-02-14 RX ORDER — SEMAGLUTIDE 0.25 MG/.5ML
0.25 INJECTION, SOLUTION SUBCUTANEOUS WEEKLY
Qty: 2 ML | Refills: 0 | Status: SHIPPED | OUTPATIENT
Start: 2024-02-14

## 2024-02-14 RX ORDER — SEMAGLUTIDE 0.5 MG/.5ML
0.5 INJECTION, SOLUTION SUBCUTANEOUS WEEKLY
Qty: 2 ML | Refills: 1 | Status: SHIPPED | OUTPATIENT
Start: 2024-03-14

## 2024-02-15 LAB
027 TOXIN: NORMAL
C DIFF TOX GENS STL QL NAA+PROBE: NEGATIVE

## 2024-02-15 PROCEDURE — 87493 C DIFF AMPLIFIED PROBE: CPT | Performed by: NURSE PRACTITIONER

## 2024-02-16 LAB — T3REVERSE SERPL-MCNC: 23.1 NG/DL (ref 9.2–24.1)

## 2024-02-17 LAB
BACTERIA SPEC AEROBE CULT: NORMAL
GRAM STN SPEC: NORMAL

## 2024-02-19 LAB
BAKER'S YEAST IGA QN: 29.5 UNITS (ref 0–24.9)
BAKER'S YEAST IGG QN: 45 UNITS (ref 0–24.9)
P-ANCA ATYPICAL TITR SER IF: ABNORMAL TITER

## 2024-02-23 ENCOUNTER — TELEPHONE (OUTPATIENT)
Dept: GASTROENTEROLOGY | Facility: CLINIC | Age: 34
End: 2024-02-23
Payer: OTHER GOVERNMENT

## 2024-02-23 NOTE — TELEPHONE ENCOUNTER
Caller:  LOAN NGUYEN    Relationship: SELF     Best call back number: [phone]     What is your medical concern? INFLAMMATORY BOWEL    How long has this issue been going on? COUPLE OF MONTHS    Is your provider already aware of this issue? JUST THE PCP    Have you been treated for this issue? NO    PCP DID LAB  AND DIAGNOSED WITH INFLAMMATORY BOWEL DISEASE. TESTING WAS HIGH ON BOTH. PCP ADVISED PATIENT TO FOLLOW UP WITH GI DOCTOR. MR. NGUYEN IS ASKING IF THE OFFICE CAN GO AHEAD AND TREAT HIM FOR THIS OR CAN HE GET A SOONER APPT. HE IS CURRENTLY SCHEDULED 5/21/24. PLEASE REVIEW AND ADVISE.    OK TO CALL BACK ANYTIME. OK TO LEAVE .

## 2024-03-20 ENCOUNTER — HOSPITAL ENCOUNTER (EMERGENCY)
Facility: HOSPITAL | Age: 34
Discharge: HOME | End: 2024-03-20
Attending: EMERGENCY MEDICINE | Admitting: EMERGENCY MEDICINE
Payer: OTHER GOVERNMENT

## 2024-03-20 VITALS
OXYGEN SATURATION: 98 % | WEIGHT: 315 LBS | RESPIRATION RATE: 16 BRPM | HEIGHT: 74 IN | TEMPERATURE: 98 F | BODY MASS INDEX: 40.43 KG/M2 | DIASTOLIC BLOOD PRESSURE: 80 MMHG | SYSTOLIC BLOOD PRESSURE: 128 MMHG | HEART RATE: 93 BPM

## 2024-03-20 DIAGNOSIS — L03.113 CELLULITIS OF RIGHT UPPER ARM: Primary | ICD-10-CM

## 2024-03-20 LAB
ALBUMIN SERPL-MCNC: 3.7 G/DL (ref 3.5–5.2)
ALBUMIN/GLOB SERPL: 1.2 G/DL
ALP SERPL-CCNC: 109 U/L (ref 39–117)
ALT SERPL W P-5'-P-CCNC: 212 U/L (ref 1–41)
ANION GAP SERPL CALCULATED.3IONS-SCNC: 13.2 MMOL/L (ref 5–15)
AST SERPL-CCNC: 530 U/L (ref 1–40)
BASOPHILS # BLD AUTO: 0.08 10*3/MM3 (ref 0–0.2)
BASOPHILS NFR BLD AUTO: 0.7 % (ref 0–1.5)
BILIRUB SERPL-MCNC: 1 MG/DL (ref 0–1.2)
BUN SERPL-MCNC: 11 MG/DL (ref 6–20)
BUN/CREAT SERPL: 15.3 (ref 7–25)
CALCIUM SPEC-SCNC: 8.8 MG/DL (ref 8.6–10.5)
CHLORIDE SERPL-SCNC: 102 MMOL/L (ref 98–107)
CO2 SERPL-SCNC: 23.8 MMOL/L (ref 22–29)
CREAT SERPL-MCNC: 0.72 MG/DL (ref 0.76–1.27)
DEPRECATED RDW RBC AUTO: 37.1 FL (ref 37–54)
EGFRCR SERPLBLD CKD-EPI 2021: 125.3 ML/MIN/1.73
EOSINOPHIL # BLD AUTO: 0.27 10*3/MM3 (ref 0–0.4)
EOSINOPHIL NFR BLD AUTO: 2.3 % (ref 0.3–6.2)
ERYTHROCYTE [DISTWIDTH] IN BLOOD BY AUTOMATED COUNT: 12.3 % (ref 12.3–15.4)
GLOBULIN UR ELPH-MCNC: 3.1 GM/DL
GLUCOSE SERPL-MCNC: 87 MG/DL (ref 65–99)
HCT VFR BLD AUTO: 46 % (ref 37.5–51)
HGB BLD-MCNC: 15.4 G/DL (ref 13–17.7)
HOLD SPECIMEN: NORMAL
HOLD SPECIMEN: NORMAL
IMM GRANULOCYTES # BLD AUTO: 0.07 10*3/MM3 (ref 0–0.05)
IMM GRANULOCYTES NFR BLD AUTO: 0.6 % (ref 0–0.5)
LYMPHOCYTES # BLD AUTO: 2.28 10*3/MM3 (ref 0.7–3.1)
LYMPHOCYTES NFR BLD AUTO: 19.3 % (ref 19.6–45.3)
MCH RBC QN AUTO: 28.3 PG (ref 26.6–33)
MCHC RBC AUTO-ENTMCNC: 33.5 G/DL (ref 31.5–35.7)
MCV RBC AUTO: 84.6 FL (ref 79–97)
MONOCYTES # BLD AUTO: 0.57 10*3/MM3 (ref 0.1–0.9)
MONOCYTES NFR BLD AUTO: 4.8 % (ref 5–12)
NEUTROPHILS NFR BLD AUTO: 72.3 % (ref 42.7–76)
NEUTROPHILS NFR BLD AUTO: 8.56 10*3/MM3 (ref 1.7–7)
NRBC BLD AUTO-RTO: 0 /100 WBC (ref 0–0.2)
PLATELET # BLD AUTO: 272 10*3/MM3 (ref 140–450)
PMV BLD AUTO: 12.5 FL (ref 6–12)
POTASSIUM SERPL-SCNC: 4.5 MMOL/L (ref 3.5–5.2)
PROT SERPL-MCNC: 6.8 G/DL (ref 6–8.5)
RBC # BLD AUTO: 5.44 10*6/MM3 (ref 4.14–5.8)
SODIUM SERPL-SCNC: 139 MMOL/L (ref 136–145)
WBC NRBC COR # BLD AUTO: 11.83 10*3/MM3 (ref 3.4–10.8)
WHOLE BLOOD HOLD COAG: NORMAL
WHOLE BLOOD HOLD SPECIMEN: NORMAL

## 2024-03-20 PROCEDURE — 36415 COLL VENOUS BLD VENIPUNCTURE: CPT

## 2024-03-20 PROCEDURE — 85025 COMPLETE CBC W/AUTO DIFF WBC: CPT | Performed by: EMERGENCY MEDICINE

## 2024-03-20 PROCEDURE — 99283 EMERGENCY DEPT VISIT LOW MDM: CPT

## 2024-03-20 PROCEDURE — 80053 COMPREHEN METABOLIC PANEL: CPT | Performed by: EMERGENCY MEDICINE

## 2024-03-20 RX ORDER — CEFUROXIME AXETIL 500 MG/1
500 TABLET ORAL 2 TIMES DAILY
Qty: 14 TABLET | Refills: 0 | Status: SHIPPED | OUTPATIENT
Start: 2024-03-20 | End: 2024-03-27
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
## 2024-03-20 NOTE — DISCHARGE INSTRUCTIONS
Make sure to continue taking the antibiotics until the entire course is finished.  Monitor your arm for signs of redness that spreads outside of the marked area.  Return to the emergency department for signs of worsening infection, including worsening or spreading redness, fevers or chills.  Also make sure you follow-up with your primary care provider.

## 2024-03-21 RX ORDER — ALBUTEROL SULFATE 90 UG/1
2 AEROSOL, METERED RESPIRATORY (INHALATION) EVERY 6 HOURS PRN
Qty: 18 G | Refills: 0 | Status: SHIPPED | OUTPATIENT
Start: 2024-03-21

## 2024-04-16 ENCOUNTER — OFFICE VISIT (OUTPATIENT)
Dept: GASTROENTEROLOGY | Facility: CLINIC | Age: 34
End: 2024-04-16
Payer: OTHER GOVERNMENT

## 2024-04-16 VITALS
DIASTOLIC BLOOD PRESSURE: 83 MMHG | BODY MASS INDEX: 40.43 KG/M2 | WEIGHT: 315 LBS | SYSTOLIC BLOOD PRESSURE: 135 MMHG | HEART RATE: 95 BPM | HEIGHT: 74 IN

## 2024-04-16 DIAGNOSIS — R15.2 FECAL URGENCY: ICD-10-CM

## 2024-04-16 DIAGNOSIS — R19.5 ABNORMAL STOOL TEST: ICD-10-CM

## 2024-04-16 DIAGNOSIS — K44.9 HIATAL HERNIA: ICD-10-CM

## 2024-04-16 DIAGNOSIS — K58.0 IRRITABLE BOWEL SYNDROME WITH DIARRHEA: Primary | ICD-10-CM

## 2024-04-16 PROCEDURE — 99214 OFFICE O/P EST MOD 30 MIN: CPT | Performed by: NURSE PRACTITIONER

## 2024-04-16 RX ORDER — ZOLPIDEM TARTRATE 5 MG/1
TABLET ORAL
COMMUNITY
Start: 2024-04-11

## 2024-04-16 RX ORDER — MONTELUKAST SODIUM 4 MG/1
1-2 TABLET, CHEWABLE ORAL 2 TIMES DAILY
Qty: 120 TABLET | Refills: 1 | Status: SHIPPED | OUTPATIENT
Start: 2024-04-16

## 2024-04-16 RX ORDER — MELOXICAM 15 MG/1
TABLET ORAL
COMMUNITY
Start: 2024-04-11
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
## 2024-04-18 DIAGNOSIS — R89.9 ABNORMAL LABORATORY TEST: Primary | ICD-10-CM

## 2024-04-18 DIAGNOSIS — K21.9 GASTROESOPHAGEAL REFLUX DISEASE, UNSPECIFIED WHETHER ESOPHAGITIS PRESENT: ICD-10-CM

## 2024-04-18 DIAGNOSIS — Z83.79 FH: CROHN'S DISEASE: ICD-10-CM

## 2024-04-18 DIAGNOSIS — R19.4 BOWEL HABIT CHANGES: ICD-10-CM

## 2024-04-18 RX ORDER — SODIUM, POTASSIUM,MAG SULFATES 17.5-3.13G
2 SOLUTION, RECONSTITUTED, ORAL ORAL TAKE AS DIRECTED
Qty: 177 ML | Refills: 0 | Status: SHIPPED | OUTPATIENT
Start: 2024-04-18

## 2024-04-18 NOTE — Clinical Note
Please call the patient and let him know I did talk to Dr. Rogers.  He is in agreement that we do an EGD and colonoscopy given that positive testing suspecting Crohn's disease.  I did send Suprep into his pharmacy.  Please help him get this scheduled.  Thanks

## 2024-04-19 ENCOUNTER — TELEPHONE (OUTPATIENT)
Dept: GASTROENTEROLOGY | Facility: CLINIC | Age: 34
End: 2024-04-19
Payer: OTHER GOVERNMENT

## 2024-04-19 NOTE — TELEPHONE ENCOUNTER
----- Message from PILLO Hicks sent at 4/18/2024 11:01 AM EDT -----  Please call the patient and let him know I did talk to Dr. Rogers.  He is in agreement that we do an EGD and colonoscopy given that positive testing suspecting Crohn's disease.  I did send Suprep into his pharmacy.  Please help him get this scheduled.  Thanks

## 2024-04-22 PROBLEM — R19.4 BOWEL HABIT CHANGES: Status: ACTIVE | Noted: 2024-04-18

## 2024-04-22 PROBLEM — R89.9 ABNORMAL LABORATORY TEST: Status: ACTIVE | Noted: 2024-04-18

## 2024-04-22 PROBLEM — Z83.79 FH: CROHN'S DISEASE: Status: ACTIVE | Noted: 2024-04-18

## 2024-04-22 PROBLEM — K21.9 GASTROESOPHAGEAL REFLUX DISEASE: Status: ACTIVE | Noted: 2024-04-18

## 2024-04-30 RX ORDER — PANTOPRAZOLE SODIUM 40 MG/1
40 TABLET, DELAYED RELEASE ORAL DAILY
Qty: 90 TABLET | Refills: 1 | Status: SHIPPED | OUTPATIENT
Start: 2024-04-30 | End: 2024-05-06 | Stop reason: SDUPTHER

## 2024-04-30 RX ORDER — SUCRALFATE 1 G/1
1 TABLET ORAL 4 TIMES DAILY
Qty: 360 TABLET | Refills: 1 | Status: SHIPPED | OUTPATIENT
Start: 2024-04-30 | End: 2024-05-06

## 2024-05-06 ENCOUNTER — OFFICE VISIT (OUTPATIENT)
Dept: FAMILY MEDICINE CLINIC | Facility: CLINIC | Age: 34
End: 2024-05-06
Payer: COMMERCIAL

## 2024-05-06 VITALS
HEART RATE: 71 BPM | DIASTOLIC BLOOD PRESSURE: 82 MMHG | HEIGHT: 74 IN | TEMPERATURE: 97.7 F | BODY MASS INDEX: 40.43 KG/M2 | WEIGHT: 315 LBS | OXYGEN SATURATION: 97 % | SYSTOLIC BLOOD PRESSURE: 124 MMHG

## 2024-05-06 DIAGNOSIS — F41.1 GENERALIZED ANXIETY DISORDER: ICD-10-CM

## 2024-05-06 DIAGNOSIS — M25.551 BILATERAL HIP PAIN: ICD-10-CM

## 2024-05-06 DIAGNOSIS — K21.9 GASTROESOPHAGEAL REFLUX DISEASE WITHOUT ESOPHAGITIS: ICD-10-CM

## 2024-05-06 DIAGNOSIS — R61 DIAPHORESIS: ICD-10-CM

## 2024-05-06 DIAGNOSIS — R73.03 PREDIABETES: ICD-10-CM

## 2024-05-06 DIAGNOSIS — M25.552 BILATERAL HIP PAIN: ICD-10-CM

## 2024-05-06 DIAGNOSIS — K58.0 IRRITABLE BOWEL SYNDROME WITH DIARRHEA: Primary | ICD-10-CM

## 2024-05-06 DIAGNOSIS — K44.9 HIATAL HERNIA WITH GERD: ICD-10-CM

## 2024-05-06 DIAGNOSIS — K21.9 HIATAL HERNIA WITH GERD: ICD-10-CM

## 2024-05-06 PROCEDURE — 99214 OFFICE O/P EST MOD 30 MIN: CPT | Performed by: NURSE PRACTITIONER

## 2024-05-06 RX ORDER — BUDESONIDE 3 MG/1
9 CAPSULE, COATED PELLETS ORAL EVERY MORNING
Qty: 90 CAPSULE | Refills: 2 | Status: SHIPPED | OUTPATIENT
Start: 2024-05-06

## 2024-05-06 RX ORDER — HYDROXYZINE 50 MG/1
50 TABLET, FILM COATED ORAL EVERY 8 HOURS PRN
Qty: 270 TABLET | Refills: 1 | Status: SHIPPED | OUTPATIENT
Start: 2024-05-06

## 2024-05-06 RX ORDER — TRAMADOL HYDROCHLORIDE 50 MG/1
50 TABLET ORAL EVERY 8 HOURS PRN
Qty: 270 TABLET | Refills: 1 | Status: SHIPPED | OUTPATIENT
Start: 2024-05-06

## 2024-05-06 RX ORDER — PANTOPRAZOLE SODIUM 40 MG/1
40 TABLET, DELAYED RELEASE ORAL DAILY
Qty: 90 TABLET | Refills: 3 | Status: SHIPPED | OUTPATIENT
Start: 2024-05-06

## 2024-05-14 NOTE — PAT
RECEIVED CALL BACK FROM PATIENT VERIFIED ARRIVAL TIME      Reminded of arrival time at   0600AM      , Entrance C of the main hospital. Instructed to bring or have a  over the age of 18 set up to drive you home the day of procedure.    Instructed on clear liquid diet the day before, nothing red or purple. Call with any questions about the prep or if in need of the prep.  Reminded them not to eat or drink anything am of procedure unless its a sip of water with medications.

## 2024-05-16 ENCOUNTER — ANESTHESIA EVENT (OUTPATIENT)
Dept: GASTROENTEROLOGY | Facility: HOSPITAL | Age: 34
End: 2024-05-16
Payer: COMMERCIAL

## 2024-05-16 NOTE — ANESTHESIA PREPROCEDURE EVALUATION
Anesthesia Evaluation     Patient summary reviewed and Nursing notes reviewed   NPO Solid Status: > 8 hours  NPO Liquid Status: > 8 hours           Airway   Mallampati: I  TM distance: >3 FB  Neck ROM: full  No difficulty expected  Dental - normal exam     Pulmonary - normal exam    breath sounds clear to auscultation  (+) ,shortness of breath  Cardiovascular - normal exam  Exercise tolerance: good (4-7 METS)    ECG reviewed  Rhythm: regular  Rate: normal        Neuro/Psych  (+) headaches, psychiatric history Depression and Anxiety  GI/Hepatic/Renal/Endo    (+) obesity, morbid obesity, hiatal hernia, GERD poorly controlled, renal disease- stones    Musculoskeletal     Abdominal   (+) obese    Abdomen: soft.   Substance History      OB/GYN          Other   arthritis,     ROS/Med Hx Other: HEART RATE= 136  bpm  RR Interval= 440  ms  CT Interval= 120  ms  P Horizontal Axis= 23  deg  P Front Axis= 49  deg  QRSD Interval= 109  ms  QT Interval= 299  ms  QRS Axis= 89  deg  T Wave Axis= -19  deg  - BORDERLINE ECG -  Sinus tachycardia  Borderline T abnormalities, inferior leads  Abnormal anterior R wave progression, late transition  No previous ECG available for comparison  Electronically Signed By: Ja Thorne ([facility]) 15-Feb-2023 02:47:14  Date and Time of Study: 2023-02-03 10:54:50        Phys Exam Other: Full beard               Anesthesia Plan    ASA 3     general   total IV anesthesia  (Total IV Anesthesia    Patient understands anesthesia not responsible for dental damage.  )  intravenous induction     Anesthetic plan, risks, benefits, and alternatives have been provided, discussed and informed consent has been obtained with: patient and spouse/significant other.  Pre-procedure education provided  Plan discussed with CRNA.      CODE STATUS:

## 2024-05-17 ENCOUNTER — HOSPITAL ENCOUNTER (OUTPATIENT)
Facility: HOSPITAL | Age: 34
Setting detail: HOSPITAL OUTPATIENT SURGERY
Discharge: HOME | End: 2024-05-17
Attending: INTERNAL MEDICINE | Admitting: INTERNAL MEDICINE
Payer: COMMERCIAL

## 2024-05-17 ENCOUNTER — ANESTHESIA (OUTPATIENT)
Dept: GASTROENTEROLOGY | Facility: HOSPITAL | Age: 34
End: 2024-05-17
Payer: COMMERCIAL

## 2024-05-17 VITALS
SYSTOLIC BLOOD PRESSURE: 150 MMHG | HEART RATE: 66 BPM | OXYGEN SATURATION: 100 % | BODY MASS INDEX: 44.21 KG/M2 | WEIGHT: 315 LBS | DIASTOLIC BLOOD PRESSURE: 98 MMHG | TEMPERATURE: 97.1 F | RESPIRATION RATE: 21 BRPM

## 2024-05-17 DIAGNOSIS — R19.4 BOWEL HABIT CHANGES: ICD-10-CM

## 2024-05-17 DIAGNOSIS — K21.9 GASTROESOPHAGEAL REFLUX DISEASE, UNSPECIFIED WHETHER ESOPHAGITIS PRESENT: ICD-10-CM

## 2024-05-17 DIAGNOSIS — Z83.79 FH: CROHN'S DISEASE: ICD-10-CM

## 2024-05-17 DIAGNOSIS — R89.9 ABNORMAL LABORATORY TEST: ICD-10-CM

## 2024-05-17 PROCEDURE — 86140 C-REACTIVE PROTEIN: CPT | Performed by: INTERNAL MEDICINE

## 2024-05-17 PROCEDURE — 81479 UNLISTED MOLECULAR PATHOLOGY: CPT | Performed by: INTERNAL MEDICINE

## 2024-05-17 PROCEDURE — 83520 IMMUNOASSAY QUANT NOS NONAB: CPT | Performed by: INTERNAL MEDICINE

## 2024-05-17 PROCEDURE — 25010000002 PROPOFOL 10 MG/ML EMULSION: Performed by: NURSE ANESTHETIST, CERTIFIED REGISTERED

## 2024-05-17 PROCEDURE — 82397 CHEMILUMINESCENT ASSAY: CPT | Performed by: INTERNAL MEDICINE

## 2024-05-17 PROCEDURE — 88305 TISSUE EXAM BY PATHOLOGIST: CPT | Performed by: INTERNAL MEDICINE

## 2024-05-17 PROCEDURE — 45380 COLONOSCOPY AND BIOPSY: CPT | Performed by: INTERNAL MEDICINE

## 2024-05-17 PROCEDURE — 25810000003 LACTATED RINGERS PER 1000 ML: Performed by: NURSE ANESTHETIST, CERTIFIED REGISTERED

## 2024-05-17 PROCEDURE — 43239 EGD BIOPSY SINGLE/MULTIPLE: CPT | Performed by: INTERNAL MEDICINE

## 2024-05-17 PROCEDURE — 81405 MOPATH PROCEDURE LEVEL 6: CPT | Performed by: INTERNAL MEDICINE

## 2024-05-17 PROCEDURE — 88346 IMFLUOR 1ST 1ANTB STAIN PX: CPT | Performed by: INTERNAL MEDICINE

## 2024-05-17 PROCEDURE — 88350 IMFLUOR EA ADDL 1ANTB STN PX: CPT | Performed by: INTERNAL MEDICINE

## 2024-05-17 RX ORDER — PROPOFOL 10 MG/ML
VIAL (ML) INTRAVENOUS AS NEEDED
Status: DISCONTINUED | OUTPATIENT
Start: 2024-05-17 | End: 2024-05-17 | Stop reason: SURG

## 2024-05-17 RX ORDER — LIDOCAINE HYDROCHLORIDE 20 MG/ML
INJECTION, SOLUTION EPIDURAL; INFILTRATION; INTRACAUDAL; PERINEURAL AS NEEDED
Status: DISCONTINUED | OUTPATIENT
Start: 2024-05-17 | End: 2024-05-17 | Stop reason: SURG

## 2024-05-17 RX ORDER — SODIUM CHLORIDE, SODIUM LACTATE, POTASSIUM CHLORIDE, CALCIUM CHLORIDE 600; 310; 30; 20 MG/100ML; MG/100ML; MG/100ML; MG/100ML
30 INJECTION, SOLUTION INTRAVENOUS CONTINUOUS
Status: DISCONTINUED | OUTPATIENT
Start: 2024-05-17 | End: 2024-05-17 | Stop reason: HOSPADM

## 2024-05-17 RX ADMIN — PROPOFOL 100 MG: 10 INJECTION, EMULSION INTRAVENOUS at 07:49

## 2024-05-17 RX ADMIN — LIDOCAINE HYDROCHLORIDE 150 MG: 20 INJECTION, SOLUTION EPIDURAL; INFILTRATION; INTRACAUDAL; PERINEURAL at 07:49

## 2024-05-17 RX ADMIN — PROPOFOL 50 MG: 10 INJECTION, EMULSION INTRAVENOUS at 07:57

## 2024-05-17 RX ADMIN — PROPOFOL 50 MG: 10 INJECTION, EMULSION INTRAVENOUS at 07:54

## 2024-05-17 RX ADMIN — PROPOFOL 200 MCG/KG/MIN: 10 INJECTION, EMULSION INTRAVENOUS at 07:49

## 2024-05-17 RX ADMIN — SODIUM CHLORIDE, POTASSIUM CHLORIDE, SODIUM LACTATE AND CALCIUM CHLORIDE 30 ML/HR: 600; 310; 30; 20 INJECTION, SOLUTION INTRAVENOUS at 06:38

## 2024-05-17 NOTE — ANESTHESIA POSTPROCEDURE EVALUATION
Patient: Thai Ball    Procedure Summary       Date: 05/17/24 Room / Location: [facility] ENDOSCOPY 2 / [facility] END[facility]    Anesthesia Start: 0723 Anesthesia Stop: 0817    Procedures:       ESOPHAGOGASTRODUODENOSCOPY WITH BIOPSIES      COLONOSCOPY WITH BIOPSIES Diagnosis:       Abnormal laboratory test      Bowel habit changes      Gastroesophageal reflux disease, unspecified whether esophagitis present      FH: Crohn's disease      (Abnormal laboratory test [R89.9])      (Bowel habit changes [R19.4])      (Gastroesophageal reflux disease, unspecified whether esophagitis present [K21.9])      (FH: Crohn's disease [Z83.79])    Surgeons: Anirudh Rogers MD Provider: Chris Chand CRNA    Anesthesia Type: general ASA Status: 3            Anesthesia Type: general    Vitals  Vitals Value Taken Time   /98 05/17/24 0836   Temp 36.2 °C (97.1 °F) 05/17/24 0835   Pulse 66 05/17/24 0837   Resp 21 05/17/24 0835   SpO2 99 % 05/17/24 0837   Vitals shown include unfiled device data.        Post Anesthesia Care and Evaluation    Patient location during evaluation: bedside  Patient participation: complete - patient participated  Level of consciousness: awake and alert  Pain score: 0  Pain management: satisfactory to patient    Airway patency: patent  Anesthetic complications: No anesthetic complications  PONV Status: controlled  Cardiovascular status: acceptable and stable  Respiratory status: acceptable, spontaneous ventilation and room air  Hydration status: acceptable
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
## 2024-05-18 DIAGNOSIS — K58.0 IRRITABLE BOWEL SYNDROME WITH DIARRHEA: ICD-10-CM

## 2024-05-20 ENCOUNTER — TELEPHONE (OUTPATIENT)
Dept: GASTROENTEROLOGY | Facility: CLINIC | Age: 34
End: 2024-05-20
Payer: COMMERCIAL

## 2024-05-20 RX ORDER — BUDESONIDE 3 MG/1
9 CAPSULE, COATED PELLETS ORAL EVERY MORNING
Qty: 270 CAPSULE | OUTPATIENT
Start: 2024-05-20

## 2024-05-20 NOTE — TELEPHONE ENCOUNTER
Patient has been scheduled for 05.21.24 at [facility] for CT enterography.    Patient is to be at the hospital at 230pm for for a 4pm scan    Liquids only 4 hours prior to scan    Patient is aware and voiced understanding

## 2024-05-21 ENCOUNTER — OFFICE VISIT (OUTPATIENT)
Dept: FAMILY MEDICINE CLINIC | Facility: CLINIC | Age: 34
End: 2024-05-21
Payer: COMMERCIAL

## 2024-05-21 ENCOUNTER — APPOINTMENT (OUTPATIENT)
Dept: CT IMAGING | Facility: HOSPITAL | Age: 34
End: 2024-05-21
Payer: COMMERCIAL

## 2024-05-21 ENCOUNTER — HOSPITAL ENCOUNTER (OUTPATIENT)
Dept: GENERAL RADIOLOGY | Facility: HOSPITAL | Age: 34
Discharge: HOME | End: 2024-05-21
Payer: COMMERCIAL

## 2024-05-21 ENCOUNTER — LAB (OUTPATIENT)
Dept: LAB | Facility: HOSPITAL | Age: 34
End: 2024-05-21
Payer: COMMERCIAL

## 2024-05-21 VITALS
HEIGHT: 74 IN | DIASTOLIC BLOOD PRESSURE: 69 MMHG | BODY MASS INDEX: 40.43 KG/M2 | HEART RATE: 91 BPM | WEIGHT: 315 LBS | TEMPERATURE: 98.2 F | OXYGEN SATURATION: 98 % | SYSTOLIC BLOOD PRESSURE: 104 MMHG

## 2024-05-21 DIAGNOSIS — R61 DIAPHORESIS: ICD-10-CM

## 2024-05-21 DIAGNOSIS — K58.0 IRRITABLE BOWEL SYNDROME WITH DIARRHEA: ICD-10-CM

## 2024-05-21 DIAGNOSIS — M79.675 GREAT TOE PAIN, LEFT: Primary | ICD-10-CM

## 2024-05-21 DIAGNOSIS — R73.03 PREDIABETES: ICD-10-CM

## 2024-05-21 DIAGNOSIS — M79.675 GREAT TOE PAIN, LEFT: ICD-10-CM

## 2024-05-21 DIAGNOSIS — D72.829 LEUKOCYTOSIS, UNSPECIFIED TYPE: Primary | ICD-10-CM

## 2024-05-21 LAB
DEPRECATED RDW RBC AUTO: 37.2 FL (ref 37–54)
ERYTHROCYTE [DISTWIDTH] IN BLOOD BY AUTOMATED COUNT: 11.8 % (ref 12.3–15.4)
HBA1C MFR BLD: 5.6 % (ref 4.8–5.6)
HCT VFR BLD AUTO: 46.5 % (ref 37.5–51)
HGB BLD-MCNC: 15.5 G/DL (ref 13–17.7)
MCH RBC QN AUTO: 28.8 PG (ref 26.6–33)
MCHC RBC AUTO-ENTMCNC: 33.3 G/DL (ref 31.5–35.7)
MCV RBC AUTO: 86.4 FL (ref 79–97)
PLATELET # BLD AUTO: 185 10*3/MM3 (ref 140–450)
PMV BLD AUTO: 11.7 FL (ref 6–12)
RBC # BLD AUTO: 5.38 10*6/MM3 (ref 4.14–5.8)
URATE SERPL-MCNC: 6.9 MG/DL (ref 3.4–7)
WBC NRBC COR # BLD AUTO: 11.31 10*3/MM3 (ref 3.4–10.8)

## 2024-05-21 PROCEDURE — 83036 HEMOGLOBIN GLYCOSYLATED A1C: CPT

## 2024-05-21 PROCEDURE — 85027 COMPLETE CBC AUTOMATED: CPT

## 2024-05-21 PROCEDURE — 83525 ASSAY OF INSULIN: CPT

## 2024-05-21 PROCEDURE — 84550 ASSAY OF BLOOD/URIC ACID: CPT

## 2024-05-21 PROCEDURE — 36415 COLL VENOUS BLD VENIPUNCTURE: CPT

## 2024-05-21 PROCEDURE — 73660 X-RAY EXAM OF TOE(S): CPT

## 2024-05-21 PROCEDURE — 99213 OFFICE O/P EST LOW 20 MIN: CPT | Performed by: NURSE PRACTITIONER

## 2024-05-21 NOTE — PROGRESS NOTES
Chief Complaint  Toe Pain (Left big toe- no injury )    Subjective          Thai Ball is a 33 y.o. male who presents to [facility]    History of Present Illness    Complains of left big toe pain, was playing in a [employer] tournament last week, no history of injury to foot, as the night went on the pain got worse and worse    PHQ-2 Total Score:     PHQ-9 Total Score:          Review of Systems       Medical History: has a past medical history of Chronic pain, Depression, Fibromyalgia, Kidney stone, Migraine, Nausea and vomiting (02/08/2023), and Vitamin D deficiency.     Surgical History: has a past surgical history that includes Neck surgery; Esophagogastroduodenoscopy (N/A, 5/25/2023); Colonoscopy (N/A, 5/25/2023); Cholecystectomy (N/A, 12/6/2023); Esophagogastroduodenoscopy (N/A, 5/17/2024); and Colonoscopy (N/A, 5/17/2024).     Family History: family history is not on file.     Social History: reports that he has never smoked. He has never been exposed to tobacco smoke. He has quit using smokeless tobacco.  His smokeless tobacco use included chew. He reports current drug use. Drug: Marijuana. He reports that he does not drink alcohol.    Allergies: Compazine [prochlorperazine] and Reglan [metoclopramide]      Health Maintenance Due   Topic Date Due    ANNUAL PHYSICAL  Never done    COVID-19 Vaccine (3 - 2023-24 season) 09/01/2023            Current Outpatient Medications:     albuterol sulfate  (90 Base) MCG/ACT inhaler, Inhale 2 puffs Every 6 (Six) Hours As Needed for Wheezing or Shortness of Air., Disp: 18 g, Rfl: 0    baclofen (LIORESAL) 10 MG tablet, Take 1 tablet by mouth 3 (Three) Times a Day., Disp: , Rfl:     Blood Pressure Monitor device, Use 1 Device Daily., Disp: 1 each, Rfl: 0    Budesonide (ENTOCORT EC) 3 MG 24 hr capsule, Take 3 capsules by mouth Every Morning. For 6 weeks then 2 capsules every morning for 6 weeks the 1 capsule every morning for 6  weeks, Disp: 90 capsule, Rfl: 2    celecoxib (CeleBREX) 100 MG capsule, Take 2 capsules by mouth 2 (Two) Times a Day As Needed for Mild Pain., Disp: , Rfl:     colestipol (COLESTID) 1 g tablet, Take 1-2 tablets by mouth 2 (Two) Times a Day., Disp: 120 tablet, Rfl: 1    dicyclomine (BENTYL) 20 MG tablet, Take 1 tablet by mouth Every 6 (Six) Hours., Disp: 360 tablet, Rfl: 1    DULoxetine (CYMBALTA) 60 MG capsule, duloxetine 60 mg capsule,delayed release, Disp: , Rfl:     gabapentin (NEURONTIN) 600 MG tablet, Take 1 tablet by mouth 3 (Three) Times a Day., Disp: , Rfl:     galcanezumab-gnlm (Emgality) 120 MG/ML auto-injector pen, Emgality Pen 120 mg/mL subcutaneous pen injector  ADMINISTER 1 ML UNDER THE SKIN EVERY MONTH AS DIRECTED (90 day supply), Disp: , Rfl:     hydrOXYzine (ATARAX) 50 MG tablet, Take 1 tablet by mouth Every 8 (Eight) Hours As Needed for Anxiety., Disp: 270 tablet, Rfl: 1    meloxicam (MOBIC) 15 MG tablet, , Disp: , Rfl:     pantoprazole (PROTONIX) 40 MG EC tablet, Take 1 tablet by mouth Daily., Disp: 90 tablet, Rfl: 3    tadalafil (CIALIS) 5 MG tablet, Take 1 tablet by mouth Daily As Needed for Erectile Dysfunction., Disp: 30 tablet, Rfl: 1    traMADol (ULTRAM) 50 MG tablet, Take 1 tablet by mouth Every 8 (Eight) Hours As Needed for Moderate Pain., Disp: 270 tablet, Rfl: 1    Ubrelvy 100 MG tablet, Take 1 tablet by mouth 1 (One) Time As Needed., Disp: , Rfl:     zolpidem (AMBIEN) 5 MG tablet, , Disp: , Rfl:       Immunization History   Administered Date(s) Administered    COVID-19 (UNSPECIFIED) 05/25/2021, 06/15/2021    Fluzone (or Fluarix & Flulaval for VFC) >6mos 01/13/2023    Influenza Injectable Mdck Pf Quad 01/13/2023    TD Preservative Free (Tenivac) 01/13/2023         Objective       Vitals:    05/21/24 0656   BP: 104/69   BP Location: Left arm   Patient Position: Sitting   Cuff Size: Large Adult   Pulse: 91   Temp: 98.2 °F (36.8 °C)   TempSrc: Temporal   SpO2: 98%   Weight: (!) 156 kg (343  "lb 6.4 oz)   Height: 188 cm (74\")   PainSc:   6      Body mass index is 44.09 kg/m².   Wt Readings from Last 3 Encounters:   05/21/24 (!) 156 kg (343 lb 6.4 oz)   05/17/24 (!) 156 kg (344 lb 5.7 oz)   05/06/24 (!) 157 kg (345 lb 3.2 oz)      BP Readings from Last 3 Encounters:   05/21/24 104/69   05/17/24 150/98   05/06/24 124/82                Physical Exam  Vitals reviewed.   Constitutional:       Appearance: Normal appearance.   Cardiovascular:      Rate and Rhythm: Normal rate and regular rhythm.      Pulses: Normal pulses.      Heart sounds: Normal heart sounds.   Pulmonary:      Effort: Pulmonary effort is normal.      Breath sounds: Normal breath sounds.   Musculoskeletal:      Comments: Left great toe slightly swollen, no redness or warmth at the area   Skin:     General: Skin is warm and dry.   Neurological:      Mental Status: He is alert and oriented to person, place, and time.   Psychiatric:         Mood and Affect: Mood normal.         Behavior: Behavior normal.         Thought Content: Thought content normal.         Judgment: Judgment normal.             Result Review :       Common labs          1/4/2024    09:37 2/14/2024    09:22 3/20/2024    14:50   Common Labs   Glucose 123  96  87    BUN 17  12  11    Creatinine 0.94  0.75  0.72    Sodium 138  138  139    Potassium 4.1  4.2  4.5    Chloride 101  104  102    Calcium 9.7  8.8  8.8    Albumin 4.4  4.2  3.7    Total Bilirubin 1.7  0.6  1.0    Alkaline Phosphatase 97  125  109    AST (SGOT) 51  37  530    ALT (SGPT) 107  65  212    WBC 12.20  11.46  11.83    Hemoglobin 16.7  15.0  15.4    Hematocrit 49.5  44.6  46.0    Platelets 356  284  272                       Assessment and Plan        Diagnoses and all orders for this visit:    1. Great toe pain, left (Primary)  -     Cancel: XR Toe 2+ View Left (In Office); Future  -     Uric Acid; Future  -     XR Toe 2+ View Left (In Office); Future          Follow Up     Return if symptoms worsen or fail to " improve, for Next scheduled follow up.    Patient was given instructions and counseling regarding his condition or for health maintenance advice. Please see specific information pulled into the AVS if appropriate. Patient understands the importance of having any ordered tests to be performed in a timely fashion.    I spent 10 minutes caring for Thai on this date of service. This time includes time spent by me in the following activities: preparing for the visit, reviewing tests, obtaining and/or reviewing a separately obtained history, performing a medically appropriate examination and/or evaluation, and ordering medications, tests, or procedures      Stacy Conn, PILLO

## 2024-05-22 ENCOUNTER — TELEPHONE (OUTPATIENT)
Dept: GASTROENTEROLOGY | Facility: CLINIC | Age: 34
End: 2024-05-22
Payer: COMMERCIAL

## 2024-05-22 LAB — INSULIN SERPL-ACNC: 17.7 UIU/ML (ref 2.6–24.9)

## 2024-05-22 NOTE — TELEPHONE ENCOUNTER
Attempted to contact patient, left voicemail message to return call. Provided direct contact information.    Spoke with alternate contact, MsKaron Lizzy (ISAIAS on file), and informed of PILLO Garcia result note and recommendations. Verified understanding.    Advised to avoid all NSAIDs including Mobic and Celebrex which is currently listed as current medications.  Advised to speak with managing provider to discuss alternative therapies.  Ms. Ball verbalized understanding.    Confirmed follow up appointment with Hillary Pedro on 06.18.24 at 1115.    Reminded to keep CT scheduled on 05.30.24 at 1100.

## 2024-05-22 NOTE — TELEPHONE ENCOUNTER
Hillary Pedro APRN Hornung, Melissa, RN  Patient's colon biopsy consistent with collagenous colitis.  This is a chronic inflammatory disease of the colon.  This most likely being the cause of the patient's diarrhea.  For treatment we do a budesonide taper which it appears that the patient is current on this medication.  It is important to avoid NSAIDs as this can be a trigger or cause of microscopic colitis.  It is also important if the patient is a smoker to stop smoking as this increases the risk of microscopic colitis.   Maintain follow-up.  Proceed with imaging as planned.

## 2024-05-23 LAB — REF LAB TEST METHOD: NORMAL

## 2024-05-24 DIAGNOSIS — K21.9 GASTROESOPHAGEAL REFLUX DISEASE WITHOUT ESOPHAGITIS: ICD-10-CM

## 2024-05-24 DIAGNOSIS — K44.9 HIATAL HERNIA WITH GERD: ICD-10-CM

## 2024-05-24 DIAGNOSIS — K21.9 HIATAL HERNIA WITH GERD: ICD-10-CM

## 2024-05-24 RX ORDER — PANTOPRAZOLE SODIUM 40 MG/1
40 TABLET, DELAYED RELEASE ORAL 2 TIMES DAILY
Qty: 180 TABLET | Refills: 0 | Status: SHIPPED | OUTPATIENT
Start: 2024-05-24

## 2024-06-03 ENCOUNTER — TELEPHONE (OUTPATIENT)
Dept: GASTROENTEROLOGY | Facility: CLINIC | Age: 34
End: 2024-06-03
Payer: COMMERCIAL

## 2024-06-06 RX ORDER — SUCRALFATE 1 G/1
1 TABLET ORAL 4 TIMES DAILY
Qty: 120 TABLET | Refills: 0 | Status: SHIPPED | OUTPATIENT
Start: 2024-06-06 | End: 2024-07-06

## 2024-06-12 DIAGNOSIS — K58.0 IRRITABLE BOWEL SYNDROME WITH DIARRHEA: ICD-10-CM

## 2024-06-12 RX ORDER — MONTELUKAST SODIUM 4 MG/1
1-2 TABLET, CHEWABLE ORAL 2 TIMES DAILY
Qty: 120 TABLET | Refills: 1 | Status: SHIPPED | OUTPATIENT
Start: 2024-06-12

## 2024-06-12 NOTE — TELEPHONE ENCOUNTER
Medication Requested Colestipol    Last Refill 4/16/24    Last OV 4/16/24    Next OV 6/18/24    Medication pended for approval and correct pharmacy verified Yes
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
## 2024-06-18 ENCOUNTER — OFFICE VISIT (OUTPATIENT)
Dept: GASTROENTEROLOGY | Facility: CLINIC | Age: 34
End: 2024-06-18
Payer: COMMERCIAL

## 2024-06-18 VITALS
SYSTOLIC BLOOD PRESSURE: 123 MMHG | DIASTOLIC BLOOD PRESSURE: 83 MMHG | OXYGEN SATURATION: 98 % | BODY MASS INDEX: 40.43 KG/M2 | HEART RATE: 73 BPM | WEIGHT: 315 LBS | HEIGHT: 74 IN

## 2024-06-18 DIAGNOSIS — K52.831 COLLAGENOUS COLITIS: ICD-10-CM

## 2024-06-18 DIAGNOSIS — Z83.79 FH: CROHN'S DISEASE: ICD-10-CM

## 2024-06-18 DIAGNOSIS — K21.9 GASTROESOPHAGEAL REFLUX DISEASE, UNSPECIFIED WHETHER ESOPHAGITIS PRESENT: ICD-10-CM

## 2024-06-18 DIAGNOSIS — R15.2 FECAL URGENCY: ICD-10-CM

## 2024-06-18 DIAGNOSIS — K58.0 IRRITABLE BOWEL SYNDROME WITH DIARRHEA: Primary | ICD-10-CM

## 2024-06-18 DIAGNOSIS — R19.4 BOWEL HABIT CHANGES: ICD-10-CM

## 2024-06-18 PROCEDURE — 99214 OFFICE O/P EST MOD 30 MIN: CPT | Performed by: NURSE PRACTITIONER

## 2024-06-18 RX ORDER — BISMUTH SUBSALICYLATE 262 MG
524 TABLET,CHEWABLE ORAL 4 TIMES DAILY
Qty: 112 TABLET | Refills: 2 | Status: SHIPPED | OUTPATIENT
Start: 2024-06-18

## 2024-06-25 DIAGNOSIS — K58.0 IRRITABLE BOWEL SYNDROME WITH DIARRHEA: ICD-10-CM

## 2024-06-25 RX ORDER — BUDESONIDE 3 MG/1
9 CAPSULE, COATED PELLETS ORAL DAILY
Qty: 90 CAPSULE | Refills: 0 | Status: SHIPPED | OUTPATIENT
Start: 2024-06-25 | End: 2024-07-25

## 2024-06-25 RX ORDER — BUDESONIDE 3 MG/1
9 CAPSULE, COATED PELLETS ORAL EVERY MORNING
Qty: 270 CAPSULE | OUTPATIENT
Start: 2024-06-25

## 2024-06-25 NOTE — TELEPHONE ENCOUNTER
Patient requested Budesonide refill since he is changing to 3 tablets per day for 6 weeks.    I have pended medication. Please verify and send to pharmacy.

## 2024-07-09 ENCOUNTER — TELEPHONE (OUTPATIENT)
Dept: GASTROENTEROLOGY | Facility: CLINIC | Age: 34
End: 2024-07-09
Payer: COMMERCIAL

## 2024-07-09 ENCOUNTER — TELEPHONE (OUTPATIENT)
Dept: FAMILY MEDICINE CLINIC | Facility: CLINIC | Age: 34
End: 2024-07-09

## 2024-07-09 ENCOUNTER — OFFICE VISIT (OUTPATIENT)
Dept: FAMILY MEDICINE CLINIC | Facility: CLINIC | Age: 34
End: 2024-07-09
Payer: COMMERCIAL

## 2024-07-09 VITALS
SYSTOLIC BLOOD PRESSURE: 106 MMHG | DIASTOLIC BLOOD PRESSURE: 67 MMHG | HEART RATE: 79 BPM | HEIGHT: 74 IN | OXYGEN SATURATION: 97 % | BODY MASS INDEX: 40.43 KG/M2 | WEIGHT: 315 LBS | TEMPERATURE: 98.4 F

## 2024-07-09 DIAGNOSIS — M79.7 FIBROMYALGIA: ICD-10-CM

## 2024-07-09 DIAGNOSIS — M51.35 DDD (DEGENERATIVE DISC DISEASE), THORACOLUMBAR: ICD-10-CM

## 2024-07-09 DIAGNOSIS — N52.9 ERECTILE DYSFUNCTION, UNSPECIFIED ERECTILE DYSFUNCTION TYPE: ICD-10-CM

## 2024-07-09 DIAGNOSIS — E66.01 CLASS 3 SEVERE OBESITY DUE TO EXCESS CALORIES WITH SERIOUS COMORBIDITY AND BODY MASS INDEX (BMI) OF 40.0 TO 44.9 IN ADULT: Primary | ICD-10-CM

## 2024-07-09 DIAGNOSIS — Z83.79 FH: CROHN'S DISEASE: ICD-10-CM

## 2024-07-09 DIAGNOSIS — R15.2 FECAL URGENCY: ICD-10-CM

## 2024-07-09 DIAGNOSIS — M47.812 CERVICAL SPONDYLOSIS: ICD-10-CM

## 2024-07-09 DIAGNOSIS — K58.2 IRRITABLE BOWEL SYNDROME WITH BOTH CONSTIPATION AND DIARRHEA: ICD-10-CM

## 2024-07-09 DIAGNOSIS — K52.831 COLLAGENOUS COLITIS: ICD-10-CM

## 2024-07-09 DIAGNOSIS — K58.0 IRRITABLE BOWEL SYNDROME WITH DIARRHEA: Primary | ICD-10-CM

## 2024-07-09 PROCEDURE — 99214 OFFICE O/P EST MOD 30 MIN: CPT | Performed by: NURSE PRACTITIONER

## 2024-07-09 RX ORDER — PREGABALIN 100 MG/1
100 CAPSULE ORAL 2 TIMES DAILY
Qty: 60 CAPSULE | Refills: 1 | Status: SHIPPED | OUTPATIENT
Start: 2024-07-09

## 2024-07-09 RX ORDER — BISMUTH SUBSALICYLATE 262 MG
524 TABLET,CHEWABLE ORAL 4 TIMES DAILY
Qty: 360 TABLET | Refills: 3 | Status: SHIPPED | OUTPATIENT
Start: 2024-07-09

## 2024-07-09 RX ORDER — SEMAGLUTIDE 0.25 MG/.5ML
0.25 INJECTION, SOLUTION SUBCUTANEOUS WEEKLY
Qty: 2 ML | Refills: 1 | Status: SHIPPED | OUTPATIENT
Start: 2024-07-09

## 2024-07-09 RX ORDER — TADALAFIL 10 MG/1
10 TABLET ORAL DAILY PRN
Qty: 90 TABLET | Refills: 1 | Status: SHIPPED | OUTPATIENT
Start: 2024-07-09

## 2024-07-09 RX ORDER — SEMAGLUTIDE 0.5 MG/.5ML
0.5 INJECTION, SOLUTION SUBCUTANEOUS WEEKLY
Qty: 2 ML | Refills: 1 | Status: SHIPPED | OUTPATIENT
Start: 2024-08-09

## 2024-07-09 NOTE — ASSESSMENT & PLAN NOTE
Patient advice:  Recommend at least one hour moderate exercise daily  Recommend eliminate liquid calories; water should be primary beverage  Recommend eliminate snacks between meals  Be mindful of portion size  Whole foods in general are better than processed foods (e.g., fast food)   Advised to increase protein intake to  gm a day.

## 2024-07-09 NOTE — PROGRESS NOTES
Chief Complaint  Stomach issues     Subjective          Thai Ball is a 33 y.o. male who presents to [facility]    History of Present Illness    Complains of stomach acting up last week on Wednesday and Thursday.  ED: would like to increase dose of Cialis and his testosterone checked.  Has lost 14 pounds since April. Took Wegovy 1 mg and had extreme stomach pain.   Taking Budesonide 9 mg for 6 weeks, GI wanted him to lower it and he did to 6 mg then started having issues with his stomach again.    PHQ-2 Total Score:     PHQ-9 Total Score:          Review of Systems       Medical History: has a past medical history of Chronic pain, Coronary artery disease (Jan 2022), Depression, Fibromyalgia, Hernia (July 2023), Irritable bowel syndrome (Jan 2022), Kidney stone, Migraine, Nausea and vomiting (02/08/2023), and Vitamin D deficiency.     Surgical History: has a past surgical history that includes Neck surgery; Esophagogastroduodenoscopy (N/A, 5/25/2023); Colonoscopy (N/A, 5/25/2023); Cholecystectomy (N/A, 12/6/2023); Esophagogastroduodenoscopy (N/A, 5/17/2024); and Colonoscopy (N/A, 5/17/2024).     Family History: family history includes Inflammatory bowel disease in his mother.     Social History: reports that he has never smoked. He has never been exposed to tobacco smoke. He has quit using smokeless tobacco.  His smokeless tobacco use included chew. He reports current drug use. Drug: Marijuana. He reports that he does not drink alcohol.    Allergies: Compazine [prochlorperazine] and Reglan [metoclopramide]      Health Maintenance Due   Topic Date Due    ANNUAL PHYSICAL  Never done    COVID-19 Vaccine (3 - 2023-24 season) 09/01/2023            Current Outpatient Medications:     albuterol sulfate  (90 Base) MCG/ACT inhaler, Inhale 2 puffs Every 6 (Six) Hours As Needed for Wheezing or Shortness of Air., Disp: 18 g, Rfl: 0    baclofen (LIORESAL) 10 MG tablet, Take 1 tablet  by mouth 3 (Three) Times a Day., Disp: , Rfl:     Bismuth 262 MG chewable tablet, Chew 2 tablets 4 (Four) Times a Day., Disp: 360 tablet, Rfl: 3    Blood Pressure Monitor device, Use 1 Device Daily., Disp: 1 each, Rfl: 0    Budesonide (ENTOCORT EC) 3 MG 24 hr capsule, Take 3 capsules by mouth Every Morning. For 6 weeks then 2 capsules every morning for 6 weeks the 1 capsule every morning for 6 weeks, Disp: 90 capsule, Rfl: 2    colestipol (COLESTID) 1 g tablet, TAKE 1-2 TABLETS BY MOUTH 2 (TWO) TIMES A DAY., Disp: 120 tablet, Rfl: 1    dicyclomine (BENTYL) 20 MG tablet, Take 1 tablet by mouth Every 6 (Six) Hours., Disp: 360 tablet, Rfl: 1    DULoxetine (CYMBALTA) 60 MG capsule, duloxetine 60 mg capsule,delayed release, Disp: , Rfl:     galcanezumab-gnlm (Emgality) 120 MG/ML auto-injector pen, Emgality Pen 120 mg/mL subcutaneous pen injector  ADMINISTER 1 ML UNDER THE SKIN EVERY MONTH AS DIRECTED (90 day supply), Disp: , Rfl:     hydrOXYzine (ATARAX) 50 MG tablet, Take 1 tablet by mouth Every 8 (Eight) Hours As Needed for Anxiety., Disp: 270 tablet, Rfl: 1    meloxicam (MOBIC) 15 MG tablet, , Disp: , Rfl:     pantoprazole (PROTONIX) 40 MG EC tablet, Take 1 tablet by mouth 2 (Two) Times a Day., Disp: 180 tablet, Rfl: 0    tadalafil (CIALIS) 10 MG tablet, Take 1 tablet by mouth Daily As Needed for Erectile Dysfunction., Disp: 90 tablet, Rfl: 1    traMADol (ULTRAM) 50 MG tablet, Take 1 tablet by mouth Every 8 (Eight) Hours As Needed for Moderate Pain., Disp: 270 tablet, Rfl: 1    Ubrelvy 100 MG tablet, Take 1 tablet by mouth 1 (One) Time As Needed., Disp: , Rfl:     zolpidem (AMBIEN) 5 MG tablet, , Disp: , Rfl:     pregabalin (LYRICA) 100 MG capsule, Take 1 capsule by mouth 2 (Two) Times a Day., Disp: 60 capsule, Rfl: 1    Semaglutide-Weight Management (Wegovy) 0.25 MG/0.5ML solution auto-injector, Inject 0.5 mL under the skin into the appropriate area as directed 1 (One) Time Per Week., Disp: 2 mL, Rfl: 1    [START ON  "8/9/2024] Semaglutide-Weight Management (Wegovy) 0.5 MG/0.5ML solution auto-injector, Inject 0.5 mL under the skin into the appropriate area as directed 1 (One) Time Per Week., Disp: 2 mL, Rfl: 1      Immunization History   Administered Date(s) Administered    COVID-19 (UNSPECIFIED) 05/25/2021, 06/15/2021    Fluzone (or Fluarix & Flulaval for VFC) >6mos 01/13/2023    Influenza Injectable Mdck Pf Quad 01/13/2023    TD Preservative Free (Tenivac) 01/13/2023         Objective       Vitals:    07/09/24 1320   BP: 106/67   BP Location: Left arm   Patient Position: Sitting   Cuff Size: Adult   Pulse: 79   Temp: 98.4 °F (36.9 °C)   TempSrc: Temporal   SpO2: 97%   Weight: (!) 156 kg (344 lb 9.6 oz)   Height: 188 cm (74.02\")   PainSc:   5   PainLoc: Abdomen      Body mass index is 44.22 kg/m².   Wt Readings from Last 3 Encounters:   07/09/24 (!) 156 kg (344 lb 9.6 oz)   06/18/24 (!) 157 kg (346 lb 14.4 oz)   05/21/24 (!) 156 kg (343 lb 6.4 oz)      BP Readings from Last 3 Encounters:   07/09/24 106/67   06/18/24 123/83   05/21/24 104/69                Physical Exam  Vitals reviewed.   Constitutional:       Appearance: Normal appearance.   Cardiovascular:      Rate and Rhythm: Normal rate and regular rhythm.   Abdominal:      General: Bowel sounds are normal.      Palpations: Abdomen is soft.   Skin:     General: Skin is warm and dry.   Neurological:      Mental Status: He is alert and oriented to person, place, and time.   Psychiatric:         Mood and Affect: Mood normal.         Behavior: Behavior normal.         Thought Content: Thought content normal.         Judgment: Judgment normal.             Result Review :       Common labs          2/14/2024    09:22 3/20/2024    14:50 5/21/2024    07:57   Common Labs   Glucose 96  87     BUN 12  11     Creatinine 0.75  0.72     Sodium 138  139     Potassium 4.2  4.5     Chloride 104  102     Calcium 8.8  8.8     Albumin 4.2  3.7     Total Bilirubin 0.6  1.0     Alkaline " Phosphatase 125  109     AST (SGOT) 37  530     ALT (SGPT) 65  212     WBC 11.46  11.83  11.31    Hemoglobin 15.0  15.4  15.5    Hematocrit 44.6  46.0  46.5    Platelets 284  272  185    Hemoglobin A1C   5.60    Uric Acid   6.9                       Assessment and Plan        Diagnoses and all orders for this visit:    1. Class 3 severe obesity due to excess calories with serious comorbidity and body mass index (BMI) of 40.0 to 44.9 in adult (Primary)  Comments:  Start Wegovy 0.25 mg weekly for 4 weeks then increase to 0.5 mg for four then return to office.  Assessment & Plan:  Patient advice:  Recommend at least one hour moderate exercise daily  Recommend eliminate liquid calories; water should be primary beverage  Recommend eliminate snacks between meals  Be mindful of portion size  Whole foods in general are better than processed foods (e.g., fast food)   Advised to increase protein intake to  gm a day.        Orders:  -     Semaglutide-Weight Management (Wegovy) 0.25 MG/0.5ML solution auto-injector; Inject 0.5 mL under the skin into the appropriate area as directed 1 (One) Time Per Week.  Dispense: 2 mL; Refill: 1  -     Semaglutide-Weight Management (Wegovy) 0.5 MG/0.5ML solution auto-injector; Inject 0.5 mL under the skin into the appropriate area as directed 1 (One) Time Per Week.  Dispense: 2 mL; Refill: 1    2. Erectile dysfunction, unspecified erectile dysfunction type  Comments:  Increase Cialis 10 mg  Orders:  -     tadalafil (CIALIS) 10 MG tablet; Take 1 tablet by mouth Daily As Needed for Erectile Dysfunction.  Dispense: 90 tablet; Refill: 1  -     Testosterone, Free, Total; Future    3. Irritable bowel syndrome with both constipation and diarrhea  -     Bismuth 262 MG chewable tablet; Chew 2 tablets 4 (Four) Times a Day.  Dispense: 360 tablet; Refill: 3    4. DDD (degenerative disc disease), thoracolumbar  Comments:  Stop gabapentin and start Lyrica 100 mg bid  Orders:  -     pregabalin (LYRICA)  100 MG capsule; Take 1 capsule by mouth 2 (Two) Times a Day.  Dispense: 60 capsule; Refill: 1    5. Cervical spondylosis  -     pregabalin (LYRICA) 100 MG capsule; Take 1 capsule by mouth 2 (Two) Times a Day.  Dispense: 60 capsule; Refill: 1    6. Fibromyalgia  -     pregabalin (LYRICA) 100 MG capsule; Take 1 capsule by mouth 2 (Two) Times a Day.  Dispense: 60 capsule; Refill: 1          Follow Up     Return in about 4 weeks (around 8/6/2024), or if symptoms worsen or fail to improve.    Patient was given instructions and counseling regarding his condition or for health maintenance advice. Please see specific information pulled into the AVS if appropriate. Patient understands the importance of having any ordered tests to be performed in a timely fashion.      I spent 25 minutes caring for Thai on this date of service. This time includes time spent by me in the following activities: preparing for the visit, reviewing tests, performing a medically appropriate examination and/or evaluation, counseling and educating the patient/family/caregiver, referring and communicating with other health care professionals, ordering medications, and ordering test(s)      PILLO Haines

## 2024-07-09 NOTE — TELEPHONE ENCOUNTER
----- Message from [facility] [email or web address] sent at 7/3/2024  4:18 PM EDT -----  Regarding: Stomach  Contact: [phone]  Okay. Ill look into it.  Today has heen one of my worst flares rock had in quite sometime. I took bentyl twice today. Tried immodium later then tried pepto a couple hours ago and my stomach feels like its on fire inside. The diarrhea stopped but i keep having to go to the bathroom and when i do only drops come out. Im just stuck in miserable pain. Did i do something wrong or do you have any advice to help?

## 2024-07-09 NOTE — TELEPHONE ENCOUNTER
I spoke to patient today and informed him of labs to complete. Also encouraged him to maintain the CT Enterography scheduled on 7/25/24. Patient stated the only thing he is taking is Excedrin Migraine about 2x a week. He stated he is feeling better today and is taking Budesonide 3 capsules per day right now. He injured his leg last night bowling. As soon as he is able he will complete labs.

## 2024-07-09 NOTE — TELEPHONE ENCOUNTER
Please call and assess the patients current status. What dose of budesonide is he currently on? Ensure that he is avoiding NSAIDs.

## 2024-07-16 ENCOUNTER — LAB (OUTPATIENT)
Dept: LAB | Facility: HOSPITAL | Age: 34
End: 2024-07-16
Payer: COMMERCIAL

## 2024-07-16 ENCOUNTER — TELEPHONE (OUTPATIENT)
Dept: FAMILY MEDICINE CLINIC | Facility: CLINIC | Age: 34
End: 2024-07-16
Payer: COMMERCIAL

## 2024-07-16 DIAGNOSIS — K58.0 IRRITABLE BOWEL SYNDROME WITH DIARRHEA: ICD-10-CM

## 2024-07-16 DIAGNOSIS — K52.831 COLLAGENOUS COLITIS: ICD-10-CM

## 2024-07-16 DIAGNOSIS — N52.9 ERECTILE DYSFUNCTION, UNSPECIFIED ERECTILE DYSFUNCTION TYPE: ICD-10-CM

## 2024-07-16 DIAGNOSIS — Z83.79 FH: CROHN'S DISEASE: ICD-10-CM

## 2024-07-16 DIAGNOSIS — R73.03 PREDIABETES: ICD-10-CM

## 2024-07-16 DIAGNOSIS — R61 DIAPHORESIS: ICD-10-CM

## 2024-07-16 DIAGNOSIS — R15.2 FECAL URGENCY: ICD-10-CM

## 2024-07-16 LAB
ALBUMIN SERPL-MCNC: 4.1 G/DL (ref 3.5–5.2)
ALBUMIN/GLOB SERPL: 1.3 G/DL
ALP SERPL-CCNC: 108 U/L (ref 39–117)
ALT SERPL W P-5'-P-CCNC: 42 U/L (ref 1–41)
ANION GAP SERPL CALCULATED.3IONS-SCNC: 12 MMOL/L (ref 5–15)
AST SERPL-CCNC: 21 U/L (ref 1–40)
BILIRUB SERPL-MCNC: 0.7 MG/DL (ref 0–1.2)
BUN SERPL-MCNC: 19 MG/DL (ref 6–20)
BUN/CREAT SERPL: 24.1 (ref 7–25)
CALCIUM SPEC-SCNC: 8.9 MG/DL (ref 8.6–10.5)
CERULOPLASMIN SERPL-MCNC: 25 MG/DL (ref 16–31)
CHLORIDE SERPL-SCNC: 105 MMOL/L (ref 98–107)
CO2 SERPL-SCNC: 22 MMOL/L (ref 22–29)
CREAT SERPL-MCNC: 0.79 MG/DL (ref 0.76–1.27)
CRP SERPL-MCNC: 1.02 MG/DL (ref 0–0.5)
EGFRCR SERPLBLD CKD-EPI 2021: 121.8 ML/MIN/1.73
ERYTHROCYTE [SEDIMENTATION RATE] IN BLOOD: 20 MM/HR (ref 0–15)
FERRITIN SERPL-MCNC: 119 NG/ML (ref 30–400)
GLOBULIN UR ELPH-MCNC: 3.2 GM/DL
GLUCOSE SERPL-MCNC: 99 MG/DL (ref 65–99)
HAV IGM SERPL QL IA: NORMAL
HBV CORE IGM SERPL QL IA: NORMAL
HBV SURFACE AG SERPL QL IA: NORMAL
HCV AB SER QL: NORMAL
INR PPP: 0.95 (ref 0.86–1.15)
IRON 24H UR-MRATE: 84 MCG/DL (ref 59–158)
IRON SATN MFR SERPL: 22 % (ref 20–50)
POTASSIUM SERPL-SCNC: 3.8 MMOL/L (ref 3.5–5.2)
PROT SERPL-MCNC: 7.3 G/DL (ref 6–8.5)
PROTHROMBIN TIME: 12.9 SECONDS (ref 11.8–14.9)
SODIUM SERPL-SCNC: 139 MMOL/L (ref 136–145)
TIBC SERPL-MCNC: 390 MCG/DL (ref 298–536)
TRANSFERRIN SERPL-MCNC: 262 MG/DL (ref 200–360)

## 2024-07-16 PROCEDURE — 84403 ASSAY OF TOTAL TESTOSTERONE: CPT

## 2024-07-16 PROCEDURE — 84402 ASSAY OF FREE TESTOSTERONE: CPT

## 2024-07-16 PROCEDURE — 85610 PROTHROMBIN TIME: CPT

## 2024-07-16 PROCEDURE — 82390 ASSAY OF CERULOPLASMIN: CPT

## 2024-07-16 PROCEDURE — 86003 ALLG SPEC IGE CRUDE XTRC EA: CPT

## 2024-07-16 PROCEDURE — 84466 ASSAY OF TRANSFERRIN: CPT

## 2024-07-16 PROCEDURE — 82728 ASSAY OF FERRITIN: CPT

## 2024-07-16 PROCEDURE — 80053 COMPREHEN METABOLIC PANEL: CPT

## 2024-07-16 PROCEDURE — 86140 C-REACTIVE PROTEIN: CPT

## 2024-07-16 PROCEDURE — 83540 ASSAY OF IRON: CPT

## 2024-07-16 PROCEDURE — 82785 ASSAY OF IGE: CPT

## 2024-07-16 PROCEDURE — 82104 ALPHA-1-ANTITRYPSIN PHENO: CPT

## 2024-07-16 PROCEDURE — 36415 COLL VENOUS BLD VENIPUNCTURE: CPT

## 2024-07-16 PROCEDURE — 86381 MITOCHONDRIAL ANTIBODY EACH: CPT

## 2024-07-16 PROCEDURE — 86038 ANTINUCLEAR ANTIBODIES: CPT

## 2024-07-16 PROCEDURE — 80074 ACUTE HEPATITIS PANEL: CPT

## 2024-07-16 PROCEDURE — 86015 ACTIN ANTIBODY EACH: CPT

## 2024-07-16 PROCEDURE — 82103 ALPHA-1-ANTITRYPSIN TOTAL: CPT

## 2024-07-16 PROCEDURE — 85652 RBC SED RATE AUTOMATED: CPT

## 2024-07-16 PROCEDURE — 86008 ALLG SPEC IGE RECOMB EA: CPT

## 2024-07-16 NOTE — TELEPHONE ENCOUNTER
Called pt and and he had some questions on his upcoming test that he on the 19 th and let him know that someone from the hospital should be calling him sometime this week to give him instructions. Pt thanked and verbalized understanding.

## 2024-07-16 NOTE — TELEPHONE ENCOUNTER
Caller: Thai Ball    Relationship: Self    Best call back number: [phone]     What is the best time to reach you: ANY    Who are you requesting to speak with (clinical staff, provider,  specific staff member): JACKIE    What was the call regarding: PATIENT WOULD LIKE TO SPEAK WITH JACKIE REGARDING HIS UPCOMING PROCEDURE ON 7/19/24

## 2024-07-17 LAB
ANA SER QL: NEGATIVE
MITOCHONDRIA M2 IGG SER-ACNC: <20 UNITS (ref 0–20)
SMA IGG SER-ACNC: 4 UNITS (ref 0–19)

## 2024-07-18 LAB
TESTOST FREE SERPL-MCNC: 2 PG/ML (ref 8.7–25.1)
TESTOST SERPL-MCNC: 235 NG/DL (ref 264–916)

## 2024-07-19 LAB
ALPHA-GAL IGE QN: <0.1 KU/L
BEEF IGE QN: <0.1 KU/L
CONV CLASS DESCRIPTION: NORMAL
IGE SERPL-ACNC: 7 IU/ML (ref 6–495)
LAMB IGE QN: <0.1 KU/L
PORK IGE QN: <0.1 KU/L

## 2024-07-22 LAB
A1AT PHENOTYP SERPL IFE: NORMAL
A1AT SERPL-MCNC: 161 MG/DL (ref 95–164)

## 2024-07-23 ENCOUNTER — CONSULT (OUTPATIENT)
Dept: ONCOLOGY | Facility: HOSPITAL | Age: 34
End: 2024-07-23
Payer: COMMERCIAL

## 2024-07-23 ENCOUNTER — LAB (OUTPATIENT)
Dept: ONCOLOGY | Facility: HOSPITAL | Age: 34
End: 2024-07-23
Payer: COMMERCIAL

## 2024-07-23 VITALS
WEIGHT: 315 LBS | SYSTOLIC BLOOD PRESSURE: 119 MMHG | BODY MASS INDEX: 40.43 KG/M2 | DIASTOLIC BLOOD PRESSURE: 74 MMHG | RESPIRATION RATE: 18 BRPM | TEMPERATURE: 97.9 F | HEART RATE: 78 BPM | OXYGEN SATURATION: 97 % | HEIGHT: 74 IN

## 2024-07-23 DIAGNOSIS — D72.829 LEUKOCYTOSIS, UNSPECIFIED TYPE: Primary | ICD-10-CM

## 2024-07-23 DIAGNOSIS — K58.0 IRRITABLE BOWEL SYNDROME WITH DIARRHEA: ICD-10-CM

## 2024-07-23 PROCEDURE — G0463 HOSPITAL OUTPT CLINIC VISIT: HCPCS | Performed by: NURSE PRACTITIONER

## 2024-07-23 PROCEDURE — 99214 OFFICE O/P EST MOD 30 MIN: CPT | Performed by: NURSE PRACTITIONER

## 2024-07-23 RX ORDER — MIRTAZAPINE 15 MG/1
15 TABLET, ORALLY DISINTEGRATING ORAL NIGHTLY
COMMUNITY
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
## 2024-07-31 ENCOUNTER — TELEPHONE (OUTPATIENT)
Dept: FAMILY MEDICINE CLINIC | Facility: CLINIC | Age: 34
End: 2024-07-31

## 2024-07-31 ENCOUNTER — OFFICE VISIT (OUTPATIENT)
Dept: FAMILY MEDICINE CLINIC | Facility: CLINIC | Age: 34
End: 2024-07-31
Payer: COMMERCIAL

## 2024-07-31 VITALS
SYSTOLIC BLOOD PRESSURE: 129 MMHG | WEIGHT: 315 LBS | BODY MASS INDEX: 40.43 KG/M2 | TEMPERATURE: 98.4 F | DIASTOLIC BLOOD PRESSURE: 80 MMHG | OXYGEN SATURATION: 98 % | HEIGHT: 74 IN | HEART RATE: 71 BPM

## 2024-07-31 DIAGNOSIS — M25.551 BILATERAL HIP PAIN: ICD-10-CM

## 2024-07-31 DIAGNOSIS — K58.0 IRRITABLE BOWEL SYNDROME WITH DIARRHEA: ICD-10-CM

## 2024-07-31 DIAGNOSIS — L08.9 STAPH SKIN INFECTION: ICD-10-CM

## 2024-07-31 DIAGNOSIS — R19.8 TENESMUS: Primary | ICD-10-CM

## 2024-07-31 DIAGNOSIS — B95.8 STAPH SKIN INFECTION: ICD-10-CM

## 2024-07-31 DIAGNOSIS — M25.552 BILATERAL HIP PAIN: ICD-10-CM

## 2024-07-31 DIAGNOSIS — R79.89 LOW TESTOSTERONE IN MALE: ICD-10-CM

## 2024-07-31 PROCEDURE — 99214 OFFICE O/P EST MOD 30 MIN: CPT | Performed by: NURSE PRACTITIONER

## 2024-07-31 RX ORDER — BUDESONIDE 28 MG/1
2 AEROSOL, FOAM RECTAL 2 TIMES DAILY
Qty: 100.2 G | Refills: 1 | Status: SHIPPED | OUTPATIENT
Start: 2024-07-31

## 2024-07-31 RX ORDER — DICYCLOMINE HCL 20 MG
20 TABLET ORAL EVERY 6 HOURS PRN
Qty: 360 TABLET | Refills: 1 | Status: SHIPPED | OUTPATIENT
Start: 2024-07-31

## 2024-07-31 RX ORDER — TESTOSTERONE CYPIONATE 200 MG/ML
200 INJECTION, SOLUTION INTRAMUSCULAR
Qty: 2 ML | Refills: 0 | Status: SHIPPED | OUTPATIENT
Start: 2024-07-31

## 2024-07-31 RX ORDER — TRAMADOL HYDROCHLORIDE 50 MG/1
50 TABLET ORAL EVERY 8 HOURS PRN
Qty: 270 TABLET | Refills: 1 | Status: SHIPPED | OUTPATIENT
Start: 2024-07-31

## 2024-07-31 RX ORDER — AMOXICILLIN 500 MG/1
500 TABLET, FILM COATED ORAL 2 TIMES DAILY
Qty: 20 TABLET | Refills: 0 | Status: SHIPPED | OUTPATIENT
Start: 2024-07-31 | End: 2024-08-10

## 2024-07-31 NOTE — TELEPHONE ENCOUNTER
Caller: MIREYA NGUYEN    Relationship to patient: Emergency Contact    Best call back number: [phone]    Patient is needing: PATIENTS WIFE CALLED STATING THE PATIENT WAS SEEN TODAY AND PRESCRIBED AMOXICILLIN BUT [facility] IS OUT OF THIS MEDICATION UNTIL FRIDAY. PATIENT IS WANTING TO KNOW IF IT IS OKAY TO WAIT UNTIL FRIDAY TO START TAKING THIS MEDICATION OR IF HE SHOULD TRY AND FIND IT IN STOCK AT A DIFFERENT PHARMACY.

## 2024-07-31 NOTE — PROGRESS NOTES
Chief Complaint  Follow-up (Testosterone )    Subjective          Thai Ball is a 33 y.o. male who presents to [facility]    History of Present Illness    Tenesmus: happens daily, always feels like something going to fall out of butt.    Has started lifting weights at the gym, noticed a rash inter elbow    Testosterone level is low at 235.  He would like to start infection    PHQ-2 Total Score:     PHQ-9 Total Score:          Review of Systems       Medical History: has a past medical history of Chronic pain, Coronary artery disease (Jan 2022), Depression, Fibromyalgia, Hernia (July 2023), Irritable bowel syndrome (Jan 2022), Kidney stone, Migraine, Nausea and vomiting (02/08/2023), and Vitamin D deficiency.     Surgical History: has a past surgical history that includes Neck surgery; Esophagogastroduodenoscopy (N/A, 5/25/2023); Colonoscopy (N/A, 5/25/2023); Cholecystectomy (N/A, 12/6/2023); Esophagogastroduodenoscopy (N/A, 5/17/2024); and Colonoscopy (N/A, 5/17/2024).     Family History: family history includes Inflammatory bowel disease in his mother.     Social History: reports that he has never smoked. He has never been exposed to tobacco smoke. He has quit using smokeless tobacco.  His smokeless tobacco use included chew. He reports current drug use. Drug: Marijuana. He reports that he does not drink alcohol.    Allergies: Compazine [prochlorperazine] and Reglan [metoclopramide]      Health Maintenance Due   Topic Date Due    ANNUAL PHYSICAL  Never done    COVID-19 Vaccine (3 - 2023-24 season) 09/01/2023            Current Outpatient Medications:     albuterol sulfate  (90 Base) MCG/ACT inhaler, Inhale 2 puffs Every 6 (Six) Hours As Needed for Wheezing or Shortness of Air., Disp: 18 g, Rfl: 0    baclofen (LIORESAL) 10 MG tablet, Take 1 tablet by mouth 3 (Three) Times a Day., Disp: , Rfl:     Bismuth 262 MG chewable tablet, Chew 2 tablets 4 (Four) Times a Day.,  Disp: 360 tablet, Rfl: 3    Blood Pressure Monitor device, Use 1 Device Daily., Disp: 1 each, Rfl: 0    Budesonide (ENTOCORT EC) 3 MG 24 hr capsule, Take 3 capsules by mouth Every Morning. For 6 weeks then 2 capsules every morning for 6 weeks the 1 capsule every morning for 6 weeks, Disp: 90 capsule, Rfl: 2    colestipol (COLESTID) 1 g tablet, TAKE 1-2 TABLETS BY MOUTH 2 (TWO) TIMES A DAY., Disp: 120 tablet, Rfl: 1    dicyclomine (BENTYL) 20 MG tablet, TAKE 1 TABLET BY MOUTH EVERY 6 HOURS AS NEEDED, Disp: 360 tablet, Rfl: 1    DULoxetine (CYMBALTA) 60 MG capsule, duloxetine 60 mg capsule,delayed release, Disp: , Rfl:     galcanezumab-gnlm (Emgality) 120 MG/ML auto-injector pen, Emgality Pen 120 mg/mL subcutaneous pen injector  ADMINISTER 1 ML UNDER THE SKIN EVERY MONTH AS DIRECTED (90 day supply), Disp: , Rfl:     hydrOXYzine (ATARAX) 50 MG tablet, Take 1 tablet by mouth Every 8 (Eight) Hours As Needed for Anxiety., Disp: 270 tablet, Rfl: 1    meloxicam (MOBIC) 15 MG tablet, , Disp: , Rfl:     mirtazapine (REMERON SOL-TAB) 15 MG disintegrating tablet, Place 1 tablet on the tongue Every Night., Disp: , Rfl:     pantoprazole (PROTONIX) 40 MG EC tablet, Take 1 tablet by mouth 2 (Two) Times a Day., Disp: 180 tablet, Rfl: 0    pregabalin (LYRICA) 100 MG capsule, Take 1 capsule by mouth 2 (Two) Times a Day., Disp: 60 capsule, Rfl: 1    Semaglutide-Weight Management (Wegovy) 0.25 MG/0.5ML solution auto-injector, Inject 0.5 mL under the skin into the appropriate area as directed 1 (One) Time Per Week., Disp: 2 mL, Rfl: 1    [START ON 8/9/2024] Semaglutide-Weight Management (Wegovy) 0.5 MG/0.5ML solution auto-injector, Inject 0.5 mL under the skin into the appropriate area as directed 1 (One) Time Per Week., Disp: 2 mL, Rfl: 1    tadalafil (CIALIS) 10 MG tablet, Take 1 tablet by mouth Daily As Needed for Erectile Dysfunction., Disp: 90 tablet, Rfl: 1    traMADol (ULTRAM) 50 MG tablet, Take 1 tablet by mouth Every 8 (Eight)  "Hours As Needed for Moderate Pain., Disp: 270 tablet, Rfl: 1    Ubrelvy 100 MG tablet, Take 1 tablet by mouth 1 (One) Time As Needed., Disp: , Rfl:     amoxicillin (AMOXIL) 500 MG tablet, Take 1 tablet by mouth 2 (Two) Times a Day for 10 days., Disp: 20 tablet, Rfl: 0    Budesonide 2 MG/ACT foam, Insert 2 mg into the rectum 2 (Two) Times a Day., Disp: 100.2 g, Rfl: 1    Syringe/Needle, Disp, (Luer Lock Safety Syringes) 23G X 1\" 3 ML misc, Use 200 mg Every 14 (Fourteen) Days., Disp: 50 each, Rfl: 0    Testosterone Cypionate (Depo-Testosterone) 200 MG/ML injection, Inject 1 mL into the appropriate muscle as directed by prescriber Every 14 (Fourteen) Days., Disp: 2 mL, Rfl: 0      Immunization History   Administered Date(s) Administered    COVID-19 (UNSPECIFIED) 05/25/2021, 06/15/2021    Fluzone (or Fluarix & Flulaval for VFC) >6mos 01/13/2023    Influenza Injectable Mdck Pf Quad 01/13/2023    TD Preservative Free (Tenivac) 01/13/2023         Objective       Vitals:    07/31/24 1322   BP: 129/80   BP Location: Left arm   Patient Position: Sitting   Cuff Size: Large Adult   Pulse: 71   Temp: 98.4 °F (36.9 °C)   TempSrc: Temporal   SpO2: 98%   Weight: (!) 160 kg (352 lb 12.8 oz)   Height: 188 cm (74.02\")      Body mass index is 45.28 kg/m².   Wt Readings from Last 3 Encounters:   07/31/24 (!) 160 kg (352 lb 12.8 oz)   07/23/24 (!) 159 kg (350 lb 6.4 oz)   07/09/24 (!) 156 kg (344 lb 9.6 oz)      BP Readings from Last 3 Encounters:   07/31/24 129/80   07/23/24 119/74   07/09/24 106/67                Physical Exam  Vitals reviewed.   Constitutional:       Appearance: Normal appearance.   Skin:     General: Skin is warm and dry.      Comments: Bilateral antecubital area with red papular rash with white centers noted.   Neurological:      Mental Status: He is alert and oriented to person, place, and time.   Psychiatric:         Mood and Affect: Mood normal.         Behavior: Behavior normal.         Thought Content: Thought " "content normal.         Judgment: Judgment normal.             Result Review :       Common labs          3/20/2024    14:50 5/21/2024    07:57 7/16/2024    09:13   Common Labs   Glucose 87   99    BUN 11   19    Creatinine 0.72   0.79    Sodium 139   139    Potassium 4.5   3.8    Chloride 102   105    Calcium 8.8   8.9    Albumin 3.7   4.1    Total Bilirubin 1.0   0.7    Alkaline Phosphatase 109   108    AST (SGOT) 530   21    ALT (SGPT) 212   42    WBC 11.83  11.31     Hemoglobin 15.4  15.5     Hematocrit 46.0  46.5     Platelets 272  185     Hemoglobin A1C  5.60     Uric Acid  6.9                        Assessment and Plan        Diagnoses and all orders for this visit:    1. Tenesmus (Primary)  -     Budesonide 2 MG/ACT foam; Insert 2 mg into the rectum 2 (Two) Times a Day.  Dispense: 100.2 g; Refill: 1    2. Staph skin infection  -     amoxicillin (AMOXIL) 500 MG tablet; Take 1 tablet by mouth 2 (Two) Times a Day for 10 days.  Dispense: 20 tablet; Refill: 0    3. Low testosterone in male  -     Testosterone Cypionate (Depo-Testosterone) 200 MG/ML injection; Inject 1 mL into the appropriate muscle as directed by prescriber Every 14 (Fourteen) Days.  Dispense: 2 mL; Refill: 0  -     Syringe/Needle, Disp, (Luer Lock Safety Syringes) 23G X 1\" 3 ML misc; Use 200 mg Every 14 (Fourteen) Days.  Dispense: 50 each; Refill: 0  -     CBC & Differential; Future  -     Testosterone; Future  -     Estradiol; Future    4. Bilateral hip pain  Comments:  GUI reviewed and UDS updated.  Orders:  -     traMADol (ULTRAM) 50 MG tablet; Take 1 tablet by mouth Every 8 (Eight) Hours As Needed for Moderate Pain.  Dispense: 270 tablet; Refill: 1          Follow Up     Return in about 3 months (around 10/31/2024).    Patient was given instructions and counseling regarding his condition or for health maintenance advice. Please see specific information pulled into the AVS if appropriate. Patient understands the importance of having any " ordered tests to be performed in a timely fashion.      I spent 20 minutes caring for Thai on this date of service. This time includes time spent by me in the following activities: preparing for the visit, reviewing tests, performing a medically appropriate examination and/or evaluation, counseling and educating the patient/family/caregiver, referring and communicating with other health care professionals, documenting information in the medical record, independently interpreting results and communicating that information with the patient/family/caregiver, ordering medications, and ordering test(s)      PILLO Haines

## 2024-08-20 ENCOUNTER — OFFICE VISIT (OUTPATIENT)
Dept: FAMILY MEDICINE CLINIC | Facility: CLINIC | Age: 34
End: 2024-08-20
Payer: COMMERCIAL

## 2024-08-20 VITALS
OXYGEN SATURATION: 96 % | DIASTOLIC BLOOD PRESSURE: 71 MMHG | TEMPERATURE: 98.4 F | SYSTOLIC BLOOD PRESSURE: 120 MMHG | WEIGHT: 315 LBS | HEIGHT: 74 IN | HEART RATE: 81 BPM | BODY MASS INDEX: 40.43 KG/M2

## 2024-08-20 DIAGNOSIS — L08.9 INFECTION OF GREAT TOE: Primary | ICD-10-CM

## 2024-08-20 DIAGNOSIS — R79.89 LOW TESTOSTERONE IN MALE: ICD-10-CM

## 2024-08-20 PROCEDURE — 99214 OFFICE O/P EST MOD 30 MIN: CPT | Performed by: NURSE PRACTITIONER

## 2024-08-20 RX ORDER — CEPHALEXIN 500 MG/1
500 CAPSULE ORAL 3 TIMES DAILY
Qty: 30 CAPSULE | Refills: 0 | Status: SHIPPED | OUTPATIENT
Start: 2024-08-20 | End: 2024-08-30

## 2024-08-20 RX ORDER — TESTOSTERONE CYPIONATE 200 MG/ML
200 INJECTION, SOLUTION INTRAMUSCULAR
Qty: 4 ML | Refills: 0 | Status: SHIPPED | OUTPATIENT
Start: 2024-08-20

## 2024-08-23 ENCOUNTER — HOSPITAL ENCOUNTER (OUTPATIENT)
Dept: CT IMAGING | Facility: HOSPITAL | Age: 34
Discharge: HOME | End: 2024-08-23
Admitting: INTERNAL MEDICINE
Payer: COMMERCIAL

## 2024-08-23 ENCOUNTER — TELEPHONE (OUTPATIENT)
Dept: FAMILY MEDICINE CLINIC | Facility: CLINIC | Age: 34
End: 2024-08-23
Payer: COMMERCIAL

## 2024-08-23 DIAGNOSIS — R19.4 BOWEL HABIT CHANGES: ICD-10-CM

## 2024-08-23 DIAGNOSIS — Z83.79 FH: CROHN'S DISEASE: ICD-10-CM

## 2024-08-23 PROCEDURE — 74177 CT ABD & PELVIS W/CONTRAST: CPT

## 2024-08-23 PROCEDURE — 25510000001 IOPAMIDOL PER 1 ML: Performed by: INTERNAL MEDICINE

## 2024-08-23 RX ADMIN — IOPAMIDOL 100 ML: 755 INJECTION, SOLUTION INTRAVENOUS at 12:19

## 2024-08-23 NOTE — TELEPHONE ENCOUNTER
Caller: Thai Ball    Relationship: Self    Best call back number: [phone]    What is the best time to reach you: ANYTIME    Who are you requesting to speak with (clinical staff, provider,  specific staff member): AARON LERMA OR HER STAFF    Do you know the name of the person who called: PATIENT    What was the call regarding: PATIENT WAS DISCONNECTED WHILE ON HOLD TO SPEAK TO AARON'S STAFF. PLEASE CALL PATIENT BACK AND ADVISE.    Is it okay if the provider responds through MyChart: N/A

## 2024-08-24 DIAGNOSIS — K58.0 IRRITABLE BOWEL SYNDROME WITH DIARRHEA: ICD-10-CM

## 2024-08-27 RX ORDER — METHYLPREDNISOLONE 4 MG
TABLET, DOSE PACK ORAL
Qty: 21 TABLET | Refills: 0 | Status: SHIPPED | OUTPATIENT
Start: 2024-08-27

## 2024-08-29 DIAGNOSIS — D72.829 LEUKOCYTOSIS, UNSPECIFIED TYPE: Primary | ICD-10-CM

## 2024-08-29 RX ORDER — BUDESONIDE 3 MG/1
CAPSULE, COATED PELLETS ORAL
Qty: 90 CAPSULE | Refills: 2 | Status: SHIPPED | OUTPATIENT
Start: 2024-08-29

## 2024-09-08 DIAGNOSIS — K21.9 GASTROESOPHAGEAL REFLUX DISEASE WITHOUT ESOPHAGITIS: ICD-10-CM

## 2024-09-08 DIAGNOSIS — K21.9 HIATAL HERNIA WITH GERD: ICD-10-CM

## 2024-09-08 DIAGNOSIS — K44.9 HIATAL HERNIA WITH GERD: ICD-10-CM

## 2024-09-09 RX ORDER — PANTOPRAZOLE SODIUM 40 MG/1
40 TABLET, DELAYED RELEASE ORAL 2 TIMES DAILY
Qty: 180 TABLET | Refills: 0 | Status: SHIPPED | OUTPATIENT
Start: 2024-09-09
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
## 2024-09-13 ENCOUNTER — OFFICE VISIT (OUTPATIENT)
Dept: GASTROENTEROLOGY | Facility: CLINIC | Age: 34
End: 2024-09-13
Payer: MEDICARE

## 2024-09-13 VITALS
HEIGHT: 74 IN | OXYGEN SATURATION: 98 % | BODY MASS INDEX: 40.43 KG/M2 | WEIGHT: 315 LBS | SYSTOLIC BLOOD PRESSURE: 126 MMHG | HEART RATE: 69 BPM | DIASTOLIC BLOOD PRESSURE: 84 MMHG

## 2024-09-13 DIAGNOSIS — Z83.79 FH: CROHN'S DISEASE: ICD-10-CM

## 2024-09-13 DIAGNOSIS — R11.2 NAUSEA AND VOMITING, UNSPECIFIED VOMITING TYPE: ICD-10-CM

## 2024-09-13 DIAGNOSIS — K52.831 COLLAGENOUS COLITIS: ICD-10-CM

## 2024-09-13 DIAGNOSIS — R15.2 FECAL URGENCY: ICD-10-CM

## 2024-09-13 DIAGNOSIS — R19.7 DIARRHEA, UNSPECIFIED TYPE: ICD-10-CM

## 2024-09-13 DIAGNOSIS — R19.4 BOWEL HABIT CHANGES: ICD-10-CM

## 2024-09-13 DIAGNOSIS — K44.9 HIATAL HERNIA: ICD-10-CM

## 2024-09-13 DIAGNOSIS — K58.0 IRRITABLE BOWEL SYNDROME WITH DIARRHEA: Primary | ICD-10-CM

## 2024-09-13 DIAGNOSIS — K21.9 GASTROESOPHAGEAL REFLUX DISEASE, UNSPECIFIED WHETHER ESOPHAGITIS PRESENT: ICD-10-CM

## 2024-09-20 RX ORDER — BACLOFEN 10 MG/1
10 TABLET ORAL 3 TIMES DAILY
Qty: 90 TABLET | Refills: 3 | Status: SHIPPED | OUTPATIENT
Start: 2024-09-20

## 2024-09-20 RX ORDER — CELECOXIB 200 MG/1
200 CAPSULE ORAL DAILY
Qty: 90 CAPSULE | Refills: 3 | Status: SHIPPED | OUTPATIENT
Start: 2024-09-20

## 2024-09-24 RX ORDER — BACLOFEN 10 MG/1
10 TABLET ORAL 3 TIMES DAILY
Qty: 270 TABLET | Refills: 1 | OUTPATIENT
Start: 2024-09-24

## 2024-10-04 NOTE — PROGRESS NOTES
Subjective   The ABCs of the Annual Wellness Visit  Medicare Wellness Visit      Thai Ball is a 34 y.o. patient who presents for a Medicare Wellness Visit.    The following portions of the patient's history were reviewed and   updated as appropriate: allergies, current medications, past family history, past medical history, past social history, past surgical history, and problem list.    Compared to one year ago, the patient's physical   health is the same.  Compared to one year ago, the patient's mental   health is the same.    Recent Hospitalizations:  He was not admitted to the hospital during the last year.     Current Medical Providers:  Patient Care Team:  Stacy Conn APRN as PCP - General (Nurse Practitioner)  Anirudh Rogers MD as Consulting Physician (Gastroenterology)  April Zafar APRN as Nurse Practitioner (Gastroenterology)    Outpatient Medications Prior to Visit   Medication Sig Dispense Refill    albuterol sulfate  (90 Base) MCG/ACT inhaler Inhale 2 puffs Every 6 (Six) Hours As Needed for Wheezing or Shortness of Air. 18 g 0    baclofen (LIORESAL) 10 MG tablet Take 1 tablet by mouth 3 (Three) Times a Day. 90 tablet 3    Blood Pressure Monitor device Use 1 Device Daily. 1 each 0    celecoxib (CeleBREX) 200 MG capsule Take 1 capsule by mouth Daily. 90 capsule 3    dicyclomine (BENTYL) 20 MG tablet TAKE 1 TABLET BY MOUTH EVERY 6 HOURS AS NEEDED 360 tablet 1    galcanezumab-gnlm (Emgality) 120 MG/ML auto-injector pen Emgality Pen 120 mg/mL subcutaneous pen injector   ADMINISTER 1 ML UNDER THE SKIN EVERY MONTH AS DIRECTED (90 day supply)      mirtazapine (REMERON SOL-TAB) 15 MG disintegrating tablet Place 1 tablet on the tongue Every Night.      pantoprazole (PROTONIX) 40 MG EC tablet TAKE 1 TABLET BY MOUTH TWICE A  tablet 0    tadalafil (CIALIS) 10 MG tablet Take 1 tablet by mouth Daily As Needed for Erectile Dysfunction. 90 tablet 1    Testosterone Cypionate  (Depo-Testosterone) 200 MG/ML injection Inject 1 mL into the appropriate muscle as directed by prescriber Every 7 (Seven) Days. 4 mL 0    traMADol (ULTRAM) 50 MG tablet Take 1 tablet by mouth Every 8 (Eight) Hours As Needed for Moderate Pain. 270 tablet 1    Ubrelvy 100 MG tablet Take 1 tablet by mouth 1 (One) Time As Needed.      Budesonide 2 MG/ACT foam Insert 2 mg into the rectum 2 (Two) Times a Day. 100.2 g 1    DULoxetine (CYMBALTA) 60 MG capsule duloxetine 60 mg capsule,delayed release      hydrOXYzine (ATARAX) 50 MG tablet Take 1 tablet by mouth Every 8 (Eight) Hours As Needed for Anxiety. 270 tablet 1    pregabalin (LYRICA) 100 MG capsule Take 1 capsule by mouth 2 (Two) Times a Day. 60 capsule 1    Budesonide (ENTOCORT EC) 3 MG 24 hr capsule TAKE 3 CAPSULES BY MOUTH DAILY FOR 30 DAYS. 90 capsule 2    mesalamine (PENTASA) 250 MG CR capsule Take 4 capsules by mouth 4 (Four) Times a Day. (Patient not taking: Reported on 10/9/2024) 120 capsule 0     No facility-administered medications prior to visit.     Opioid medication/s are on active medication list.  and I have evaluated his active treatment plan and pain score trends (see table).  Vitals:    10/09/24 0905   PainSc:   6   PainLoc: Toe  Comment: left big toe     I have reviewed the chart for potential of high risk medication and harmful drug interactions in the elderly.        Aspirin is not on active medication list.  Aspirin use is not indicated based on review of current medical condition/s. Risk of harm outweighs potential benefits.  .    Patient Active Problem List   Diagnosis    Generalized anxiety disorder    Migraine without aura and without status migrainosus, not intractable    DDD (degenerative disc disease), thoracolumbar    Cervical spondylosis    Fibromyalgia    Hypotestosteronemia    B12 deficiency    Encounter for medical examination to establish care    Shortness of breath    Kidney stone    Urinary frequency    Recurrent epigastric  "abdominal pain    Bilateral hip pain    Abdominal pain    Diarrhea    Mesenteric lymphadenopathy    Gastroesophageal reflux disease without esophagitis    Nausea and vomiting    Blood in stool    Hiatal hernia    Irritable bowel syndrome with both constipation and diarrhea    Class 3 severe obesity due to excess calories with serious comorbidity and body mass index (BMI) of 45.0 to 49.9 in adult    Chronic cholecystitis    Elevated BP without diagnosis of hypertension    Umbilical discharge    Allergic contact dermatitis due to other agents    Abnormal laboratory test    Bowel habit changes    Gastroesophageal reflux disease    FH: Crohn's disease    Erectile dysfunction     Advance Care Planning Advance Directive is not on file.  ACP discussion was held with the patient during this visit. Patient has an advance directive (not in EMR), copy requested.            Objective   Vitals:    10/09/24 0905   BP: 115/69   Pulse: 82   SpO2: 95%   Weight: (!) 171 kg (376 lb 12.8 oz)   Height: 188 cm (74.02\")   PainSc:   6   PainLoc: Toe  Comment: left big toe       Estimated body mass index is 48.35 kg/m² as calculated from the following:    Height as of this encounter: 188 cm (74.02\").    Weight as of this encounter: 171 kg (376 lb 12.8 oz).    Class 3 Severe Obesity (BMI >=40). Obesity-related health conditions include the following: obstructive sleep apnea, hypertension, and GERD. Obesity is newly identified. BMI is is above average; BMI management plan is completed. We discussed portion control and increasing exercise.         Gait and Balance Evaluation:  Normal  Does the patient have evidence of cognitive impairment? No                                                                                                Health  Risk Assessment    Smoking Status:  Social History     Tobacco Use   Smoking Status Never    Passive exposure: Never   Smokeless Tobacco Former    Types: Chew     Alcohol Consumption:  Social History "     Substance and Sexual Activity   Alcohol Use Never       Fall Risk Screen  STEADI Fall Risk Assessment was completed, and patient is at LOW risk for falls.Assessment completed on:10/9/2024    Depression Screening:      10/9/2024     9:07 AM   PHQ-2/PHQ-9 Depression Screening   Little Interest or Pleasure in Doing Things 0-->not at all   Feeling Down, Depressed or Hopeless 0-->not at all   PHQ-9: Brief Depression Severity Measure Score 0     Health Habits and Functional and Cognitive Screening:      10/9/2024     9:08 AM   Functional & Cognitive Status   Do you have difficulty preparing food and eating? No   Do you have difficulty bathing yourself, getting dressed or grooming yourself? No   Do you have difficulty using the toilet? No   Do you have difficulty moving around from place to place? No   Do you have trouble with steps or getting out of a bed or a chair? No   Current Diet Well Balanced Diet   Dental Exam Not up to date   Eye Exam Not up to date   Exercise (times per week) 4 times per week   Current Exercises Include Weightlifting   Do you need help using the phone?  No   Are you deaf or do you have serious difficulty hearing?  No   Do you need help to go to places out of walking distance? No   Do you need help shopping? No   Do you need help preparing meals?  No   Do you need help with housework?  No   Do you need help with laundry? No   Do you need help taking your medications? No   Do you need help managing money? No   Do you ever drive or ride in a car without wearing a seat belt? No   Have you felt unusual stress, anger or loneliness in the last month? No   Who do you live with? Spouse   If you need help, do you have trouble finding someone available to you? No   Have you been bothered in the last four weeks by sexual problems? No   Do you have difficulty concentrating, remembering or making decisions? No           Visual Acuity:  No results found.  Age-appropriate Screening Schedule:  Refer to the  list below for future screening recommendations based on patient's age, sex and/or medical conditions. Orders for these recommended tests are listed in the plan section. The patient has been provided with a written plan.    Health Maintenance List  Health Maintenance   Topic Date Due    BMI FOLLOWUP  10/02/2024    COVID-19 Vaccine (3 - 2023-24 season) 12/29/2024 (Originally 9/1/2024)    INFLUENZA VACCINE  03/31/2025 (Originally 8/1/2024)    ANNUAL WELLNESS VISIT  10/09/2025    TDAP/TD VACCINES (2 - Tdap) 01/13/2033    HEPATITIS C SCREENING  Completed    Pneumococcal Vaccine 0-64  Aged Out                                                                                                                                                CMS Preventative Services Quick Reference  Risk Factors Identified During Encounter  None Identified    The above risks/problems have been discussed with the patient.  Pertinent information has been shared with the patient in the After Visit Summary.  An After Visit Summary and PPPS were made available to the patient.    Follow Up:   Next Medicare Wellness visit to be scheduled in 1 year.          Additional E&M Note during same encounter follows:  Patient has multiple medical problems which are significant and separately identifiable that require additional work above and beyond the Medicare Wellness Visit.      Chief Complaint  Medicare Wellness-Initial Visit and Toe Injury (Toe infection - 2 months)    Thai Ball is a 34 y.o. male who presents to [facility]     Working out 4 times a week, in a bowling league twice a week, watching diet, doesn't feel like he is eating that much but has gained weight.    Great toe on left foot swells and then goes down but will swell back up.      Continues with rash all over his body.  States it will become red but never make a head.      Objective   Vital Signs:   Vitals:    10/09/24 0905   BP: 115/69   Pulse: 82  "  SpO2: 95%   Weight: (!) 171 kg (376 lb 12.8 oz)   Height: 188 cm (74.02\")   PainSc:   6   PainLoc: Toe  Comment: left big toe       Wt Readings from Last 3 Encounters:   10/09/24 (!) 171 kg (376 lb 12.8 oz)   09/13/24 (!) 168 kg (369 lb 9.6 oz)   08/20/24 (!) 165 kg (364 lb 12.8 oz)     BP Readings from Last 3 Encounters:   10/09/24 115/69   09/13/24 126/84   08/20/24 120/71       Physical Exam  Vitals reviewed.   Constitutional:       Appearance: Normal appearance.   Cardiovascular:      Rate and Rhythm: Normal rate and regular rhythm.      Pulses: Normal pulses.      Heart sounds: Normal heart sounds.   Pulmonary:      Effort: Pulmonary effort is normal.      Breath sounds: Normal breath sounds.   Skin:     General: Skin is warm and dry.      Comments: Red papular rash over chest and back.   Neurological:      Mental Status: He is alert and oriented to person, place, and time.   Psychiatric:         Mood and Affect: Mood normal.         Behavior: Behavior normal.         Thought Content: Thought content normal.         Judgment: Judgment normal.         The following data was reviewed by PILLO Haines on 10/09/2024  Common Labs   Common labs          3/20/2024    14:50 5/21/2024    07:57 7/16/2024    09:13   Common Labs   Glucose 87   99    BUN 11   19    Creatinine 0.72   0.79    Sodium 139   139    Potassium 4.5   3.8    Chloride 102   105    Calcium 8.8   8.9    Albumin 3.7   4.1    Total Bilirubin 1.0   0.7    Alkaline Phosphatase 109   108    AST (SGOT) 530   21    ALT (SGPT) 212   42    WBC 11.83  11.31     Hemoglobin 15.4  15.5     Hematocrit 46.0  46.5     Platelets 272  185     Hemoglobin A1C  5.60     Uric Acid  6.9       Assessment & Plan     Diagnoses and all orders for this visit:    1. Encounter for general adult medical examination with abnormal findings (Primary)    2. DDD (degenerative disc disease), thoracolumbar  Comments:  Increase Lyrica 200 mg bid  Orders:  -     pregabalin " (LYRICA) 200 MG capsule; Take 1 capsule by mouth 2 (Two) Times a Day.  Dispense: 60 capsule; Refill: 1    3. Cervical spondylosis  -     pregabalin (LYRICA) 200 MG capsule; Take 1 capsule by mouth 2 (Two) Times a Day.  Dispense: 60 capsule; Refill: 1    4. Fibromyalgia  -     pregabalin (LYRICA) 200 MG capsule; Take 1 capsule by mouth 2 (Two) Times a Day.  Dispense: 60 capsule; Refill: 1    5. Acne vulgaris  -     spironolactone (ALDACTONE) 50 MG tablet; Take 1 tablet by mouth Daily.  Dispense: 90 tablet; Refill: 1    6. Infection of nail bed of toe of left foot  -     amoxicillin-clavulanate (AUGMENTIN) 875-125 MG per tablet; Take 1 tablet by mouth 2 (Two) Times a Day for 10 days.  Dispense: 20 tablet; Refill: 0  -     Ambulatory Referral to Podiatry    7. Weight gain  -     TSH  -     T4, free  -     Semaglutide-Weight Management (Wegovy) 0.5 MG/0.5ML solution auto-injector; Inject 0.5 mL under the skin into the appropriate area as directed 1 (One) Time Per Week.  Dispense: 2 mL; Refill: 1    8. Prediabetes  -     Hemoglobin A1c    9. Thyroid disorder screening  -     TSH  -     T4, free    10. Class 3 severe obesity due to excess calories with serious comorbidity and body mass index (BMI) of 45.0 to 49.9 in adult  -     Semaglutide-Weight Management (Wegovy) 0.5 MG/0.5ML solution auto-injector; Inject 0.5 mL under the skin into the appropriate area as directed 1 (One) Time Per Week.  Dispense: 2 mL; Refill: 1  -     Semaglutide-Weight Management (Wegovy) 0.25 MG/0.5ML solution auto-injector; Inject 0.5 mL under the skin into the appropriate area as directed 1 (One) Time Per Week. EXP: 11/2025  Lot: [id number]  Dispense: 2 mL; Refill: 0           FOLLOW UP    Return in about 4 weeks (around 11/6/2024).    Patient was given instructions and counseling regarding his condition or for health maintenance advice. Please see specific information pulled into the AVS if appropriate.     Stacy Conn,  PILLO  10/09/24  09:46 EDT

## 2024-10-06 DIAGNOSIS — R79.89 LOW TESTOSTERONE IN MALE: ICD-10-CM

## 2024-10-07 RX ORDER — TESTOSTERONE CYPIONATE 200 MG/ML
200 INJECTION, SOLUTION INTRAMUSCULAR
Qty: 4 ML | Refills: 0 | Status: SHIPPED | OUTPATIENT
Start: 2024-10-07

## 2024-10-09 ENCOUNTER — OFFICE VISIT (OUTPATIENT)
Dept: FAMILY MEDICINE CLINIC | Facility: CLINIC | Age: 34
End: 2024-10-09
Payer: COMMERCIAL

## 2024-10-09 VITALS
HEART RATE: 82 BPM | SYSTOLIC BLOOD PRESSURE: 115 MMHG | BODY MASS INDEX: 40.43 KG/M2 | HEIGHT: 74 IN | WEIGHT: 315 LBS | DIASTOLIC BLOOD PRESSURE: 69 MMHG | OXYGEN SATURATION: 95 %

## 2024-10-09 DIAGNOSIS — M47.812 CERVICAL SPONDYLOSIS: ICD-10-CM

## 2024-10-09 DIAGNOSIS — R79.89 LOW TESTOSTERONE IN MALE: ICD-10-CM

## 2024-10-09 DIAGNOSIS — R73.03 PREDIABETES: ICD-10-CM

## 2024-10-09 DIAGNOSIS — M51.35 DDD (DEGENERATIVE DISC DISEASE), THORACOLUMBAR: ICD-10-CM

## 2024-10-09 DIAGNOSIS — Z00.01 ENCOUNTER FOR GENERAL ADULT MEDICAL EXAMINATION WITH ABNORMAL FINDINGS: Primary | ICD-10-CM

## 2024-10-09 DIAGNOSIS — L70.0 ACNE VULGARIS: ICD-10-CM

## 2024-10-09 DIAGNOSIS — E66.813 CLASS 3 SEVERE OBESITY DUE TO EXCESS CALORIES WITH SERIOUS COMORBIDITY AND BODY MASS INDEX (BMI) OF 45.0 TO 49.9 IN ADULT: ICD-10-CM

## 2024-10-09 DIAGNOSIS — L03.032 INFECTION OF NAIL BED OF TOE OF LEFT FOOT: ICD-10-CM

## 2024-10-09 DIAGNOSIS — M79.7 FIBROMYALGIA: ICD-10-CM

## 2024-10-09 DIAGNOSIS — E66.01 CLASS 3 SEVERE OBESITY DUE TO EXCESS CALORIES WITH SERIOUS COMORBIDITY AND BODY MASS INDEX (BMI) OF 45.0 TO 49.9 IN ADULT: ICD-10-CM

## 2024-10-09 DIAGNOSIS — Z13.29 THYROID DISORDER SCREENING: ICD-10-CM

## 2024-10-09 DIAGNOSIS — R63.5 WEIGHT GAIN: ICD-10-CM

## 2024-10-09 RX ORDER — SEMAGLUTIDE 0.25 MG/.5ML
0.25 INJECTION, SOLUTION SUBCUTANEOUS WEEKLY
Qty: 2 ML | Refills: 0 | COMMUNITY
Start: 2024-10-09

## 2024-10-09 RX ORDER — ONDANSETRON 4 MG/1
4 TABLET, ORALLY DISINTEGRATING ORAL EVERY 8 HOURS PRN
Qty: 30 TABLET | Refills: 1 | Status: SHIPPED | OUTPATIENT
Start: 2024-10-09

## 2024-10-09 RX ORDER — SPIRONOLACTONE 50 MG/1
50 TABLET, FILM COATED ORAL DAILY
Qty: 90 TABLET | Refills: 1 | Status: SHIPPED | OUTPATIENT
Start: 2024-10-09

## 2024-10-09 RX ORDER — SEMAGLUTIDE 0.5 MG/.5ML
0.5 INJECTION, SOLUTION SUBCUTANEOUS WEEKLY
Qty: 2 ML | Refills: 1 | Status: SHIPPED | OUTPATIENT
Start: 2024-10-09

## 2024-10-09 RX ORDER — PREGABALIN 200 MG/1
200 CAPSULE ORAL 2 TIMES DAILY
Qty: 60 CAPSULE | Refills: 1 | Status: SHIPPED | OUTPATIENT
Start: 2024-10-09

## 2024-10-23 ENCOUNTER — TELEPHONE (OUTPATIENT)
Dept: ONCOLOGY | Facility: HOSPITAL | Age: 34
End: 2024-10-23
Payer: OTHER GOVERNMENT

## 2024-10-23 NOTE — TELEPHONE ENCOUNTER
Pt is r/s for labs and follow up.  Labs are to be done 3 weeks prior to follow up.  Pt is aware of dates and times of appts.

## 2024-10-23 NOTE — TELEPHONE ENCOUNTER
Caller: [email or web address]    Relationship: Emergency Contact    Best call back number: [phone] LOAN     What is the best time to reach you: ANY    Who are you requesting to speak with (clinical staff, provider,  specific staff member): SCHEDULING     What was the call regarding: MIREYA IS CALLING TO GET LOAN AN APPOINTMENT FOR LAB AND FOLLOW UP       PLEASE ADVISE

## 2024-11-04 ENCOUNTER — TELEPHONE (OUTPATIENT)
Dept: FAMILY MEDICINE CLINIC | Facility: CLINIC | Age: 34
End: 2024-11-04
Payer: COMMERCIAL

## 2024-11-04 NOTE — TELEPHONE ENCOUNTER
Attempted to call patient to r/s 11/5/24 appt due to Stacy being out for surgery, patient did not answer. Left vm for patient to call back to r/s.

## 2024-11-05 DIAGNOSIS — R79.89 LOW TESTOSTERONE IN MALE: ICD-10-CM

## 2024-11-06 RX ORDER — TESTOSTERONE CYPIONATE 200 MG/ML
200 INJECTION, SOLUTION INTRAMUSCULAR
Qty: 4 ML | Refills: 0 | Status: SHIPPED | OUTPATIENT
Start: 2024-11-06

## 2024-11-06 NOTE — TELEPHONE ENCOUNTER
Caller: MIREYA NGUYEN    Relationship: Emergency Contact    Best call back number: [phone]     Requested Prescriptions:   Requested Prescriptions     Pending Prescriptions Disp Refills    Testosterone Cypionate (Depo-Testosterone) 200 MG/ML injection 4 mL 0     Sig: Inject 1 mL into the appropriate muscle as directed by prescriber Every 7 (Seven) Days.        Pharmacy where request should be sent: [facility]ARMACY #[id number] - [place], [place] - [address] - [phone] [facility] [phone] FX     Last office visit with prescribing clinician: 10/9/2024   Last telemedicine visit with prescribing clinician: Visit date not found   Next office visit with prescribing clinician: 11/22/2024     Additional details provided by patient: PATIENT IS DUE FOR INJECTION ON MONDAY      Douglas Brito Rep   11/06/24 12:11 EST        Patient : Karla Villegas Age: 78 year old Sex: female   MRN: [id number] Encounter Date: 5/4/2023    History     Chief Complaint   Patient presents with   • Dizziness       HPI    Karla Villegas is a 78 year old w h/o CVA, orthostatic hypotension, CAD, CHF, diverticulitis cervical stenosis s/p surgery 1/19/2023 w h/o HTn, afib on eliquis is presenting to the emergency department with fast heart rate and weakness. Pt was at physical therapy / occ therapy this morning and felt weak. They noted patient back in atrial fibrillation at 180, and sent patient to the ED. Pt woke up today feeling dizzy/ sweaty.  Patient started feeling unwell yesterday: weak, sweaty, nauseated but not vomiting with \"my stomach bothering me\" with associated headache. Pt had diarrhea twice yesterday and has had decreased but ongoing amount of oral intake due to mild anorexia since yesterday. No chest pain, no change in chronic discomfort, no dif breathing. Lying down seems to decrease discomfort.     Therapy notes reviewed from 5/4/23 10:50 by Tanner Bassett, at which time heart rate 180 and SBP  125/79 and patient was diaphoretic/ pale/ lightheaded.     PMD Niki Carreon  Surg Dr.Saltry Clyde Brar    No Known Allergies    Current Facility-Administered Medications   Medication   • lidocaine (LIDOCARE) 4 % patch 1 patch   • potassium CHLORIDE 20 mEq/100mL IVPB premix   • metoPROLOL (LOPRESSOR) injection 5 mg     Current Outpatient Medications   Medication Sig   • gabapentin (NEURONTIN) 100 MG capsule Take 2 capsules by mouth every 8 hours.   • HYDROcodone-acetaminophen (NORCO) 5-325 MG per tablet Take 1 tablet by mouth every 6 hours as needed for Pain.   • cyclobenzaprine (FLEXERIL) 10 MG tablet Take 10 mg by mouth 2 times daily as needed for Muscle spasms.   • omeprazole (PriLOSEC) 40 MG capsule Take 40 mg by mouth daily as needed (Heartburn).   • ALPRAZolam (XANAX) 0.5 MG tablet Take 0.5 mg by mouth nightly as needed for Sleep.   • levothyroxine  100 MCG tablet Take 100 mcg by mouth daily.   • rivaroxaban (XARELTO) 20 MG Tab Take 20 mg by mouth every evening.       Past Medical History:   Diagnosis Date   • Anxiety    • Aortic stenosis    • Arthritis    • Atrial fibrillation (CMD)    • Atrial flutter (CMD)    • Congestive cardiac failure (CMD)    • Diverticulitis    • ZHANG (dyspnea on exertion)    • Essential (primary) hypertension    • Gall stone    • PONV (postoperative nausea and vomiting)    • Thyroid disease        Past Surgical History:   Procedure Laterality Date   • Ep study/atrial flutter ablation - cv     • Hb ablation additional a-fib     • Joint replacement Bilateral 2020, 2020   • Replac aort valv prosth valv     • Tonsillectomy         Family History   Problem Relation Age of Onset   • Cancer Mother    • Heart disease Mother    • Cancer Father        Social History     Tobacco Use   • Smoking status: Former     Current packs/day: 0.00     Types: Cigarettes     Quit date:      Years since quittin.3   • Smokeless tobacco: Never   Vaping Use   • Vaping status: never used   Substance Use Topics   • Alcohol use: Yes     Alcohol/week: 4.0 standard drinks of alcohol     Types: 4 Glasses of wine per week     Comment: wine every other day   • Drug use: Never       Review of Systems     Review of Systems    Physical Exam     ED Triage Vitals   ED Triage Vitals Group      Temp 23 1122 98.4 °F (36.9 °C)      Heart Rate 23 1052 (!) 107      Resp 23 1052 15      BP 23 1052 121/83      SpO2 23 1052 96 %      EtCO2 mmHg --       Height --       Weight --       Weight Scale Used --       BMI (Calculated) --       IBW/kg (Calculated) --        Physical Exam  Vitals and nursing note reviewed.   Constitutional:       Appearance: She is ill-appearing.   HENT:      Head: Normocephalic and atraumatic.      Nose: Nose normal.      Mouth/Throat:      Mouth: Mucous membranes are dry.      Neck: Normal range of motion.    Eyes:      Extraocular Movements: Extraocular movements intact.      Pupils: Pupils are equal, round, and reactive to light.   Cardiovascular:      Rate and Rhythm: Normal rate. Rhythm irregular.      Pulses: Normal pulses.      Heart sounds: Normal heart sounds.   Pulmonary:      Effort: Pulmonary effort is normal.      Breath sounds: Normal breath sounds.   Abdominal:      General: Abdomen is flat. Bowel sounds are normal.      Palpations: Abdomen is soft.   Musculoskeletal:         General: Normal range of motion.   Skin:     General: Skin is warm.      Findings: No rash.   Neurological:      General: No focal deficit present.      Mental Status: She is alert and oriented to person, place, and time. Mental status is at baseline.   Psychiatric:         Mood and Affect: Mood normal.         Behavior: Behavior normal.           Procedures     Critical Care    Performed by: Tamia Fonseca MD  Authorized by: Tamia Fonseca MD    Critical care provider statement:     Critical care time (minutes):  30    Critical care time was exclusive of:  Teaching time and separately billable procedures and treating other patients    Critical care was necessary to treat or prevent imminent or life-threatening deterioration of the following conditions:  Circulatory failure and cardiac failure    Critical care was time spent personally by me on the following activities:  Obtaining history from patient or surrogate, examination of patient, evaluation of patient's response to treatment, discussions with primary provider, discussions with consultants, development of treatment plan with patient or surrogate, ordering and performing treatments and interventions, ordering and review of laboratory studies, ordering and review of radiographic studies, pulse oximetry, re-evaluation of patient's condition and review of old charts    I assumed direction of critical care for this patient from another provider in my specialty: no      Care  discussed with: admitting provider          Lab Results     Results for orders placed or performed during the hospital encounter of 05/04/23   Comprehensive Metabolic Panel   Result Value Ref Range    Fasting Status      Sodium 133 (L) 135 - 145 mmol/L    Potassium 2.8 (L) 3.4 - 5.1 mmol/L    Chloride 101 97 - 110 mmol/L    Carbon Dioxide 29 21 - 32 mmol/L    Anion Gap 6 (L) 7 - 19 mmol/L    Glucose 182 (H) 70 - 99 mg/dL    BUN 15 6 - 20 mg/dL    Creatinine 0.72 0.51 - 0.95 mg/dL    Glomerular Filtration Rate 87 >=60    BUN/Cr 21 7 - 25    Calcium 9.0 8.4 - 10.2 mg/dL    Bilirubin, Total 0.5 0.2 - 1.0 mg/dL    GOT/AST 26 <=37 Units/L    GPT/ALT 32 <64 Units/L    Alkaline Phosphatase 68 45 - 117 Units/L    Albumin 3.0 (L) 3.6 - 5.1 g/dL    Protein, Total 7.2 6.4 - 8.2 g/dL    Globulin 4.2 (H) 2.0 - 4.0 g/dL    A/G Ratio 0.7 (L) 1.0 - 2.4   TROPONIN I, HIGH SENSITIVITY   Result Value Ref Range    Troponin I, High Sensitivity 19 <52 ng/L   NT proBNP   Result Value Ref Range    NT-proBNP 3,131 (H) <=450 pg/mL   Magnesium   Result Value Ref Range    Magnesium 2.0 1.7 - 2.4 mg/dL   CBC with Automated Differential (performable only)   Result Value Ref Range    WBC 8.5 4.2 - 11.0 K/mcL    RBC 5.06 4.00 - 5.20 mil/mcL    HGB 15.2 12.0 - 15.5 g/dL    HCT 45.3 36.0 - 46.5 %    MCV 89.5 78.0 - 100.0 fl    MCH 30.0 26.0 - 34.0 pg    MCHC 33.6 32.0 - 36.5 g/dL    RDW-CV 14.3 11.0 - 15.0 %    RDW-SD 46.2 39.0 - 50.0 fL     140 - 450 K/mcL    NRBC 0 <=0 /100 WBC    Neutrophil, Percent 79 %    Lymphocytes, Percent 12 %    Mono, Percent 8 %    Eosinophils, Percent 1 %    Basophils, Percent 0 %    Immature Granulocytes 0 %    Absolute Neutrophils 6.6 1.8 - 7.7 K/mcL    Absolute Lymphocytes 1.0 1.0 - 4.0 K/mcL    Absolute Monocytes 0.7 0.3 - 0.9 K/mcL    Absolute Eosinophils  0.1 0.0 - 0.5 K/mcL    Absolute Basophils 0.0 0.0 - 0.3 K/mcL    Absolute Immature Granulocytes 0.0 0.0 - 0.2 K/mcL   GLUCOSE, BEDSIDE - POINT OF CARE    Result Value Ref Range    GLUCOSE, BEDSIDE - POINT OF CARE 208 (H) 70 - 99 mg/dL       EKG     Muse EKG report   Results for orders placed or performed during the hospital encounter of 05/04/23   Electrocardiogram 12-Lead   Result Value Ref Range    Ventricular Rate EKG/Min (BPM) 118     Atrial Rate (BPM) 300     QRS-Interval (MSEC) 84     QT-Interval (MSEC) 344     QTc 482     R Axis (Degrees) 24     T Axis (Degrees) 56     REPORT TEXT       Atrial fibrillation  with premature ventricular or aberrantly conducted complexes  Abnormal ECG  When compared with ECG of  23-JAN-2023 11:22,  premature atrial complexes  are no longer  present  Nonspecific T wave abnormality no longer evident in  Lateral leads         Radiology Results     Imaging Results          XR CHEST PA OR AP 1 VIEW (Final result)  Result time 05/04/23 12:52:29    Final result                 Impression:      1.   No acute cardiopulmonary disease.               Electronically Signed by: BERNARD MATUTE MD   Signed on: 5/4/2023 12:52 PM   Workstation ID: [id number]             Narrative:    RADIOGRAPHIC EXAMINATION OF THE CHEST: 5/4/2023 11:30 AM    CLINICAL HISTORY: 78 years of age, Female, dizziness, trabeculation    COMPARISON: 01/23/2023.    PROCEDURE COMMENTS: Single view of the chest.     FINDINGS:     Cardiac silhouette is normal in size.  An aortic valve prosthesis present.   No findings of pulmonary edema.  No focal consolidation.  No pleural  effusion or pneumothorax.  Diffuse decreased bone mineralization.  Anterior  cervical spinal fusion hardware is noted.                                ED Medications     ED Medication Orders (From admission, onward)    Ordered Start     Status Ordering Provider    05/04/23 1157 05/04/23 1358  potassium CHLORIDE 20 mEq/100mL IVPB premix  (Potassium CHLORIDE IVPB One Time STAT Order for ED use)  ONCE        See Hyperspace for full Linked Orders Report.    Last MAR action: PAULA Milian     05/04/23 1318 05/04/23 1319  lactated ringers bolus 500 mL  ONCE         Last MAR action: Completed ALICE DE LEÓN    05/04/23 1157 05/04/23 1157  potassium CHLORIDE (KLOR-CON M) marino ER tablet 40 mEq  ONCE         Last MAR action: Given TAMIA FONSECA L    05/04/23 1157 05/04/23 1157  potassium CHLORIDE 20 mEq/100mL IVPB premix  (Potassium CHLORIDE IVPB One Time STAT Order for ED use)  ONCE        See [facility] for full Linked Orders Report.    Last MAR action: Completed IVISCARENA L    05/04/23 1148 05/04/23 1149  lidocaine (LIDOCARE) 4 % patch 1 patch  ONCE         Last MAR action: Patch Applied ALICE DE LEÓN    05/04/23 1137 05/04/23 1138  lactated ringers bolus 500 mL  ONCE         Last MAR action: Completed ALICE DE LEÓN          ED Course     Vitals:    05/04/23 1052 05/04/23 1122 05/04/23 1451   BP: 121/83 114/88 (!) 131/95   Pulse: (!) 107 (!) 113 (!) 122   Resp: 15 20 18   Temp:  98.4 °F (36.9 °C)    TempSrc:  Oral    SpO2: 96% 94% 94%       ED Course as of 05/04/23 1529   Thu May 04, 2023   1157 NT proBNP(!): 3,131  Improved from prior [AI]   1157 Troponin I, High Sensitivity: 19 [AI]   1157 Potassium(!): 2.8  Will replete [AI]   1357 Reassessed patient who remains in A-fib with rates 110-130.  Has received 500 cc LR bolus as well as potassium repletion.  Will give addtl 500cc bolus and reach out to cardiology to discuss recommendations and disposition. [AI]   1504 Paged cards [AI]   1516 Cards will see pt in ED and give recs [AI]   1526 Fluids given, but patient persistently in atrial fibrillation with rapid ventricular response, blood pressure has been stable, although patient has history of congestive heart failure, her most recent ejection fraction was about 55%.  Lopressor is ordered.  Cardiology is on consult.  Patient agrees with plan for admission. [MF]      ED Course User Index  [AI] Alice De León MD  [MF] Tamia Fonseca MD       Cardiac Monitor Review: 11:55 AM  I have  independently reviewed the patients cardiac monitor. The patient's rhythm shows atrial fibrillation  with rate of 110 bpm.            Consults                    Medical Decision Making  Unwell but nontoxic-appearing dehydrated 77 yo f w afib/ rvr possibly secondary to vomiting/ diarrhea. Pt appears stable with /88 and heart rate  during evaluation, but significantly uncomfortable on arrival. Plan labs/ fluids/ rehydration, assess fluid status    Atrial fibrillation with rapid ventricular response (CMD): acute illness or injury  Dehydration: acute illness or injury  Hypokalemia: acute illness or injury  Amount and/or Complexity of Data Reviewed  External Data Reviewed: notes.  Labs: ordered. Decision-making details documented in ED Course.     Details: Oh kalemia 2.8 corrected with IV and p.o. potassium, chemistry is unremarkable.  CBC is stable including red and white cell count, troponin is normal at 19 and BNP at 3131 is below previous, Mg stable at 2.0 and no addt'l intervention needed  Radiology: ordered and independent interpretation performed.  ECG/medicine tests: ordered and independent interpretation performed.     Details: afib rvr 135 without acute st change      Risk  Parenteral controlled substances.  Decision regarding hospitalization.                                     MDM done in ED Course    Does the Patient have sepsis: NO     Critical Care       No Critical Care        Disposition       Clinical Impression and Diagnosis  11:54 AM 05/04/23     Diagnosis:   1. Atrial fibrillation with rapid ventricular response (CMD)    2. Dehydration    3. Hypokalemia           Pt to be admitted to hospitalist covering for Dr.Mary Petra Carreon     Condition on Admission: Serious              There is no disposition no dispo time  There is no comment                   Tamia Fonseca MD  05/04/23 9858

## 2024-11-12 ENCOUNTER — LAB (OUTPATIENT)
Dept: LAB | Facility: HOSPITAL | Age: 34
End: 2024-11-12
Payer: MEDICARE

## 2024-11-12 LAB
BASOPHILS # BLD AUTO: 0.09 10*3/MM3 (ref 0–0.2)
BASOPHILS NFR BLD AUTO: 0.6 % (ref 0–1.5)
DEPRECATED RDW RBC AUTO: 35.9 FL (ref 37–54)
EOSINOPHIL # BLD AUTO: 0.12 10*3/MM3 (ref 0–0.4)
EOSINOPHIL NFR BLD AUTO: 0.8 % (ref 0.3–6.2)
ERYTHROCYTE [DISTWIDTH] IN BLOOD BY AUTOMATED COUNT: 12.2 % (ref 12.3–15.4)
ESTRADIOL SERPL HS-MCNC: 111 PG/ML
HBA1C MFR BLD: 5.5 % (ref 4.8–5.6)
HCT VFR BLD AUTO: 49.9 % (ref 37.5–51)
HGB BLD-MCNC: 16.5 G/DL (ref 13–17.7)
IMM GRANULOCYTES # BLD AUTO: 0.29 10*3/MM3 (ref 0–0.05)
IMM GRANULOCYTES NFR BLD AUTO: 1.8 % (ref 0–0.5)
LYMPHOCYTES # BLD AUTO: 2.99 10*3/MM3 (ref 0.7–3.1)
LYMPHOCYTES NFR BLD AUTO: 18.9 % (ref 19.6–45.3)
MCH RBC QN AUTO: 26.8 PG (ref 26.6–33)
MCHC RBC AUTO-ENTMCNC: 33.1 G/DL (ref 31.5–35.7)
MCV RBC AUTO: 81.1 FL (ref 79–97)
MONOCYTES # BLD AUTO: 1 10*3/MM3 (ref 0.1–0.9)
MONOCYTES NFR BLD AUTO: 6.3 % (ref 5–12)
NEUTROPHILS NFR BLD AUTO: 11.35 10*3/MM3 (ref 1.7–7)
NEUTROPHILS NFR BLD AUTO: 71.6 % (ref 42.7–76)
NRBC BLD AUTO-RTO: 0 /100 WBC (ref 0–0.2)
PLATELET # BLD AUTO: 352 10*3/MM3 (ref 140–450)
PMV BLD AUTO: 11.3 FL (ref 6–12)
RBC # BLD AUTO: 6.15 10*6/MM3 (ref 4.14–5.8)
T4 FREE SERPL-MCNC: 1.42 NG/DL (ref 0.92–1.68)
TESTOST SERPL-MCNC: >1500 NG/DL (ref 249–836)
TSH SERPL DL<=0.05 MIU/L-ACNC: 1.94 UIU/ML (ref 0.27–4.2)
WBC NRBC COR # BLD AUTO: 15.84 10*3/MM3 (ref 3.4–10.8)

## 2024-11-12 PROCEDURE — 84439 ASSAY OF FREE THYROXINE: CPT | Performed by: NURSE PRACTITIONER

## 2024-11-12 PROCEDURE — 84443 ASSAY THYROID STIM HORMONE: CPT | Performed by: NURSE PRACTITIONER

## 2024-11-12 PROCEDURE — 83036 HEMOGLOBIN GLYCOSYLATED A1C: CPT | Performed by: NURSE PRACTITIONER

## 2024-11-12 PROCEDURE — 82670 ASSAY OF TOTAL ESTRADIOL: CPT | Performed by: NURSE PRACTITIONER

## 2024-11-12 PROCEDURE — 84403 ASSAY OF TOTAL TESTOSTERONE: CPT | Performed by: NURSE PRACTITIONER

## 2024-11-12 PROCEDURE — 85025 COMPLETE CBC W/AUTO DIFF WBC: CPT | Performed by: NURSE PRACTITIONER

## 2024-11-15 RX ORDER — ANASTROZOLE 1 MG/1
1 TABLET ORAL 3 TIMES WEEKLY
Qty: 36 TABLET | Refills: 0 | Status: SHIPPED | OUTPATIENT
Start: 2024-11-15

## 2024-11-18 ENCOUNTER — OFFICE VISIT (OUTPATIENT)
Dept: PODIATRY | Facility: CLINIC | Age: 34
End: 2024-11-18
Payer: MEDICARE

## 2024-11-18 VITALS
OXYGEN SATURATION: 97 % | HEART RATE: 90 BPM | HEIGHT: 74 IN | WEIGHT: 315 LBS | BODY MASS INDEX: 40.43 KG/M2 | DIASTOLIC BLOOD PRESSURE: 83 MMHG | SYSTOLIC BLOOD PRESSURE: 127 MMHG | TEMPERATURE: 99.3 F

## 2024-11-18 DIAGNOSIS — L03.032 INFECTION OF NAIL BED OF TOE OF LEFT FOOT: ICD-10-CM

## 2024-11-18 DIAGNOSIS — L60.0 INGROWN TOENAIL: Primary | ICD-10-CM

## 2024-11-18 DIAGNOSIS — M79.672 LEFT FOOT PAIN: ICD-10-CM

## 2024-11-18 PROCEDURE — 11750 EXCISION NAIL&NAIL MATRIX: CPT | Performed by: PODIATRIST

## 2024-11-18 PROCEDURE — 1160F RVW MEDS BY RX/DR IN RCRD: CPT | Performed by: PODIATRIST

## 2024-11-18 PROCEDURE — 1159F MED LIST DOCD IN RCRD: CPT | Performed by: PODIATRIST

## 2024-11-18 NOTE — PROGRESS NOTES
Tele-Health Visit Note  Subjective     Thai Ball is a 34 y.o. male.     Chief Complaint   Patient presents with    Weight Loss     wegovy     Diagnosed with C-diff, started on Dificid on Wednesday, states his diarrhea has improved.  Wegovy helped with diarrhea. History of IBD-D.  Has lost 16 pounds since the last visit.     Review of Systems   Constitutional:  Negative for chills, fatigue and fever.   Respiratory:  Negative for cough and shortness of breath.    Cardiovascular:  Negative for chest pain and palpitations.   Gastrointestinal:  Positive for diarrhea. Negative for constipation, nausea and vomiting.   Musculoskeletal:  Negative for back pain and neck pain.   Skin:  Negative for rash.   Neurological:  Negative for dizziness and headaches.       Objective     Gen: well-nourished, no acute distress  HENT: atraumatic, normocephalic  Eyes: extraocular movements intact, no scleral icterus  Lungs: breathing comfortably, no cough  Neuro: grossly oriented to person, place, and time. no facial droop   Psych: normal mood and affect    Assessment and Plan   Diagnoses and all orders for this visit:    1. Clostridium difficile diarrhea (Primary)  Comments:  Continue Dificid  Orders:  -     saccharomyces boulardii (Florastor) 250 MG capsule; Take 1 capsule by mouth 2 (Two) Times a Day.  Dispense: 60 capsule; Refill: 2    2. Diarrhea of infectious origin  -     saccharomyces boulardii (Florastor) 250 MG capsule; Take 1 capsule by mouth 2 (Two) Times a Day.  Dispense: 60 capsule; Refill: 2    3. Class 3 severe obesity due to excess calories with serious comorbidity and body mass index (BMI) of 45.0 to 49.9 in adult  Comments:  Start on Compounded semaglutide 0.5 mg weekly for 4 weeks, then increase to 1 mg weekly, then follow up appointment.  Orders:  -     Semaglutide-Weight Management 0.5 MG/0.5ML solution auto-injector; Inject 0.5 mL under the skin into the appropriate area as directed 1 (One) Time Per Week.  Please fill with compounded semaglutide  Dispense: 2 mL; Refill: 1  -     Semaglutide-Weight Management 1 MG/0.5ML solution auto-injector; Inject 0.5 mL under the skin into the appropriate area as directed 1 (One) Time Per Week. Please fill with compounded semaglutide  Dispense: 2 mL; Refill: 1    4. Collagenous colitis  Comments:  Stop budesinide and start Asacol 800 mg EC TID  Orders:  -     mesalamine (ASACOL) 800 MG EC tablet; Take 1 tablet by mouth 3 (Three) Times a Day.  Dispense: 90 tablet; Refill: 3  -     CBC & Differential; Future  -     Comprehensive Metabolic Panel; Future    5. Low testosterone in male  -     Testosterone; Future        Mode of Visit: Video    Location of patient: -HOME-  Location of provider: +[facility]+  You have chosen to receive care through a telehealth visit.  The patient has signed the video visit consent form.  The visit included audio and video interaction. No technical issues occurred during this visit.    Return in about 4 weeks (around 12/20/2024).      Stacy Conn, APRN  11/22/2024  09:19 EST
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
## 2024-11-19 ENCOUNTER — TELEPHONE (OUTPATIENT)
Dept: FAMILY MEDICINE CLINIC | Facility: CLINIC | Age: 34
End: 2024-11-19
Payer: OTHER GOVERNMENT

## 2024-11-19 DIAGNOSIS — Z86.19 HISTORY OF CLOSTRIDIOIDES DIFFICILE INFECTION: Primary | ICD-10-CM

## 2024-11-19 NOTE — TELEPHONE ENCOUNTER
Patient spouse Abbey has dropped off FMLA to be completed due to helping with Thai's care. Abbey is needing the start date for them to be on 11/19/2024. Blank forms attached and left in providers box.

## 2024-11-19 NOTE — TELEPHONE ENCOUNTER
Caller: MIREYA NGUYEN    Relationship: Emergency Contact    Best call back number: CALL PATIENT,     [phone]       What was the call regarding: PATIENT'S WIFE CALLED ABOUT THE PATIENT NEEDING TO DO A STOOL SAMPLE. PATIENT'S WIFE STATES THAT THEY WOULD LIKE FOR HIM TO HAVE THIS DONE BEFORE HIS APPOINTMENT ON 11.22.24 AND THAT THEY NEED TO KNOW WHERE HE GOES TO GET THE KIT.     SEE PATIENT MYCHART MESSAGE WITH PROVIDER.

## 2024-11-20 ENCOUNTER — LAB (OUTPATIENT)
Dept: LAB | Facility: HOSPITAL | Age: 34
End: 2024-11-20
Payer: MEDICARE

## 2024-11-20 DIAGNOSIS — A04.72 CLOSTRIDIUM DIFFICILE DIARRHEA: Primary | ICD-10-CM

## 2024-11-20 DIAGNOSIS — Z86.19 HISTORY OF CLOSTRIDIOIDES DIFFICILE INFECTION: ICD-10-CM

## 2024-11-20 LAB
027 TOXIN: ABNORMAL
C DIFF GDH + TOXINS A+B STL QL IA.RAPID: NEGATIVE
C DIFF TOX GENS STL QL NAA+PROBE: POSITIVE

## 2024-11-20 PROCEDURE — 87493 C DIFF AMPLIFIED PROBE: CPT

## 2024-11-20 PROCEDURE — 87449 NOS EACH ORGANISM AG IA: CPT

## 2024-11-22 ENCOUNTER — TELEMEDICINE (OUTPATIENT)
Dept: FAMILY MEDICINE CLINIC | Facility: CLINIC | Age: 34
End: 2024-11-22
Payer: MEDICARE

## 2024-11-22 DIAGNOSIS — A09 DIARRHEA OF INFECTIOUS ORIGIN: ICD-10-CM

## 2024-11-22 DIAGNOSIS — A04.72 CLOSTRIDIUM DIFFICILE DIARRHEA: Primary | ICD-10-CM

## 2024-11-22 DIAGNOSIS — E66.01 CLASS 3 SEVERE OBESITY DUE TO EXCESS CALORIES WITH SERIOUS COMORBIDITY AND BODY MASS INDEX (BMI) OF 45.0 TO 49.9 IN ADULT: ICD-10-CM

## 2024-11-22 DIAGNOSIS — R79.89 LOW TESTOSTERONE IN MALE: ICD-10-CM

## 2024-11-22 DIAGNOSIS — K52.831 COLLAGENOUS COLITIS: ICD-10-CM

## 2024-11-22 DIAGNOSIS — E66.813 CLASS 3 SEVERE OBESITY DUE TO EXCESS CALORIES WITH SERIOUS COMORBIDITY AND BODY MASS INDEX (BMI) OF 45.0 TO 49.9 IN ADULT: ICD-10-CM

## 2024-11-22 RX ORDER — SACCHAROMYCES BOULARDII 250 MG
250 CAPSULE ORAL 2 TIMES DAILY
Qty: 60 CAPSULE | Refills: 2 | Status: SHIPPED | OUTPATIENT
Start: 2024-11-22

## 2024-11-22 RX ORDER — MESALAMINE 800 MG/1
800 TABLET, DELAYED RELEASE ORAL 3 TIMES DAILY
Qty: 90 TABLET | Refills: 3 | Status: SHIPPED | OUTPATIENT
Start: 2024-11-22

## 2024-11-24 DIAGNOSIS — K58.0 IRRITABLE BOWEL SYNDROME WITH DIARRHEA: ICD-10-CM

## 2024-11-25 RX ORDER — BUDESONIDE 3 MG/1
CAPSULE, COATED PELLETS ORAL
Qty: 270 CAPSULE | Refills: 0 | Status: SHIPPED | OUTPATIENT
Start: 2024-11-25

## 2024-12-04 DIAGNOSIS — R79.89 LOW TESTOSTERONE IN MALE: ICD-10-CM

## 2024-12-04 RX ORDER — TESTOSTERONE CYPIONATE 200 MG/ML
200 INJECTION, SOLUTION INTRAMUSCULAR
Qty: 4 ML | Refills: 0 | Status: SHIPPED | OUTPATIENT
Start: 2024-12-04

## 2024-12-05 ENCOUNTER — OFFICE VISIT (OUTPATIENT)
Dept: PODIATRY | Facility: CLINIC | Age: 34
End: 2024-12-05
Payer: MEDICARE

## 2024-12-05 VITALS
DIASTOLIC BLOOD PRESSURE: 85 MMHG | TEMPERATURE: 96.9 F | SYSTOLIC BLOOD PRESSURE: 127 MMHG | HEIGHT: 74 IN | HEART RATE: 86 BPM | WEIGHT: 315 LBS | OXYGEN SATURATION: 96 % | BODY MASS INDEX: 40.43 KG/M2

## 2024-12-05 DIAGNOSIS — L60.0 INGROWN TOENAIL: Primary | ICD-10-CM

## 2024-12-05 DIAGNOSIS — M79.672 LEFT FOOT PAIN: ICD-10-CM
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
## 2024-12-06 ENCOUNTER — LAB (OUTPATIENT)
Dept: ONCOLOGY | Facility: HOSPITAL | Age: 34
End: 2024-12-06
Payer: MEDICARE

## 2024-12-06 DIAGNOSIS — K21.9 GASTROESOPHAGEAL REFLUX DISEASE WITHOUT ESOPHAGITIS: ICD-10-CM

## 2024-12-06 DIAGNOSIS — D72.829 LEUKOCYTOSIS, UNSPECIFIED TYPE: ICD-10-CM

## 2024-12-06 DIAGNOSIS — K21.9 HIATAL HERNIA WITH GERD: ICD-10-CM

## 2024-12-06 DIAGNOSIS — K44.9 HIATAL HERNIA WITH GERD: ICD-10-CM

## 2024-12-06 LAB
ALBUMIN SERPL-MCNC: 4.2 G/DL (ref 3.5–5.2)
ALBUMIN/GLOB SERPL: 1.2 G/DL
ALP SERPL-CCNC: 103 U/L (ref 39–117)
ALT SERPL W P-5'-P-CCNC: 51 U/L (ref 1–41)
ANION GAP SERPL CALCULATED.3IONS-SCNC: 11.1 MMOL/L (ref 5–15)
ANISOCYTOSIS BLD QL: ABNORMAL
AST SERPL-CCNC: 37 U/L (ref 1–40)
BASOPHILS # BLD AUTO: 0.1 10*3/MM3 (ref 0–0.2)
BASOPHILS NFR BLD AUTO: 0.8 % (ref 0–1.5)
BILIRUB SERPL-MCNC: 1 MG/DL (ref 0–1.2)
BUN SERPL-MCNC: 15 MG/DL (ref 6–20)
BUN/CREAT SERPL: 15.3 (ref 7–25)
CALCIUM SPEC-SCNC: 9 MG/DL (ref 8.6–10.5)
CHLORIDE SERPL-SCNC: 102 MMOL/L (ref 98–107)
CLUMPED PLATELETS: PRESENT
CO2 SERPL-SCNC: 24.9 MMOL/L (ref 22–29)
CREAT SERPL-MCNC: 0.98 MG/DL (ref 0.76–1.27)
DEPRECATED RDW RBC AUTO: 38.6 FL (ref 37–54)
EGFRCR SERPLBLD CKD-EPI 2021: 105.1 ML/MIN/1.73
EOSINOPHIL # BLD AUTO: 0.38 10*3/MM3 (ref 0–0.4)
EOSINOPHIL # BLD MANUAL: 0.13 10*3/MM3 (ref 0–0.4)
EOSINOPHIL NFR BLD AUTO: 2.9 % (ref 0.3–6.2)
EOSINOPHIL NFR BLD MANUAL: 1 % (ref 0.3–6.2)
ERYTHROCYTE [DISTWIDTH] IN BLOOD BY AUTOMATED COUNT: 13.4 % (ref 12.3–15.4)
GLOBULIN UR ELPH-MCNC: 3.4 GM/DL
GLUCOSE SERPL-MCNC: 124 MG/DL (ref 65–99)
HCT VFR BLD AUTO: 49.8 % (ref 37.5–51)
HGB BLD-MCNC: 15.8 G/DL (ref 13–17.7)
IMM GRANULOCYTES # BLD AUTO: 0.13 10*3/MM3 (ref 0–0.05)
IMM GRANULOCYTES NFR BLD AUTO: 1 % (ref 0–0.5)
LARGE PLATELETS: ABNORMAL
LDH SERPL-CCNC: 252 U/L (ref 135–225)
LYMPHOCYTES # BLD AUTO: 3.54 10*3/MM3 (ref 0.7–3.1)
LYMPHOCYTES # BLD MANUAL: 2.89 10*3/MM3 (ref 0.7–3.1)
LYMPHOCYTES NFR BLD AUTO: 26.9 % (ref 19.6–45.3)
LYMPHOCYTES NFR BLD MANUAL: 8 % (ref 5–12)
MCH RBC QN AUTO: 25.8 PG (ref 26.6–33)
MCHC RBC AUTO-ENTMCNC: 31.7 G/DL (ref 31.5–35.7)
MCV RBC AUTO: 81.4 FL (ref 79–97)
MONOCYTES # BLD AUTO: 0.8 10*3/MM3 (ref 0.1–0.9)
MONOCYTES # BLD: 1.05 10*3/MM3 (ref 0.1–0.9)
MONOCYTES NFR BLD AUTO: 6.1 % (ref 5–12)
NEUTROPHILS # BLD AUTO: 9.07 10*3/MM3 (ref 1.7–7)
NEUTROPHILS NFR BLD AUTO: 62.3 % (ref 42.7–76)
NEUTROPHILS NFR BLD AUTO: 8.19 10*3/MM3 (ref 1.7–7)
NEUTROPHILS NFR BLD MANUAL: 69 % (ref 42.7–76)
NRBC BLD AUTO-RTO: 0 /100 WBC (ref 0–0.2)
PATHOLOGY REVIEW: YES
PLATELET # BLD AUTO: 316 10*3/MM3 (ref 140–450)
PMV BLD AUTO: 12 FL (ref 6–12)
POTASSIUM SERPL-SCNC: 4 MMOL/L (ref 3.5–5.2)
PROT SERPL-MCNC: 7.6 G/DL (ref 6–8.5)
RBC # BLD AUTO: 6.12 10*6/MM3 (ref 4.14–5.8)
SMALL PLATELETS BLD QL SMEAR: ADEQUATE
SODIUM SERPL-SCNC: 138 MMOL/L (ref 136–145)
VARIANT LYMPHS NFR BLD MANUAL: 22 % (ref 19.6–45.3)
WBC MORPH BLD: NORMAL
WBC NRBC COR # BLD AUTO: 13.14 10*3/MM3 (ref 3.4–10.8)

## 2024-12-06 PROCEDURE — 80053 COMPREHEN METABOLIC PANEL: CPT

## 2024-12-06 PROCEDURE — 85025 COMPLETE CBC W/AUTO DIFF WBC: CPT

## 2024-12-06 PROCEDURE — 36415 COLL VENOUS BLD VENIPUNCTURE: CPT

## 2024-12-06 PROCEDURE — 83615 LACTATE (LD) (LDH) ENZYME: CPT

## 2024-12-06 RX ORDER — PANTOPRAZOLE SODIUM 40 MG/1
40 TABLET, DELAYED RELEASE ORAL 2 TIMES DAILY
Qty: 60 TABLET | Refills: 2 | Status: SHIPPED | OUTPATIENT
Start: 2024-12-06

## 2024-12-07 LAB — Lab: NORMAL

## 2024-12-13 ENCOUNTER — TELEPHONE (OUTPATIENT)
Dept: GASTROENTEROLOGY | Facility: CLINIC | Age: 34
End: 2024-12-13

## 2024-12-13 NOTE — TELEPHONE ENCOUNTER
Hub staff attempted to follow warm transfer process and was unsuccessful     Caller: Thai Ball    Relationship to patient: Self    Best call back number: [phone]    Patient is needing:TO SEE ABOUT SWITCHING HIS APPT TO A MYCHART VISIT FOR TODAY OR ANOTHER DAY SINCE LYNDA IS BOOKED OUT UNTIL JUNE. PT IS SICK WITH A COLD AND WONT MAKE IT TO HIS APPT IN OFFICE TODAY

## 2024-12-14 DIAGNOSIS — M47.812 CERVICAL SPONDYLOSIS: ICD-10-CM

## 2024-12-14 DIAGNOSIS — M79.7 FIBROMYALGIA: ICD-10-CM

## 2024-12-14 DIAGNOSIS — M51.35 DDD (DEGENERATIVE DISC DISEASE), THORACOLUMBAR: ICD-10-CM

## 2024-12-14 LAB
INTERPRETATION: NEGATIVE
LAB DIRECTOR NAME PROVIDER: NORMAL
LABORATORY COMMENT REPORT: NORMAL
REF LAB TEST METHOD: NORMAL
T(ABL1,BCR)B2A2/CONTROL BLD/T: NORMAL %
T(ABL1,BCR)B3A2/CONTROL BLD/T: NORMAL %
T(ABL1,BCR)E1A2/CONTROL BLD/T: NORMAL %

## 2024-12-16 RX ORDER — PREGABALIN 200 MG/1
200 CAPSULE ORAL 2 TIMES DAILY
Qty: 60 CAPSULE | Refills: 1 | Status: SHIPPED | OUTPATIENT
Start: 2024-12-16

## 2024-12-16 RX ORDER — BACLOFEN 10 MG/1
10 TABLET ORAL 3 TIMES DAILY
Qty: 90 TABLET | Refills: 3 | Status: SHIPPED | OUTPATIENT
Start: 2024-12-16

## 2024-12-26 NOTE — PROGRESS NOTES
Chief Complaint  Follow-up and Urinary Tract Infection (Burning, 3 days)    Subjective          Thai Ball is a 34 y.o. male who presents to [facility]    History of Present Illness    Obesity: Was 376, now 340 using compounded Semaglutide. States his IBS D has improved some since starting it.  Recently had C-diff, then had a stomach bug that lasted 6 days.     Last couple of days feels like it burns when he urinates then it still burns afterwards. Also feels like he is urinating more often.    Has a place at the bottom of his anus that feels irritated at times.    States that he has a funny smell behind his ears even after showering daily.    PHQ-2 Total Score:     PHQ-9 Total Score:         Review of Systems   Constitutional:  Negative for chills.   Gastrointestinal:  Negative for nausea and vomiting.   Genitourinary:  Positive for dysuria, frequency and urgency. Negative for flank pain and hematuria.          Medical History: has a past medical history of Asthma (Jan 2022), Chronic pain, Coronary artery disease (Jan 2022), Depression, Fibromyalgia, Hernia (July 2023), Ingrown toenail (9/4/2024), Irritable bowel syndrome (Jan 2022), Kidney stone, Migraine, Nausea and vomiting (02/08/2023), and Vitamin D deficiency.     Surgical History: has a past surgical history that includes Neck surgery; Esophagogastroduodenoscopy (N/A, 5/25/2023); Colonoscopy (N/A, 5/25/2023); Cholecystectomy (N/A, 12/6/2023); Esophagogastroduodenoscopy (N/A, 5/17/2024); and Colonoscopy (N/A, 5/17/2024).     Family History: family history includes Inflammatory bowel disease in his mother.     Social History: reports that he has never smoked. He has never been exposed to tobacco smoke. He has quit using smokeless tobacco.  His smokeless tobacco use included chew. He reports that he does not drink alcohol and does not use drugs.    Allergies: Compazine [prochlorperazine] and Reglan  [metoclopramide]      There are no preventive care reminders to display for this patient.         Current Outpatient Medications:     albuterol sulfate  (90 Base) MCG/ACT inhaler, Inhale 2 puffs Every 6 (Six) Hours As Needed for Wheezing or Shortness of Air., Disp: 18 g, Rfl: 0    anastrozole (ARIMIDEX) 1 MG tablet, Take 1 tablet by mouth 3 (Three) Times a Week., Disp: 36 tablet, Rfl: 0    Budesonide (ENTOCORT EC) 3 MG 24 hr capsule, TAKE 3 CAPSULES BY MOUTH DAILY FOR 30 DAYS., Disp: 270 capsule, Rfl: 0    celecoxib (CeleBREX) 200 MG capsule, Take 1 capsule by mouth Daily., Disp: 90 capsule, Rfl: 3    dicyclomine (BENTYL) 20 MG tablet, TAKE 1 TABLET BY MOUTH EVERY 6 HOURS AS NEEDED, Disp: 360 tablet, Rfl: 1    galcanezumab-gnlm (Emgality) 120 MG/ML auto-injector pen, Emgality Pen 120 mg/mL subcutaneous pen injector  ADMINISTER 1 ML UNDER THE SKIN EVERY MONTH AS DIRECTED (90 day supply), Disp: , Rfl:     mesalamine (ASACOL) 800 MG EC tablet, Take 1 tablet by mouth 3 (Three) Times a Day., Disp: 270 tablet, Rfl: 3    ondansetron ODT (ZOFRAN-ODT) 8 MG disintegrating tablet, Place 1 tablet on the tongue Every 8 (Eight) Hours As Needed for Nausea., Disp: 60 tablet, Rfl: 3    pantoprazole (PROTONIX) 40 MG EC tablet, TAKE 1 TABLET BY MOUTH TWICE A DAY, Disp: 60 tablet, Rfl: 2    pregabalin (LYRICA) 200 MG capsule, Take 1 capsule by mouth 2 (Two) Times a Day., Disp: 60 capsule, Rfl: 1    Semaglutide-Weight Management 0.5 MG/0.5ML solution auto-injector, Inject 0.5 mL under the skin into the appropriate area as directed 1 (One) Time Per Week. Please fill with compounded semaglutide, Disp: 2 mL, Rfl: 1    spironolactone (ALDACTONE) 50 MG tablet, Take 1 tablet by mouth Daily., Disp: 90 tablet, Rfl: 1    tadalafil (CIALIS) 10 MG tablet, Take 1 tablet by mouth Daily As Needed for Erectile Dysfunction., Disp: 90 tablet, Rfl: 1    Testosterone Cypionate (Depo-Testosterone) 200 MG/ML injection, Inject 1 mL into the  "appropriate muscle as directed by prescriber Every 7 (Seven) Days., Disp: 4 mL, Rfl: 0    traMADol (ULTRAM) 50 MG tablet, Take 1 tablet by mouth Every 8 (Eight) Hours As Needed for Moderate Pain., Disp: 270 tablet, Rfl: 1    Ubrelvy 100 MG tablet, Take 1 tablet by mouth 1 (One) Time As Needed., Disp: , Rfl:     baclofen (LIORESAL) 20 MG tablet, Take 1 tablet by mouth 2 (Two) Times a Day., Disp: 180 tablet, Rfl: 3    Hydrocortisone Ace-Pramoxine 1-1 % rectal cream, Insert  into the rectum 2 (Two) Times a Day., Disp: 30 g, Rfl: 1    nystatin (MYCOSTATIN) 311311 UNIT/GM cream, Apply 1 Application topically to the appropriate area as directed 2 (Two) Times a Day., Disp: 30 g, Rfl: 2    riFAXIMin (XIFAXAN) 550 MG tablet, Take 1 tablet by mouth Every 12 (Twelve) Hours for 14 days., Disp: 28 tablet, Rfl: 0      Immunization History   Administered Date(s) Administered    COVID-19 (UNSPECIFIED) 05/25/2021, 06/15/2021    Fluzone (or Fluarix & Flulaval for VFC) >6mos 01/13/2023    Influenza Injectable Mdck Pf Quad 01/13/2023    TD Preservative Free (Tenivac) 01/13/2023         Objective       Vitals:    12/30/24 0916   BP: 128/87   Pulse: 120   SpO2: 99%   Weight: (!) 154 kg (340 lb 9.6 oz)   Height: 188 cm (74.02\")      Body mass index is 43.71 kg/m².   Wt Readings from Last 3 Encounters:   12/30/24 (!) 154 kg (340 lb 9.6 oz)   12/17/24 (!) 159 kg (351 lb)   12/05/24 (!) 159 kg (351 lb)      BP Readings from Last 3 Encounters:   12/30/24 128/87   12/17/24 157/90   12/05/24 127/85                Physical Exam  Vitals reviewed.   Constitutional:       Appearance: Normal appearance.   Cardiovascular:      Rate and Rhythm: Normal rate and regular rhythm.      Pulses: Normal pulses.      Heart sounds: Normal heart sounds.   Pulmonary:      Effort: Pulmonary effort is normal.      Breath sounds: Normal breath sounds.   Genitourinary:     Comments: Small external hemorrhoid at 6 o'clock position of rectum.  Skin:     General: Skin " is warm and dry.      Findings: Rash present.      Comments: Scattered red papular rash over chest and abdomen.   Neurological:      Mental Status: He is alert and oriented to person, place, and time.   Psychiatric:         Mood and Affect: Mood normal.         Behavior: Behavior normal.         Thought Content: Thought content normal.         Judgment: Judgment normal.             Result Review :       Common labs          7/16/2024    09:13 11/12/2024    16:11 12/6/2024    10:27   Common Labs   Glucose 99   124    BUN 19   15    Creatinine 0.79   0.98    Sodium 139   138    Potassium 3.8   4.0    Chloride 105   102    Calcium 8.9   9.0    Albumin 4.1   4.2    Total Bilirubin 0.7   1.0    Alkaline Phosphatase 108   103    AST (SGOT) 21   37    ALT (SGPT) 42   51    WBC  15.84  13.14    Hemoglobin  16.5  15.8    Hematocrit  49.9  49.8    Platelets  352  316    Hemoglobin A1C  5.50         Brief Urine Lab Results  (Last result in the past 365 days)        Color   Clarity   Blood   Leuk Est   Nitrite   Protein   CREAT   Urine HCG        12/30/24 1003 Kerrick   Clear   Negative   Negative   Negative   30 mg/dL                               Assessment and Plan        Diagnoses and all orders for this visit:    1. Dysuria (Primary)  -     POCT urinalysis dipstick, automated  -     Urine Culture - Urine, Urine, Clean Catch; Future  -     Chlamydia trachomatis, Neisseria gonorrhoeae, PCR - Urine, Urine, Clean Catch  -     PSA DIAGNOSTIC ONLY; Future  -     Urine Culture - Urine, Urine, Clean Catch    2. Collagenous colitis  Comments:  Doing better on Asacol 800 mg EC TID  Orders:  -     mesalamine (ASACOL) 800 MG EC tablet; Take 1 tablet by mouth 3 (Three) Times a Day.  Dispense: 270 tablet; Refill: 3  -     CBC & Differential; Future    3. Nausea in adult  -     ondansetron ODT (ZOFRAN-ODT) 8 MG disintegrating tablet; Place 1 tablet on the tongue Every 8 (Eight) Hours As Needed for Nausea.  Dispense: 60 tablet; Refill:  3    4. Screening for prostate cancer  -     PSA DIAGNOSTIC ONLY; Future    5. Irritable bowel syndrome with both constipation and diarrhea  -     riFAXIMin (XIFAXAN) 550 MG tablet; Take 1 tablet by mouth Every 12 (Twelve) Hours for 14 days.  Dispense: 28 tablet; Refill: 0    6. Yeast dermatitis  Comments:  Apply small amount behind the ears twice a day  Orders:  -     nystatin (MYCOSTATIN) 245865 UNIT/GM cream; Apply 1 Application topically to the appropriate area as directed 2 (Two) Times a Day.  Dispense: 30 g; Refill: 2    7. Disorder of prostate, unspecified  -     PSA DIAGNOSTIC ONLY; Future    8. External hemorrhoid  -     Hydrocortisone Ace-Pramoxine 1-1 % rectal cream; Insert  into the rectum 2 (Two) Times a Day.  Dispense: 30 g; Refill: 1    9. Rash  -     Ambulatory Referral to Dermatology    10. Low testosterone in male  -     Testosterone Cypionate (Depo-Testosterone) 200 MG/ML injection; Inject 1 mL into the appropriate muscle as directed by prescriber Every 7 (Seven) Days.  Dispense: 4 mL; Refill: 0  -     Estradiol; Future  -     Testosterone; Future    11. Fibromyalgia  -     baclofen (LIORESAL) 20 MG tablet; Take 1 tablet by mouth 2 (Two) Times a Day.  Dispense: 180 tablet; Refill: 3          Follow Up     Return in about 2 months (around 2/28/2025).    Patient was given instructions and counseling regarding his condition or for health maintenance advice. Please see specific information pulled into the AVS if appropriate. Patient understands the importance of having any ordered tests to be performed in a timely fashion.      I spent 30 minutes caring for Thai on this date of service. This time includes time spent by me in the following activities: preparing for the visit, reviewing tests, performing a medically appropriate examination and/or evaluation, counseling and educating the patient/family/caregiver, referring and communicating with other health care professionals, documenting information  in the medical record, independently interpreting results and communicating that information with the patient/family/caregiver, care coordination, ordering medications, and ordering test(s)      PILLO Haines    Answers submitted by the patient for this visit:  Primary Reason for Visit (Submitted on 12/30/2024)  What is the primary reason for your visit?: Painful Urination

## 2024-12-27 ENCOUNTER — TELEPHONE (OUTPATIENT)
Dept: ONCOLOGY | Facility: HOSPITAL | Age: 34
End: 2024-12-27
Payer: OTHER GOVERNMENT

## 2024-12-30 ENCOUNTER — OFFICE VISIT (OUTPATIENT)
Dept: FAMILY MEDICINE CLINIC | Facility: CLINIC | Age: 34
End: 2024-12-30
Payer: MEDICARE

## 2024-12-30 ENCOUNTER — LAB (OUTPATIENT)
Dept: LAB | Facility: HOSPITAL | Age: 34
End: 2024-12-30
Payer: MEDICARE

## 2024-12-30 VITALS
HEART RATE: 120 BPM | OXYGEN SATURATION: 99 % | SYSTOLIC BLOOD PRESSURE: 128 MMHG | BODY MASS INDEX: 40.43 KG/M2 | WEIGHT: 315 LBS | HEIGHT: 74 IN | DIASTOLIC BLOOD PRESSURE: 87 MMHG

## 2024-12-30 DIAGNOSIS — R79.89 LOW TESTOSTERONE IN MALE: ICD-10-CM

## 2024-12-30 DIAGNOSIS — Z12.5 SCREENING FOR PROSTATE CANCER: ICD-10-CM

## 2024-12-30 DIAGNOSIS — M79.7 FIBROMYALGIA: ICD-10-CM

## 2024-12-30 DIAGNOSIS — N42.9 DISORDER OF PROSTATE, UNSPECIFIED: ICD-10-CM

## 2024-12-30 DIAGNOSIS — B37.2 YEAST DERMATITIS: ICD-10-CM

## 2024-12-30 DIAGNOSIS — K52.831 COLLAGENOUS COLITIS: ICD-10-CM

## 2024-12-30 DIAGNOSIS — K64.4 EXTERNAL HEMORRHOID: ICD-10-CM

## 2024-12-30 DIAGNOSIS — K58.2 IRRITABLE BOWEL SYNDROME WITH BOTH CONSTIPATION AND DIARRHEA: ICD-10-CM

## 2024-12-30 DIAGNOSIS — R21 RASH: ICD-10-CM

## 2024-12-30 DIAGNOSIS — R30.0 DYSURIA: ICD-10-CM

## 2024-12-30 DIAGNOSIS — R11.0 NAUSEA IN ADULT: ICD-10-CM

## 2024-12-30 DIAGNOSIS — R30.0 DYSURIA: Primary | ICD-10-CM

## 2024-12-30 LAB
ALBUMIN SERPL-MCNC: 4.2 G/DL (ref 3.5–5.2)
ALBUMIN/GLOB SERPL: 1.4 G/DL
ALP SERPL-CCNC: 101 U/L (ref 39–117)
ALT SERPL W P-5'-P-CCNC: 78 U/L (ref 1–41)
ANION GAP SERPL CALCULATED.3IONS-SCNC: 11.6 MMOL/L (ref 5–15)
AST SERPL-CCNC: 43 U/L (ref 1–40)
BASOPHILS # BLD AUTO: 0.12 10*3/MM3 (ref 0–0.2)
BASOPHILS NFR BLD AUTO: 1.1 % (ref 0–1.5)
BILIRUB BLD-MCNC: ABNORMAL MG/DL
BILIRUB SERPL-MCNC: 1.4 MG/DL (ref 0–1.2)
BUN SERPL-MCNC: 14 MG/DL (ref 6–20)
BUN/CREAT SERPL: 11.7 (ref 7–25)
C TRACH RRNA CVX QL NAA+PROBE: NOT DETECTED
CALCIUM SPEC-SCNC: 9 MG/DL (ref 8.6–10.5)
CHLORIDE SERPL-SCNC: 101 MMOL/L (ref 98–107)
CLARITY, POC: CLEAR
CO2 SERPL-SCNC: 21.4 MMOL/L (ref 22–29)
COLOR UR: ABNORMAL
CREAT SERPL-MCNC: 1.2 MG/DL (ref 0.76–1.27)
DEPRECATED RDW RBC AUTO: 40.8 FL (ref 37–54)
EGFRCR SERPLBLD CKD-EPI 2021: 82.4 ML/MIN/1.73
EOSINOPHIL # BLD AUTO: 0.18 10*3/MM3 (ref 0–0.4)
EOSINOPHIL NFR BLD AUTO: 1.6 % (ref 0.3–6.2)
ERYTHROCYTE [DISTWIDTH] IN BLOOD BY AUTOMATED COUNT: 14.1 % (ref 12.3–15.4)
ESTRADIOL SERPL HS-MCNC: <5 PG/ML
EXPIRATION DATE: ABNORMAL
GLOBULIN UR ELPH-MCNC: 3.1 GM/DL
GLUCOSE SERPL-MCNC: 91 MG/DL (ref 65–99)
GLUCOSE UR STRIP-MCNC: NEGATIVE MG/DL
HCT VFR BLD AUTO: 53.7 % (ref 37.5–51)
HGB BLD-MCNC: 17.5 G/DL (ref 13–17.7)
IMM GRANULOCYTES # BLD AUTO: 0.1 10*3/MM3 (ref 0–0.05)
IMM GRANULOCYTES NFR BLD AUTO: 0.9 % (ref 0–0.5)
KETONES UR QL: ABNORMAL
LEUKOCYTE EST, POC: NEGATIVE
LYMPHOCYTES # BLD AUTO: 3.62 10*3/MM3 (ref 0.7–3.1)
LYMPHOCYTES NFR BLD AUTO: 31.7 % (ref 19.6–45.3)
Lab: ABNORMAL
MCH RBC QN AUTO: 26.2 PG (ref 26.6–33)
MCHC RBC AUTO-ENTMCNC: 32.6 G/DL (ref 31.5–35.7)
MCV RBC AUTO: 80.5 FL (ref 79–97)
MONOCYTES # BLD AUTO: 0.79 10*3/MM3 (ref 0.1–0.9)
MONOCYTES NFR BLD AUTO: 6.9 % (ref 5–12)
N GONORRHOEA RRNA SPEC QL NAA+PROBE: NOT DETECTED
NEUTROPHILS NFR BLD AUTO: 57.8 % (ref 42.7–76)
NEUTROPHILS NFR BLD AUTO: 6.61 10*3/MM3 (ref 1.7–7)
NITRITE UR-MCNC: NEGATIVE MG/ML
NRBC BLD AUTO-RTO: 0 /100 WBC (ref 0–0.2)
PH UR: 5.5 [PH] (ref 5–8)
PLATELET # BLD AUTO: 283 10*3/MM3 (ref 140–450)
PMV BLD AUTO: 12.1 FL (ref 6–12)
POTASSIUM SERPL-SCNC: 3.9 MMOL/L (ref 3.5–5.2)
PROT SERPL-MCNC: 7.3 G/DL (ref 6–8.5)
PROT UR STRIP-MCNC: ABNORMAL MG/DL
PSA SERPL-MCNC: 0.62 NG/ML (ref 0–4)
RBC # BLD AUTO: 6.67 10*6/MM3 (ref 4.14–5.8)
RBC # UR STRIP: NEGATIVE /UL
SODIUM SERPL-SCNC: 134 MMOL/L (ref 136–145)
SP GR UR: 1.02 (ref 1–1.03)
TESTOST SERPL-MCNC: 1045 NG/DL (ref 249–836)
UROBILINOGEN UR QL: ABNORMAL
WBC NRBC COR # BLD AUTO: 11.42 10*3/MM3 (ref 3.4–10.8)

## 2024-12-30 PROCEDURE — 80053 COMPREHEN METABOLIC PANEL: CPT

## 2024-12-30 PROCEDURE — 85025 COMPLETE CBC W/AUTO DIFF WBC: CPT

## 2024-12-30 PROCEDURE — 1159F MED LIST DOCD IN RCRD: CPT | Performed by: NURSE PRACTITIONER

## 2024-12-30 PROCEDURE — 87591 N.GONORRHOEAE DNA AMP PROB: CPT | Performed by: NURSE PRACTITIONER

## 2024-12-30 PROCEDURE — 1160F RVW MEDS BY RX/DR IN RCRD: CPT | Performed by: NURSE PRACTITIONER

## 2024-12-30 PROCEDURE — 82670 ASSAY OF TOTAL ESTRADIOL: CPT

## 2024-12-30 PROCEDURE — 1126F AMNT PAIN NOTED NONE PRSNT: CPT | Performed by: NURSE PRACTITIONER

## 2024-12-30 PROCEDURE — 81003 URINALYSIS AUTO W/O SCOPE: CPT | Performed by: NURSE PRACTITIONER

## 2024-12-30 PROCEDURE — 84403 ASSAY OF TOTAL TESTOSTERONE: CPT

## 2024-12-30 PROCEDURE — 99214 OFFICE O/P EST MOD 30 MIN: CPT | Performed by: NURSE PRACTITIONER

## 2024-12-30 PROCEDURE — 87491 CHLMYD TRACH DNA AMP PROBE: CPT | Performed by: NURSE PRACTITIONER

## 2024-12-30 PROCEDURE — 84153 ASSAY OF PSA TOTAL: CPT

## 2024-12-30 PROCEDURE — 36415 COLL VENOUS BLD VENIPUNCTURE: CPT

## 2024-12-30 PROCEDURE — 87086 URINE CULTURE/COLONY COUNT: CPT | Performed by: NURSE PRACTITIONER

## 2024-12-30 RX ORDER — ONDANSETRON 8 MG/1
8 TABLET, ORALLY DISINTEGRATING ORAL EVERY 8 HOURS PRN
Qty: 60 TABLET | Refills: 3 | Status: SHIPPED | OUTPATIENT
Start: 2024-12-30

## 2024-12-30 RX ORDER — TESTOSTERONE CYPIONATE 200 MG/ML
200 INJECTION, SOLUTION INTRAMUSCULAR
Qty: 4 ML | Refills: 0 | Status: SHIPPED | OUTPATIENT
Start: 2024-12-30

## 2024-12-30 RX ORDER — MESALAMINE 800 MG/1
800 TABLET, DELAYED RELEASE ORAL 3 TIMES DAILY
Qty: 270 TABLET | Refills: 3 | Status: SHIPPED | OUTPATIENT
Start: 2024-12-30

## 2024-12-30 RX ORDER — HYDROCORTISONE ACETATE PRAMOXINE HCL 1; 1 G/100G; G/100G
CREAM TOPICAL 2 TIMES DAILY
Qty: 30 G | Refills: 1 | Status: SHIPPED | OUTPATIENT
Start: 2024-12-30

## 2024-12-30 RX ORDER — BACLOFEN 20 MG/1
20 TABLET ORAL 2 TIMES DAILY
Qty: 180 TABLET | Refills: 3 | Status: SHIPPED | OUTPATIENT
Start: 2024-12-30

## 2024-12-30 RX ORDER — NYSTATIN 100000 U/G
1 CREAM TOPICAL 2 TIMES DAILY
Qty: 30 G | Refills: 2 | Status: SHIPPED | OUTPATIENT
Start: 2024-12-30

## 2024-12-31 ENCOUNTER — TELEMEDICINE (OUTPATIENT)
Dept: ONCOLOGY | Facility: HOSPITAL | Age: 34
End: 2024-12-31
Payer: MEDICARE

## 2024-12-31 DIAGNOSIS — D72.829 LEUKOCYTOSIS, UNSPECIFIED TYPE: Primary | ICD-10-CM

## 2024-12-31 PROCEDURE — G0463 HOSPITAL OUTPT CLINIC VISIT: HCPCS | Performed by: INTERNAL MEDICINE
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
## 2025-01-01 LAB — BACTERIA SPEC AEROBE CULT: NO GROWTH

## 2025-01-13 ENCOUNTER — TELEPHONE (OUTPATIENT)
Dept: FAMILY MEDICINE CLINIC | Facility: CLINIC | Age: 35
End: 2025-01-13
Payer: OTHER GOVERNMENT

## 2025-01-13 DIAGNOSIS — R30.0 DYSURIA: Primary | ICD-10-CM

## 2025-01-30 ENCOUNTER — TELEMEDICINE (OUTPATIENT)
Dept: GASTROENTEROLOGY | Facility: CLINIC | Age: 35
End: 2025-01-30
Payer: MEDICARE

## 2025-01-30 DIAGNOSIS — R15.2 FECAL URGENCY: ICD-10-CM

## 2025-01-30 DIAGNOSIS — R19.7 DIARRHEA, UNSPECIFIED TYPE: ICD-10-CM

## 2025-01-30 DIAGNOSIS — K21.9 GASTROESOPHAGEAL REFLUX DISEASE, UNSPECIFIED WHETHER ESOPHAGITIS PRESENT: ICD-10-CM

## 2025-01-30 DIAGNOSIS — K44.9 HIATAL HERNIA: ICD-10-CM

## 2025-01-30 DIAGNOSIS — K52.831 COLLAGENOUS COLITIS: ICD-10-CM

## 2025-01-30 DIAGNOSIS — Z79.1 NSAID LONG-TERM USE: ICD-10-CM

## 2025-01-30 DIAGNOSIS — R11.2 NAUSEA AND VOMITING, UNSPECIFIED VOMITING TYPE: ICD-10-CM

## 2025-01-30 DIAGNOSIS — K58.0 IRRITABLE BOWEL SYNDROME WITH DIARRHEA: Primary | ICD-10-CM

## 2025-01-30 DIAGNOSIS — K21.9 GASTROESOPHAGEAL REFLUX DISEASE WITHOUT ESOPHAGITIS: ICD-10-CM

## 2025-01-30 RX ORDER — SUCRALFATE 1 G/1
1 TABLET ORAL 4 TIMES DAILY
Qty: 120 TABLET | Refills: 3 | Status: SHIPPED | OUTPATIENT
Start: 2025-01-30
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
## 2025-01-31 ENCOUNTER — OFFICE VISIT (OUTPATIENT)
Dept: PODIATRY | Facility: CLINIC | Age: 35
End: 2025-01-31
Payer: MEDICARE

## 2025-01-31 VITALS
DIASTOLIC BLOOD PRESSURE: 84 MMHG | HEART RATE: 66 BPM | WEIGHT: 315 LBS | BODY MASS INDEX: 40.43 KG/M2 | TEMPERATURE: 97.7 F | HEIGHT: 74 IN | OXYGEN SATURATION: 95 % | SYSTOLIC BLOOD PRESSURE: 126 MMHG

## 2025-01-31 DIAGNOSIS — L60.0 INGROWN TOENAIL: Primary | ICD-10-CM
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
## 2025-02-15 DIAGNOSIS — R79.89 LOW TESTOSTERONE IN MALE: ICD-10-CM

## 2025-02-17 DIAGNOSIS — M25.552 BILATERAL HIP PAIN: ICD-10-CM

## 2025-02-17 DIAGNOSIS — M25.551 BILATERAL HIP PAIN: ICD-10-CM

## 2025-02-17 RX ORDER — TESTOSTERONE CYPIONATE 200 MG/ML
200 INJECTION, SOLUTION INTRAMUSCULAR
Qty: 4 ML | Refills: 0 | Status: SHIPPED | OUTPATIENT
Start: 2025-02-17

## 2025-02-19 ENCOUNTER — OFFICE VISIT (OUTPATIENT)
Dept: FAMILY MEDICINE CLINIC | Facility: CLINIC | Age: 35
End: 2025-02-19
Payer: OTHER GOVERNMENT

## 2025-02-19 VITALS
DIASTOLIC BLOOD PRESSURE: 79 MMHG | SYSTOLIC BLOOD PRESSURE: 110 MMHG | HEART RATE: 108 BPM | WEIGHT: 315 LBS | OXYGEN SATURATION: 99 % | HEIGHT: 74 IN | BODY MASS INDEX: 40.43 KG/M2

## 2025-02-19 DIAGNOSIS — J10.00 PNEUMONIA DUE TO INFLUENZA A VIRUS: Primary | ICD-10-CM

## 2025-02-19 DIAGNOSIS — R79.89 LOW TESTOSTERONE IN MALE: ICD-10-CM

## 2025-02-19 DIAGNOSIS — E55.9 VITAMIN D DEFICIENCY: ICD-10-CM

## 2025-02-19 DIAGNOSIS — R06.2 WHEEZES: ICD-10-CM

## 2025-02-19 PROBLEM — M54.16 LUMBAR RADICULOPATHY: Status: ACTIVE | Noted: 2023-08-25

## 2025-02-19 PROBLEM — M54.2 NECK PAIN: Status: ACTIVE | Noted: 2019-12-09

## 2025-02-19 PROBLEM — G43.909 MIGRAINE: Status: ACTIVE | Noted: 2020-10-22

## 2025-02-19 PROBLEM — E66.3 OVERWEIGHT: Status: ACTIVE | Noted: 2023-10-12

## 2025-02-19 PROBLEM — F34.1 DYSTHYMIA: Status: ACTIVE | Noted: 2020-10-22

## 2025-02-19 PROBLEM — M47.817 LUMBOSACRAL SPONDYLOSIS WITHOUT MYELOPATHY: Status: ACTIVE | Noted: 2023-10-25

## 2025-02-19 LAB
25(OH)D3 SERPL-MCNC: 31.3 NG/ML (ref 30–100)
AMPHET+METHAMPHET UR QL: NEGATIVE
AMPHETAMINE INTERNAL CONTROL: NORMAL
AMPHETAMINES UR QL: NEGATIVE
BARBITURATE INTERNAL CONTROL: NORMAL
BARBITURATES UR QL SCN: NEGATIVE
BASOPHILS # BLD AUTO: 0.04 10*3/MM3 (ref 0–0.2)
BASOPHILS NFR BLD AUTO: 0.5 % (ref 0–1.5)
BENZODIAZ UR QL SCN: NEGATIVE
BENZODIAZEPINE INTERNAL CONTROL: NORMAL
BUPRENORPHINE INTERNAL CONTROL: NORMAL
BUPRENORPHINE SERPL-MCNC: NEGATIVE NG/ML
CANNABINOIDS SERPL QL: NEGATIVE
COCAINE INTERNAL CONTROL: NORMAL
COCAINE UR QL: NEGATIVE
DEPRECATED RDW RBC AUTO: 43.5 FL (ref 37–54)
EOSINOPHIL # BLD AUTO: 0.15 10*3/MM3 (ref 0–0.4)
EOSINOPHIL NFR BLD AUTO: 1.8 % (ref 0.3–6.2)
ERYTHROCYTE [DISTWIDTH] IN BLOOD BY AUTOMATED COUNT: 15.2 % (ref 12.3–15.4)
EXPIRATION DATE: NORMAL
HCT VFR BLD AUTO: 54.7 % (ref 37.5–51)
HGB BLD-MCNC: 17.2 G/DL (ref 13–17.7)
IMM GRANULOCYTES # BLD AUTO: 0.04 10*3/MM3 (ref 0–0.05)
IMM GRANULOCYTES NFR BLD AUTO: 0.5 % (ref 0–0.5)
LYMPHOCYTES # BLD AUTO: 2.06 10*3/MM3 (ref 0.7–3.1)
LYMPHOCYTES NFR BLD AUTO: 25.1 % (ref 19.6–45.3)
Lab: NORMAL
MCH RBC QN AUTO: 25.7 PG (ref 26.6–33)
MCHC RBC AUTO-ENTMCNC: 31.4 G/DL (ref 31.5–35.7)
MCV RBC AUTO: 81.8 FL (ref 79–97)
MDMA (ECSTASY) INTERNAL CONTROL: NORMAL
MDMA UR QL SCN: NEGATIVE
METHADONE INTERNAL CONTROL: NORMAL
METHADONE UR QL SCN: NEGATIVE
METHAMPHETAMINE INTERNAL CONTROL: NORMAL
MONOCYTES # BLD AUTO: 0.56 10*3/MM3 (ref 0.1–0.9)
MONOCYTES NFR BLD AUTO: 6.8 % (ref 5–12)
MORPHINE INTERNAL CONTROL: NORMAL
MORPHINE/OPIATES SCREEN, URINE: NEGATIVE
NEUTROPHILS NFR BLD AUTO: 5.36 10*3/MM3 (ref 1.7–7)
NEUTROPHILS NFR BLD AUTO: 65.3 % (ref 42.7–76)
NRBC BLD AUTO-RTO: 0 /100 WBC (ref 0–0.2)
OXYCODONE INTERNAL CONTROL: NORMAL
OXYCODONE UR QL SCN: NEGATIVE
PCP UR QL SCN: NEGATIVE
PHENCYCLIDINE INTERNAL CONTROL: NORMAL
PLATELET # BLD AUTO: 241 10*3/MM3 (ref 140–450)
PMV BLD AUTO: 12.8 FL (ref 6–12)
RBC # BLD AUTO: 6.69 10*6/MM3 (ref 4.14–5.8)
TESTOST SERPL-MCNC: 1497 NG/DL (ref 249–836)
THC INTERNAL CONTROL: NORMAL
WBC NRBC COR # BLD AUTO: 8.21 10*3/MM3 (ref 3.4–10.8)

## 2025-02-19 PROCEDURE — 84403 ASSAY OF TOTAL TESTOSTERONE: CPT | Performed by: NURSE PRACTITIONER

## 2025-02-19 PROCEDURE — 82306 VITAMIN D 25 HYDROXY: CPT | Performed by: NURSE PRACTITIONER

## 2025-02-19 PROCEDURE — 99214 OFFICE O/P EST MOD 30 MIN: CPT | Performed by: NURSE PRACTITIONER

## 2025-02-19 PROCEDURE — 36415 COLL VENOUS BLD VENIPUNCTURE: CPT | Performed by: NURSE PRACTITIONER

## 2025-02-19 PROCEDURE — 96372 THER/PROPH/DIAG INJ SC/IM: CPT | Performed by: NURSE PRACTITIONER

## 2025-02-19 PROCEDURE — 80305 DRUG TEST PRSMV DIR OPT OBS: CPT | Performed by: NURSE PRACTITIONER

## 2025-02-19 PROCEDURE — 85025 COMPLETE CBC W/AUTO DIFF WBC: CPT | Performed by: NURSE PRACTITIONER

## 2025-02-19 RX ORDER — METHYLPREDNISOLONE ACETATE 80 MG/ML
80 INJECTION, SUSPENSION INTRA-ARTICULAR; INTRALESIONAL; INTRAMUSCULAR; SOFT TISSUE ONCE
Status: COMPLETED | OUTPATIENT
Start: 2025-02-19 | End: 2025-02-19

## 2025-02-19 RX ORDER — TRAMADOL HYDROCHLORIDE 50 MG/1
50 TABLET ORAL EVERY 8 HOURS PRN
Qty: 270 TABLET | Refills: 1 | Status: SHIPPED | OUTPATIENT
Start: 2025-02-19

## 2025-02-19 RX ORDER — AZITHROMYCIN 250 MG/1
TABLET, FILM COATED ORAL
Qty: 6 TABLET | Refills: 0 | Status: SHIPPED | OUTPATIENT
Start: 2025-02-19

## 2025-02-19 RX ADMIN — METHYLPREDNISOLONE ACETATE 80 MG: 80 INJECTION, SUSPENSION INTRA-ARTICULAR; INTRALESIONAL; INTRAMUSCULAR; SOFT TISSUE at 12:32

## 2025-02-19 NOTE — PROGRESS NOTES
Chief Complaint  Follow-up (Urgent care on 2/15 flu A)    Subjective          Thai Ball is a 34 y.o. male who presents to [facility]    History of Present Illness    Treated for Flu A a week ago, still has chest congestion and a little chest tightness.    11/24 360, now 324 on Semaglutide 0.25 mg which tolerated well but with the 5 mg developed diarrhea, nausea, stomach pain .  It would happen right after he took the injection.      Carafate causes him to get an intense migraine.    PHQ-2 Total Score:     PHQ-9 Total Score:       Review of Systems   Respiratory:  Positive for cough, chest tightness and shortness of breath.         Medical History: has a past medical history of Asthma (Jan 2022), Chronic pain, Coronary artery disease (Jan 2022), Depression, Fibromyalgia, Hernia (July 2023), Ingrown toenail (9/4/2024), Irritable bowel syndrome (Jan 2022), Kidney stone, Migraine, Nausea and vomiting (02/08/2023), and Vitamin D deficiency.     Surgical History: has a past surgical history that includes Neck surgery; Esophagogastroduodenoscopy (N/A, 5/25/2023); Colonoscopy (N/A, 5/25/2023); Cholecystectomy (N/A, 12/6/2023); Esophagogastroduodenoscopy (N/A, 5/17/2024); and Colonoscopy (N/A, 5/17/2024).     Family History: family history includes Inflammatory bowel disease in his mother.     Social History: reports that he has never smoked. He has never been exposed to tobacco smoke. He has quit using smokeless tobacco.  His smokeless tobacco use included chew. He reports that he does not drink alcohol and does not use drugs.    Allergies: Prochlorperazine, Carafate [sucralfate], and Reglan [metoclopramide]      Health Maintenance Due   Topic Date Due    COVID-19 Vaccine (3 - 2024-25 season) 09/01/2024            Current Outpatient Medications:     albuterol sulfate  (90 Base) MCG/ACT inhaler, Inhale 2 puffs Every 6 (Six) Hours As Needed for Wheezing or Shortness of Air.,  Disp: 18 g, Rfl: 0    anastrozole (ARIMIDEX) 1 MG tablet, Take 1 tablet by mouth 3 (Three) Times a Week., Disp: 36 tablet, Rfl: 0    baclofen (LIORESAL) 20 MG tablet, Take 1 tablet by mouth 2 (Two) Times a Day., Disp: 180 tablet, Rfl: 3    brompheniramine-pseudoephedrine-DM 30-2-10 MG/5ML syrup, Take 10 mL by mouth 4 (Four) Times a Day As Needed for Congestion or Cough., Disp: 118 mL, Rfl: 0    celecoxib (CeleBREX) 200 MG capsule, Take 1 capsule by mouth Daily., Disp: 90 capsule, Rfl: 3    dicyclomine (BENTYL) 20 MG tablet, TAKE 1 TABLET BY MOUTH EVERY 6 HOURS AS NEEDED, Disp: 360 tablet, Rfl: 1    galcanezumab-gnlm (Emgality) 120 MG/ML auto-injector pen, Emgality Pen 120 mg/mL subcutaneous pen injector  ADMINISTER 1 ML UNDER THE SKIN EVERY MONTH AS DIRECTED (90 day supply), Disp: , Rfl:     Hydrocortisone Ace-Pramoxine 1-1 % rectal cream, Insert  into the rectum 2 (Two) Times a Day., Disp: 30 g, Rfl: 1    hydrOXYzine (ATARAX) 50 MG tablet, , Disp: , Rfl:     mesalamine (ASACOL) 800 MG EC tablet, Take 1 tablet by mouth 3 (Three) Times a Day., Disp: 270 tablet, Rfl: 3    nystatin (MYCOSTATIN) 040381 UNIT/GM cream, Apply 1 Application topically to the appropriate area as directed 2 (Two) Times a Day., Disp: 30 g, Rfl: 2    ondansetron ODT (ZOFRAN-ODT) 8 MG disintegrating tablet, Place 1 tablet on the tongue Every 8 (Eight) Hours As Needed for Nausea., Disp: 60 tablet, Rfl: 3    oseltamivir (TAMIFLU) 75 MG capsule, Take 1 capsule by mouth 2 (Two) Times a Day., Disp: 10 capsule, Rfl: 0    pantoprazole (PROTONIX) 40 MG EC tablet, TAKE 1 TABLET BY MOUTH TWICE A DAY, Disp: 60 tablet, Rfl: 2    pregabalin (LYRICA) 200 MG capsule, Take 1 capsule by mouth 2 (Two) Times a Day., Disp: 60 capsule, Rfl: 1    Semaglutide-Weight Management 0.5 MG/0.5ML solution auto-injector, Inject 0.5 mL under the skin into the appropriate area as directed 1 (One) Time Per Week. Please fill with compounded semaglutide, Disp: 2 mL, Rfl: 1     "spironolactone (ALDACTONE) 50 MG tablet, Take 1 tablet by mouth Daily., Disp: 90 tablet, Rfl: 1    tadalafil (CIALIS) 10 MG tablet, Take 1 tablet by mouth Daily As Needed for Erectile Dysfunction., Disp: 90 tablet, Rfl: 1    Testosterone Cypionate (DEPOTESTOTERONE CYPIONATE) 200 MG/ML injection, INJECT 1 ML INTO THE APPROPRIATE MUSCLE AS DIRECTED BY PRESCRIBER EVERY 7 (SEVEN) DAYS., Disp: 4 mL, Rfl: 0    traMADol (ULTRAM) 50 MG tablet, Take 1 tablet by mouth Every 8 (Eight) Hours As Needed for Moderate Pain., Disp: 270 tablet, Rfl: 1    Ubrelvy 100 MG tablet, Take 1 tablet by mouth 1 (One) Time As Needed., Disp: , Rfl:     azithromycin (Zithromax Z-Noah) 250 MG tablet, Take 2 tablets by mouth on day 1, then 1 tablet daily on days 2-5, Disp: 6 tablet, Rfl: 0    Current Facility-Administered Medications:     methylPREDNISolone acetate (DEPO-medrol) injection 80 mg, 80 mg, Intramuscular, Once, Fabien, PILLO Garcia      Immunization History   Administered Date(s) Administered    COVID-19 (UNSPECIFIED) 05/25/2021, 06/15/2021    Fluzone (or Fluarix & Flulaval for VFC) >6mos 01/13/2023    Influenza Injectable Mdck Pf Quad 01/13/2023    TD Preservative Free (Tenivac) 01/13/2023         Objective       Vitals:    02/19/25 1100   BP: 110/79   Pulse: 108   SpO2: 99%   Weight: (!) 147 kg (324 lb)   Height: 188 cm (74.02\")      Body mass index is 41.58 kg/m².   Wt Readings from Last 3 Encounters:   02/19/25 (!) 147 kg (324 lb)   01/31/25 (!) 152 kg (334 lb)   12/30/24 (!) 154 kg (340 lb 9.6 oz)      BP Readings from Last 3 Encounters:   02/19/25 110/79   02/15/25 138/94   01/31/25 126/84                Physical Exam  Vitals reviewed.   Constitutional:       Appearance: Normal appearance.   Cardiovascular:      Rate and Rhythm: Normal rate and regular rhythm.      Pulses: Normal pulses.      Heart sounds: Normal heart sounds.   Pulmonary:      Effort: Pulmonary effort is normal.      Breath sounds: Wheezing and rhonchi " present.   Skin:     General: Skin is warm and dry.   Neurological:      Mental Status: He is alert and oriented to person, place, and time.   Psychiatric:         Mood and Affect: Mood normal.         Behavior: Behavior normal.         Thought Content: Thought content normal.         Judgment: Judgment normal.             Result Review :       Common labs          11/12/2024    16:11 12/6/2024    10:27 12/30/2024    10:46   Common Labs   Glucose  124  91    BUN  15  14    Creatinine  0.98  1.20    Sodium  138  134    Potassium  4.0  3.9    Chloride  102  101    Calcium  9.0  9.0    Albumin  4.2  4.2    Total Bilirubin  1.0  1.4    Alkaline Phosphatase  103  101    AST (SGOT)  37  43    ALT (SGPT)  51  78    WBC 15.84  13.14  11.42    Hemoglobin 16.5  15.8  17.5    Hematocrit 49.9  49.8  53.7    Platelets 352  316  283    Hemoglobin A1C 5.50      PSA   0.619                       Assessment and Plan        Diagnoses and all orders for this visit:    1. Pneumonia due to influenza A virus (Primary)  -     azithromycin (Zithromax Z-Noah) 250 MG tablet; Take 2 tablets by mouth on day 1, then 1 tablet daily on days 2-5  Dispense: 6 tablet; Refill: 0    2. Wheezes  -     methylPREDNISolone acetate (DEPO-medrol) injection 80 mg    3. Low testosterone in male  -     POC Medline 12 Panel Urine Drug Screen  -     Testosterone  -     CBC & Differential    4. Vitamin D deficiency  -     Vitamin D,25-Hydroxy          Follow Up     Return in about 3 months (around 5/19/2025), or if symptoms worsen or fail to improve.    Patient was given instructions and counseling regarding his condition or for health maintenance advice. Please see specific information pulled into the AVS if appropriate. Patient understands the importance of having any ordered tests to be performed in a timely fashion.      I spent 11 minutes caring for Thai on this date of service. This time includes time spent by me in the following activities: preparing for  the visit, reviewing tests, performing a medically appropriate examination and/or evaluation, counseling and educating the patient/family/caregiver, referring and communicating with other health care professionals, documenting information in the medical record, independently interpreting results and communicating that information with the patient/family/caregiver, care coordination, ordering medications, and ordering test(s)      Stacy Conn, APRN

## 2025-02-23 DIAGNOSIS — K58.0 IRRITABLE BOWEL SYNDROME WITH DIARRHEA: ICD-10-CM

## 2025-02-24 ENCOUNTER — TELEPHONE (OUTPATIENT)
Dept: UROLOGY | Age: 35
End: 2025-02-24

## 2025-02-24 RX ORDER — BUDESONIDE 3 MG/1
CAPSULE, COATED PELLETS ORAL
Qty: 270 CAPSULE | Refills: 0 | OUTPATIENT
Start: 2025-02-24

## 2025-02-24 NOTE — TELEPHONE ENCOUNTER
Caller: LOAN NGUYEN    Relationship:  SELF    Best call back number: [phone]    PATIENT CALLED REQUESTING TO CANCEL SAME DAY APPT.    Did the patient call AFTER the start time of their scheduled appointment?  []YES  [x]NO    Was the patient's appointment rescheduled? [x]YES  []NO

## 2025-02-25 PROCEDURE — 87491 CHLMYD TRACH DNA AMP PROBE: CPT | Performed by: NURSE PRACTITIONER

## 2025-02-25 PROCEDURE — 87661 TRICHOMONAS VAGINALIS AMPLIF: CPT | Performed by: NURSE PRACTITIONER

## 2025-02-25 PROCEDURE — 87086 URINE CULTURE/COLONY COUNT: CPT | Performed by: NURSE PRACTITIONER

## 2025-02-25 PROCEDURE — 87591 N.GONORRHOEAE DNA AMP PROB: CPT | Performed by: NURSE PRACTITIONER

## 2025-02-25 PROCEDURE — 87109 MYCOPLASMA: CPT | Performed by: NURSE PRACTITIONER
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
## 2025-02-26 ENCOUNTER — TELEPHONE (OUTPATIENT)
Dept: UROLOGY | Age: 35
End: 2025-02-26

## 2025-02-26 ENCOUNTER — OFFICE VISIT (OUTPATIENT)
Dept: UROLOGY | Age: 35
End: 2025-02-26
Payer: MEDICARE

## 2025-02-26 VITALS — RESPIRATION RATE: 12 BRPM | HEIGHT: 74 IN | BODY MASS INDEX: 40.43 KG/M2 | WEIGHT: 315 LBS

## 2025-02-26 DIAGNOSIS — E34.9 HYPOTESTOSTERONEMIA: ICD-10-CM

## 2025-02-26 DIAGNOSIS — R10.2 PERINEAL PAIN: ICD-10-CM

## 2025-02-26 DIAGNOSIS — N53.12 PAINFUL EJACULATION: ICD-10-CM

## 2025-02-26 DIAGNOSIS — R30.0 DYSURIA: Primary | ICD-10-CM

## 2025-02-26 DIAGNOSIS — N48.30 PAINFUL ERECTION: ICD-10-CM

## 2025-02-26 DIAGNOSIS — Z87.442 HISTORY OF KIDNEY STONES: ICD-10-CM

## 2025-02-26 DIAGNOSIS — F52.4 PREMATURE EJACULATION: ICD-10-CM

## 2025-02-26 DIAGNOSIS — N52.9 ERECTILE DYSFUNCTION, UNSPECIFIED ERECTILE DYSFUNCTION TYPE: ICD-10-CM

## 2025-02-26 LAB
BILIRUB BLD-MCNC: ABNORMAL MG/DL
CLARITY, POC: ABNORMAL
COLOR UR: ABNORMAL
EXPIRATION DATE: ABNORMAL
GLUCOSE UR STRIP-MCNC: ABNORMAL MG/DL
KETONES UR QL: ABNORMAL
LEUKOCYTE EST, POC: NEGATIVE
Lab: ABNORMAL
NITRITE UR-MCNC: POSITIVE MG/ML
PH UR: 5.5 [PH] (ref 5–8)
PROT UR STRIP-MCNC: ABNORMAL MG/DL
RBC # UR STRIP: NEGATIVE /UL
SP GR UR: 1.02 (ref 1–1.03)
URINE VOLUME: 0
UROBILINOGEN UR QL: ABNORMAL

## 2025-02-26 RX ORDER — PHENAZOPYRIDINE HYDROCHLORIDE 200 MG/1
200 TABLET, FILM COATED ORAL 3 TIMES DAILY PRN
Qty: 20 TABLET | Refills: 0 | Status: SHIPPED | OUTPATIENT
Start: 2025-02-26

## 2025-02-26 RX ORDER — DOXYCYCLINE 100 MG/1
100 CAPSULE ORAL 2 TIMES DAILY
Qty: 36 CAPSULE | Refills: 0 | Status: SHIPPED | OUTPATIENT
Start: 2025-02-26 | End: 2025-03-16

## 2025-02-26 NOTE — TELEPHONE ENCOUNTER
Patient was called and given the information of the endrocrinology referral.  Records and referral was faxed to Dr. Gio Jarrell office at [phone].  The office will call patient within 48 hours with appointment.

## 2025-02-28 ENCOUNTER — TELEPHONE (OUTPATIENT)
Dept: GASTROENTEROLOGY | Facility: CLINIC | Age: 35
End: 2025-02-28

## 2025-02-28 ENCOUNTER — TELEPHONE (OUTPATIENT)
Dept: FAMILY MEDICINE CLINIC | Facility: CLINIC | Age: 35
End: 2025-02-28

## 2025-02-28 DIAGNOSIS — K58.0 IRRITABLE BOWEL SYNDROME WITH DIARRHEA: Primary | ICD-10-CM

## 2025-02-28 RX ORDER — BUDESONIDE 3 MG/1
CAPSULE, COATED PELLETS ORAL
Qty: 154 CAPSULE | Refills: 0 | Status: SHIPPED | OUTPATIENT
Start: 2025-02-28 | End: 2025-05-29

## 2025-02-28 NOTE — TELEPHONE ENCOUNTER
Hub staff attempted to follow warm transfer process and was unsuccessful     Caller: Thai Ball    Relationship to patient: Self    Best call back number: [phone]     Patient is needing: PT TAKING DOXYCYCLINE AT THE MOMENT AND IS JUST WANTING TO KNOW IF THAT MEDICATION AND BUDESONIDE ARE OKAY TO TAKE TOGETHER. HE IS ALSO NEEDING THE TAPER SCHEDULE FOR THE BUDESONIDE. PT REPORTS ALSO TAKING A PROBIOTIC AS WELL

## 2025-02-28 NOTE — TELEPHONE ENCOUNTER
Caller: Thai Ball    Relationship to patient: Self    Best call back number: [phone]     Patient is needing: PATIENT IS REQUESTING A CALLBACK FROM [facility]. HE HAS A MEDICATION QUESTION. PLEASE CALL AND ADVISE.  THE MEDICATION IS CALLED MESALAMINE.

## 2025-02-28 NOTE — TELEPHONE ENCOUNTER
Patient called stating he switched insurance and this medication is now $150. Patient is requesting recommendations or new medication.

## 2025-03-10 DIAGNOSIS — K21.9 GASTROESOPHAGEAL REFLUX DISEASE WITHOUT ESOPHAGITIS: ICD-10-CM

## 2025-03-10 DIAGNOSIS — K44.9 HIATAL HERNIA WITH GERD: ICD-10-CM

## 2025-03-10 DIAGNOSIS — K21.9 HIATAL HERNIA WITH GERD: ICD-10-CM

## 2025-03-10 RX ORDER — PANTOPRAZOLE SODIUM 40 MG/1
40 TABLET, DELAYED RELEASE ORAL 2 TIMES DAILY
Qty: 180 TABLET | Refills: 3 | Status: SHIPPED | OUTPATIENT
Start: 2025-03-10

## 2025-03-17 ENCOUNTER — TELEPHONE (OUTPATIENT)
Dept: FAMILY MEDICINE CLINIC | Facility: CLINIC | Age: 35
End: 2025-03-17
Payer: MEDICARE

## 2025-03-17 DIAGNOSIS — A09 DIARRHEA OF INFECTIOUS ORIGIN: ICD-10-CM

## 2025-03-17 DIAGNOSIS — A04.72 CLOSTRIDIUM DIFFICILE DIARRHEA: Primary | ICD-10-CM

## 2025-03-24 DIAGNOSIS — A04.72 CLOSTRIDIUM DIFFICILE DIARRHEA: ICD-10-CM

## 2025-03-24 DIAGNOSIS — A09 DIARRHEA OF INFECTIOUS ORIGIN: ICD-10-CM

## 2025-03-25 DIAGNOSIS — R79.89 LOW TESTOSTERONE IN MALE: ICD-10-CM

## 2025-03-26 ENCOUNTER — LAB (OUTPATIENT)
Dept: LAB | Facility: HOSPITAL | Age: 35
End: 2025-03-26
Payer: MEDICARE

## 2025-03-26 LAB
027 TOXIN: NORMAL
C DIFF TOX GENS STL QL NAA+PROBE: NEGATIVE

## 2025-03-26 PROCEDURE — 87493 C DIFF AMPLIFIED PROBE: CPT | Performed by: NURSE PRACTITIONER

## 2025-03-26 RX ORDER — TESTOSTERONE CYPIONATE 200 MG/ML
200 INJECTION, SOLUTION INTRAMUSCULAR
Qty: 4 ML | Refills: 0 | Status: SHIPPED | OUTPATIENT
Start: 2025-03-26

## 2025-03-27 ENCOUNTER — TELEPHONE (OUTPATIENT)
Dept: GASTROENTEROLOGY | Facility: CLINIC | Age: 35
End: 2025-03-27
Payer: MEDICARE

## 2025-03-27 DIAGNOSIS — K21.9 GASTROESOPHAGEAL REFLUX DISEASE WITHOUT ESOPHAGITIS: Primary | ICD-10-CM

## 2025-03-27 RX ORDER — VONOPRAZAN FUMARATE 26.72 MG/1
20 TABLET ORAL DAILY
Qty: 30 TABLET | Refills: 1 | Status: SHIPPED | OUTPATIENT
Start: 2025-03-27

## 2025-03-27 NOTE — TELEPHONE ENCOUNTER
Spoke to patient today. Patient agreeable to start Voquezna. Patient will come by the office today to  samples. Sending rx to pharmacy.

## 2025-03-28 DIAGNOSIS — Z91.09 OTHER ALLERGY STATUS, OTHER THAN TO DRUGS AND BIOLOGICAL SUBSTANCES: ICD-10-CM

## 2025-03-28 DIAGNOSIS — K58.0 IRRITABLE BOWEL SYNDROME WITH DIARRHEA: ICD-10-CM

## 2025-03-28 DIAGNOSIS — R19.7 DIARRHEA, UNSPECIFIED TYPE: Primary | ICD-10-CM

## 2025-03-28 RX ORDER — VONOPRAZAN FUMARATE 26.72 MG/1
20 TABLET ORAL DAILY
Qty: 10 TABLET | Refills: 0 | COMMUNITY
Start: 2025-03-28

## 2025-04-03 ENCOUNTER — LAB (OUTPATIENT)
Dept: LAB | Facility: HOSPITAL | Age: 35
End: 2025-04-03
Payer: MEDICARE

## 2025-04-03 DIAGNOSIS — Z91.09 OTHER ALLERGY STATUS, OTHER THAN TO DRUGS AND BIOLOGICAL SUBSTANCES: ICD-10-CM

## 2025-04-03 DIAGNOSIS — R19.7 DIARRHEA, UNSPECIFIED TYPE: ICD-10-CM

## 2025-04-03 DIAGNOSIS — K58.0 IRRITABLE BOWEL SYNDROME WITH DIARRHEA: ICD-10-CM

## 2025-04-03 PROCEDURE — 36415 COLL VENOUS BLD VENIPUNCTURE: CPT

## 2025-04-03 PROCEDURE — 82785 ASSAY OF IGE: CPT

## 2025-04-03 PROCEDURE — 86003 ALLG SPEC IGE CRUDE XTRC EA: CPT

## 2025-04-03 PROCEDURE — 86008 ALLG SPEC IGE RECOMB EA: CPT

## 2025-04-09 DIAGNOSIS — R79.89 LOW TESTOSTERONE IN MALE: ICD-10-CM

## 2025-04-09 RX ORDER — TESTOSTERONE CYPIONATE 200 MG/ML
200 INJECTION, SOLUTION INTRAMUSCULAR
Qty: 4 ML | Refills: 0 | Status: CANCELLED | OUTPATIENT
Start: 2025-04-09
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
## 2025-04-11 ENCOUNTER — OFFICE VISIT (OUTPATIENT)
Dept: UROLOGY | Age: 35
End: 2025-04-11
Payer: MEDICARE

## 2025-04-11 VITALS — HEIGHT: 74 IN | WEIGHT: 315 LBS | BODY MASS INDEX: 40.43 KG/M2 | RESPIRATION RATE: 20 BRPM

## 2025-04-11 DIAGNOSIS — R39.89 URETHRAL PAIN: Primary | ICD-10-CM

## 2025-04-11 DIAGNOSIS — Z87.442 HISTORY OF KIDNEY STONES: ICD-10-CM

## 2025-04-11 DIAGNOSIS — R39.11 URINARY HESITANCY: ICD-10-CM

## 2025-04-11 DIAGNOSIS — N53.19 ABNORMAL EJACULATION: ICD-10-CM

## 2025-04-11 DIAGNOSIS — N53.12 PAINFUL EJACULATION: ICD-10-CM

## 2025-04-11 DIAGNOSIS — R39.198 SLOW URINARY STREAM: ICD-10-CM

## 2025-04-11 DIAGNOSIS — N52.9 ERECTILE DYSFUNCTION, UNSPECIFIED ERECTILE DYSFUNCTION TYPE: ICD-10-CM

## 2025-04-11 LAB — SPECIMEN VOL SMN: NORMAL ML

## 2025-04-11 PROCEDURE — 99213 OFFICE O/P EST LOW 20 MIN: CPT | Performed by: NURSE PRACTITIONER

## 2025-04-11 PROCEDURE — 1159F MED LIST DOCD IN RCRD: CPT | Performed by: NURSE PRACTITIONER

## 2025-04-11 PROCEDURE — 1160F RVW MEDS BY RX/DR IN RCRD: CPT | Performed by: NURSE PRACTITIONER

## 2025-04-11 RX ORDER — TAMSULOSIN HYDROCHLORIDE 0.4 MG/1
1 CAPSULE ORAL DAILY
Qty: 90 CAPSULE | Refills: 4 | Status: SHIPPED | OUTPATIENT
Start: 2025-04-11

## 2025-04-14 LAB
ALPHA-GAL IGE QN: <0.1 KU/L
BEEF IGE QN: <0.1 KU/L
CONV CLASS DESCRIPTION: NORMAL
IGE SERPL-ACNC: 90 IU/ML (ref 6–495)
LAMB IGE QN: <0.1 KU/L
PORK IGE QN: <0.1 KU/L

## 2025-04-23 DIAGNOSIS — R79.89 LOW TESTOSTERONE IN MALE: Primary | ICD-10-CM

## 2025-04-27 DIAGNOSIS — K58.0 IRRITABLE BOWEL SYNDROME WITH DIARRHEA: ICD-10-CM

## 2025-04-28 RX ORDER — BUDESONIDE 3 MG/1
CAPSULE, COATED PELLETS ORAL
Qty: 154 CAPSULE | Refills: 0 | OUTPATIENT
Start: 2025-04-28

## 2025-05-01 ENCOUNTER — LAB (OUTPATIENT)
Dept: LAB | Facility: HOSPITAL | Age: 35
End: 2025-05-01
Payer: MEDICARE

## 2025-05-01 DIAGNOSIS — R79.89 LOW TESTOSTERONE IN MALE: ICD-10-CM

## 2025-05-01 LAB
HCT VFR BLD AUTO: 49.2 % (ref 37.5–51)
HGB BLD-MCNC: 15.8 G/DL (ref 13–17.7)

## 2025-05-01 PROCEDURE — 85014 HEMATOCRIT: CPT

## 2025-05-01 PROCEDURE — 84403 ASSAY OF TOTAL TESTOSTERONE: CPT

## 2025-05-01 PROCEDURE — 85018 HEMOGLOBIN: CPT

## 2025-05-01 PROCEDURE — 36415 COLL VENOUS BLD VENIPUNCTURE: CPT

## 2025-05-01 PROCEDURE — 84402 ASSAY OF FREE TESTOSTERONE: CPT

## 2025-05-02 ENCOUNTER — TELEPHONE (OUTPATIENT)
Dept: FAMILY MEDICINE CLINIC | Facility: CLINIC | Age: 35
End: 2025-05-02
Payer: MEDICARE

## 2025-05-02 ENCOUNTER — RESULTS FOLLOW-UP (OUTPATIENT)
Dept: FAMILY MEDICINE CLINIC | Facility: CLINIC | Age: 35
End: 2025-05-02
Payer: MEDICARE

## 2025-05-02 ENCOUNTER — TELEPHONE (OUTPATIENT)
Dept: GASTROENTEROLOGY | Facility: CLINIC | Age: 35
End: 2025-05-02

## 2025-05-02 DIAGNOSIS — Z79.899 OTHER LONG TERM (CURRENT) DRUG THERAPY: ICD-10-CM

## 2025-05-02 DIAGNOSIS — K58.2 IRRITABLE BOWEL SYNDROME WITH BOTH CONSTIPATION AND DIARRHEA: ICD-10-CM

## 2025-05-02 DIAGNOSIS — K21.9 GASTROESOPHAGEAL REFLUX DISEASE WITHOUT ESOPHAGITIS: ICD-10-CM

## 2025-05-02 DIAGNOSIS — Z12.5 PROSTATE CANCER SCREENING: ICD-10-CM

## 2025-05-02 DIAGNOSIS — R79.89 LOW TESTOSTERONE IN MALE: Primary | ICD-10-CM

## 2025-05-02 DIAGNOSIS — Z12.5 ENCOUNTER FOR PROSTATE CANCER SCREENING: ICD-10-CM

## 2025-05-02 DIAGNOSIS — R19.7 DIARRHEA, UNSPECIFIED TYPE: ICD-10-CM

## 2025-05-02 DIAGNOSIS — R10.13 RECURRENT EPIGASTRIC ABDOMINAL PAIN: Primary | ICD-10-CM

## 2025-05-02 DIAGNOSIS — R30.0 DYSURIA: ICD-10-CM

## 2025-05-02 NOTE — TELEPHONE ENCOUNTER
Hub staff attempted to follow warm transfer process and was unsuccessful     Caller: Thai Ball    Relationship to patient: Self    Best call back number: [phone]     Patient is needing:   PATIENT RECEIVED A TEXT THAT HE NEEDS TO RESCHEDULE HIS APPOINTMENT FOR 5/9/25 WITH LYNDA REED.  NEXT AVAILABLE WITH [facility], APRN IS IN SEPT.  PATIENT STATES HE HAS WAITED 5 MONTHS FOR THIS APPOINTMENT AND DOESN'T WANT TO WAIT THAT LONG DUE TO SERIOUS ISSUES HE IS HAVING.  PLEASE CALL BACK TO ADVISE/SCHEDULE.

## 2025-05-02 NOTE — TELEPHONE ENCOUNTER
Relay     Referral has been faxed to [facility] if they have not called you within a week you can call their office at [phone]

## 2025-05-02 NOTE — TELEPHONE ENCOUNTER
Caller: Thai Ball    Relationship: Self    Best call back number: [phone]    What is the medical concern/diagnosis:     What specialty or service is being requested: GASTROENTEROLOGY     What is the provider, practice or medical service name:   [facility] OR [facility]    What is the office location: [place]     Any additional details:   PATIENT HAS  AND HAS BEEN HAVING ISSUES WITH TRYING TO GET IN WITH THE GASTROLOGY AS THE APPOINTMENTS KEEPS GETTING CANCELLED AND PUSHED BACK MONTHS AHEAD. HE WOULD LIKE TO BE SEEN ELSEWHERE, EVEN IF IT IS IN [place], IN ORDERS TO BE SEEN SOON FOR THIS ISSUE.

## 2025-05-06 LAB
TESTOST FREE SERPL-MCNC: 2.6 PG/ML (ref 8.7–25.1)
TESTOST SERPL-MCNC: 302 NG/DL (ref 264–916)

## 2025-05-06 RX ORDER — TESTOSTERONE CYPIONATE 200 MG/ML
100 INJECTION, SOLUTION INTRAMUSCULAR WEEKLY
Qty: 2 ML | Refills: 1 | Status: SHIPPED | OUTPATIENT
Start: 2025-05-06

## 2025-05-08 DIAGNOSIS — R68.89 LIGHT SENSITIVITY: Primary | ICD-10-CM

## 2025-05-20 ENCOUNTER — OFFICE VISIT (OUTPATIENT)
Dept: GASTROENTEROLOGY | Facility: CLINIC | Age: 35
End: 2025-05-20
Payer: MEDICARE

## 2025-05-20 VITALS
WEIGHT: 313 LBS | HEIGHT: 74 IN | BODY MASS INDEX: 40.17 KG/M2 | SYSTOLIC BLOOD PRESSURE: 137 MMHG | HEART RATE: 100 BPM | DIASTOLIC BLOOD PRESSURE: 89 MMHG | OXYGEN SATURATION: 100 %

## 2025-05-20 DIAGNOSIS — K58.0 IRRITABLE BOWEL SYNDROME WITH DIARRHEA: ICD-10-CM

## 2025-05-20 DIAGNOSIS — K21.9 GASTROESOPHAGEAL REFLUX DISEASE WITHOUT ESOPHAGITIS: ICD-10-CM

## 2025-05-20 DIAGNOSIS — R10.13 RECURRENT EPIGASTRIC ABDOMINAL PAIN: Primary | ICD-10-CM

## 2025-05-20 RX ORDER — ALOSETRON HYDROCHLORIDE 0.5 MG/1
0.5 TABLET ORAL 2 TIMES DAILY
Qty: 60 TABLET | Refills: 5 | Status: SHIPPED | OUTPATIENT
Start: 2025-05-20 | End: 2026-05-20
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
## 2025-05-28 ENCOUNTER — OFFICE VISIT (OUTPATIENT)
Dept: FAMILY MEDICINE CLINIC | Facility: CLINIC | Age: 35
End: 2025-05-28
Payer: MEDICARE

## 2025-05-28 VITALS
TEMPERATURE: 97.6 F | HEIGHT: 74 IN | BODY MASS INDEX: 40.43 KG/M2 | OXYGEN SATURATION: 96 % | SYSTOLIC BLOOD PRESSURE: 124 MMHG | DIASTOLIC BLOOD PRESSURE: 85 MMHG | HEART RATE: 87 BPM | WEIGHT: 315 LBS

## 2025-05-28 DIAGNOSIS — R79.89 LOW TESTOSTERONE IN MALE: ICD-10-CM

## 2025-05-28 DIAGNOSIS — Z79.899 OTHER LONG TERM (CURRENT) DRUG THERAPY: ICD-10-CM

## 2025-05-28 DIAGNOSIS — K58.0 IRRITABLE BOWEL SYNDROME WITH DIARRHEA: Primary | ICD-10-CM

## 2025-05-28 DIAGNOSIS — R10.13 RECURRENT EPIGASTRIC ABDOMINAL PAIN: ICD-10-CM

## 2025-05-28 DIAGNOSIS — K21.9 GASTROESOPHAGEAL REFLUX DISEASE WITHOUT ESOPHAGITIS: ICD-10-CM

## 2025-05-28 DIAGNOSIS — M79.7 FIBROMYALGIA: ICD-10-CM

## 2025-05-28 DIAGNOSIS — K58.2 IRRITABLE BOWEL SYNDROME WITH BOTH CONSTIPATION AND DIARRHEA: ICD-10-CM

## 2025-05-28 RX ORDER — DICYCLOMINE HCL 20 MG
20 TABLET ORAL EVERY 6 HOURS PRN
Qty: 180 TABLET | Refills: 1 | Status: SHIPPED | OUTPATIENT
Start: 2025-05-28

## 2025-05-28 NOTE — PROGRESS NOTES
Chief Complaint  Establish Care    Subjective      Thai Ball is a 34 y.o. male who presents to [facility]     History of Present Illness  The patient presents for evaluation of gastrointestinal issues, weight management, testosterone replacement therapy, and medication management.    He has been experiencing persistent gastrointestinal issues for the past 3 years, with only budesonide providing some relief. However, this treatment was discontinued due to a C. difficile infection. He describes severe abdominal pain as a burning sensation, which led to two urgent care visits where he was diagnosed with GERD. He has been managing his symptoms with probiotics Align. Mesalamine was effective. He has been diagnosed with IBS-D and continues to experience loose stools. He has undergone two colonoscopies in the past two years, which revealed microscopic colitis in two or three areas.He has been advised to discontinue budesonide and celecoxib due to their association with microscopic colitis. Viberzi was prescribed but initially did not seem to make a difference. He has been taking dicyclomine as needed, typically twice daily, to manage his symptoms.Anxiety and depression have been significant due to unresolved stomach issues. He reports experiencing gas 75% of the time and has five to six bowel movements in the morning, often with urgency and tenesmus. Baclofen has been helpful, and muscle relaxers are used one to two times daily. Tramadol is taken for hip, knee, and back pain, which also helps with stomach flare-ups.  He strongly wants to continue baclofen until GI figures out the cause of his pain.  Pregabalin is taken for neuropathy, having switched from gabapentin due to weight gain. He goes to the gym four or five times a week for two to three hours each time. Wegovy has been helpful for his stomach, but an overdose occurred when the dose was increased from 0.25 to 1.0.A break  "from these medications for three to four months is planned to see if it helps his stomach.    Testosterone is taken at 100 units weekly.  A rash on his back has been present since starting testosterone injections, initially thought to be a Staph infection but later attributed to testosterone. Dermatology has been consulted for this issue, and an upcoming appointment with endocrinology is scheduled.         Patient Care Team:  Tammy Carrington MD as PCP - General (Family Medicine)  Anirudh Rogers MD as Consulting Physician (Gastroenterology)  April Zafar APRN as Nurse Practitioner (Gastroenterology)    Review of Systems      Objective   Vital Signs:   Vitals:    05/28/25 1422   BP: 124/85   BP Location: Left arm   Patient Position: Sitting   Cuff Size: Adult   Pulse: 87   Temp: 97.6 °F (36.4 °C)   TempSrc: Temporal   SpO2: 96%   Weight: (!) 143 kg (316 lb)   Height: 188 cm (74.02\")     Body mass index is 40.55 kg/m².    Wt Readings from Last 3 Encounters:   05/28/25 (!) 143 kg (316 lb)   05/20/25 (!) 142 kg (313 lb)   04/11/25 (!) 149 kg (328 lb)     BP Readings from Last 3 Encounters:   05/28/25 124/85   05/20/25 137/89   03/16/25 132/92       Health Maintenance   Topic Date Due    COVID-19 Vaccine (3 - 2024-25 season) 06/11/2025 (Originally 9/1/2024)    INFLUENZA VACCINE  07/01/2025    ANNUAL WELLNESS VISIT  10/09/2025    TDAP/TD VACCINES (2 - Tdap) 01/13/2033    HEPATITIS C SCREENING  Completed    Pneumococcal Vaccine 0-49  Aged Out       Physical Exam  Constitutional:       Appearance: Normal appearance.   HENT:      Head: Normocephalic and atraumatic.   Cardiovascular:      Rate and Rhythm: Normal rate and regular rhythm.   Pulmonary:      Effort: Pulmonary effort is normal.      Breath sounds: Normal breath sounds.   Abdominal:      Palpations: Abdomen is soft.   Skin:     Findings: Rash present.      Comments: Erythematous rash all over the body  Postcholecystectomy scar over the abdomen "   Neurological:      General: No focal deficit present.      Mental Status: He is oriented to person, place, and time.   Psychiatric:         Mood and Affect: Mood normal.         Behavior: Behavior normal.         Physical Exam  Skin: Diffuse rash noted on the back, likely related to testosterone therapy. No signs of infection.  Other: Hypertrophic scar noted at the site of previous gallbladder surgery, no signs of infection.       Result Review   The following data was reviewed by: Tammy Carrington MD on 05/28/2025:  [x]  Tests & Results  []  Hospitalization/Emergency Department/Urgent Care  []  Internal/External Consultant Notes      Results  Labs   - Testosterone level: 05/2025, 302       ASSESSMENT/PLAN  Diagnoses and all orders for this visit:    1. Irritable bowel syndrome with diarrhea (Primary)  -     dicyclomine (BENTYL) 20 MG tablet; Take 1 tablet by mouth Every 6 (Six) Hours As Needed for Abdominal Cramping.  Dispense: 180 tablet; Refill: 1    2. Fibromyalgia    3. Low testosterone in male    4. Other long term (current) drug therapy    5. Irritable bowel syndrome with both constipation and diarrhea    6. Recurrent epigastric abdominal pain    7. Gastroesophageal reflux disease without esophagitis          Assessment & Plan  1. Gastrointestinal issues.  - Reports ongoing gastrointestinal issues, including burning pain and diarrhea, partially managed with budesonide and probiotics.  - Experienced a C. difficile infection after stopping budesonide; currently taking Align probiotics and finds them helpful.  - Will continue current regimen of probiotics and avoid NSAIDs like celecoxib  - Refill for dicyclomine (Bentyl) 180 tablets will be provided, to be taken as needed up to twice daily. A urine drug screen will be conducted today.    2. Weight management.  - Has been using Wegovy for weight management but experienced an overdose when starting at a higher dose.  - Continue dietary management, daily  exercise    3. Testosterone replacement therapy.  - Currently on testosterone injections, 100 units weekly; testosterone levels have fluctuated  - Patient has an upcoming appointment with endocrinology    4. Medication management.  - Currently taking pregabalin for neuropathy and finds it effective; will continue with current dosage.  - Taking tramadol as needed for hip and knee pain, as well as for stomach flare-ups; advised to use tramadol sparingly and only when necessary.  - Follow-up in 3 months.       Thai Ball  reports that he has never smoked. He has never been exposed to tobacco smoke. He has quit using smokeless tobacco.  His smokeless tobacco use included chew.     FOLLOW UP  Return in about 3 months (around 8/28/2025).  Patient was given instructions and counseling regarding his condition or for health maintenance advice. Please see specific information pulled into the AVS if appropriate.       Tammy Carrington MD  05/28/25  15:18 EDT    Patient or patient representative verbalized consent for the use of Ambient Listening during the visit with  Tammy Carrington MD for chart documentation. 5/28/2025  15:18 EDT

## 2025-06-02 ENCOUNTER — TELEPHONE (OUTPATIENT)
Dept: FAMILY MEDICINE CLINIC | Facility: CLINIC | Age: 35
End: 2025-06-02

## 2025-06-02 RX ORDER — BUDESONIDE 3 MG/1
CAPSULE, COATED PELLETS ORAL
Qty: 270 CAPSULE | OUTPATIENT
Start: 2025-06-02

## 2025-06-02 RX ORDER — VONOPRAZAN FUMARATE 26.72 MG/1
20 TABLET ORAL DAILY
Qty: 30 TABLET | Refills: 1 | Status: SHIPPED | OUTPATIENT
Start: 2025-06-02

## 2025-06-02 RX ORDER — BUDESONIDE 3 MG/1
9 CAPSULE, COATED PELLETS ORAL DAILY
Qty: 90 CAPSULE | Refills: 1 | Status: SHIPPED | OUTPATIENT
Start: 2025-06-02

## 2025-06-02 NOTE — TELEPHONE ENCOUNTER
Ok to refill Budesonide 3mg tab.  3 tabs po daily #90, 1 refill and voquezna.    Pt asked thru [facility]di

## 2025-06-02 NOTE — TELEPHONE ENCOUNTER
Medication Requested Voquezna     Last Refill 03/27/25    Last OV 05/20/25    Next OV 06/24/25    Medication pended for approval and correct pharmacy verified Yes

## 2025-06-02 NOTE — TELEPHONE ENCOUNTER
Caller: Thai Ball    Relationship to patient: Self      Best call back number: [phone]     Provider: TOMI     Medication PA needed: Testosterone Cypionate (Depo-Testosterone) 200 MG/ML injection     PATIENT WOULD LIKE A CALL WHEN THE PRIOR AUTHORIZATION HAS BEEN PROCESSED

## 2025-06-03 RX ORDER — VONOPRAZAN FUMARATE 26.72 MG/1
20 TABLET ORAL DAILY
Qty: 30 TABLET | Refills: 1 | OUTPATIENT
Start: 2025-06-03

## 2025-06-11 ENCOUNTER — TELEPHONE (OUTPATIENT)
Dept: FAMILY MEDICINE CLINIC | Facility: CLINIC | Age: 35
End: 2025-06-11
Payer: MEDICARE

## 2025-06-11 DIAGNOSIS — R79.89 LOW TESTOSTERONE IN MALE: ICD-10-CM

## 2025-06-11 RX ORDER — TESTOSTERONE CYPIONATE 200 MG/ML
100 INJECTION, SOLUTION INTRAMUSCULAR WEEKLY
Qty: 10 ML | Refills: 0 | Status: SHIPPED | OUTPATIENT
Start: 2025-06-11

## 2025-06-11 NOTE — TELEPHONE ENCOUNTER
Caller: LOAN NGUYEN    Relationship: SELF    Best call back number: [phone]    What is the best time to reach you: ANY     Who are you requesting to speak with (clinical staff, provider,  specific staff member):   CLINICAL     What was the call regarding: CALLER STATED THAT HE IS 2 DAYS PAST DUE FOR HIS INJECTION OF TESTOSTERONE. THE PRIOR AUTHORIZATION WAS FOR A 10 ML BOTTLE AND THE RECENT SCRIPT WAS SENT FOR A 2 ML BOTTLE SO PHARMACY CAN NOT FILL UNTIL THIS IS CORRECTED.     [id number] - [place] - [address] [facility] - [phone] [facility] [phone] FX     Is it okay if the provider responds through [facility]: YES

## 2025-06-11 NOTE — TELEPHONE ENCOUNTER
Pharmacy call stated patients Testosterone is approved for 10ML vials only. They said to please send with quantity sufficient.   Please advise.

## 2025-06-17 ENCOUNTER — TELEPHONE (OUTPATIENT)
Dept: UROLOGY | Age: 35
End: 2025-06-17
Payer: MEDICARE

## 2025-06-24 ENCOUNTER — OFFICE VISIT (OUTPATIENT)
Dept: GASTROENTEROLOGY | Facility: CLINIC | Age: 35
End: 2025-06-24
Payer: MEDICARE

## 2025-06-24 VITALS
DIASTOLIC BLOOD PRESSURE: 83 MMHG | SYSTOLIC BLOOD PRESSURE: 137 MMHG | OXYGEN SATURATION: 96 % | HEIGHT: 74 IN | HEART RATE: 102 BPM | BODY MASS INDEX: 39.53 KG/M2 | WEIGHT: 308 LBS

## 2025-06-24 DIAGNOSIS — K21.9 GASTROESOPHAGEAL REFLUX DISEASE WITHOUT ESOPHAGITIS: Primary | ICD-10-CM

## 2025-06-24 DIAGNOSIS — K58.0 IRRITABLE BOWEL SYNDROME WITH DIARRHEA: ICD-10-CM

## 2025-06-24 DIAGNOSIS — K52.831 COLLAGENOUS COLITIS: ICD-10-CM

## 2025-06-24 PROCEDURE — 99214 OFFICE O/P EST MOD 30 MIN: CPT | Performed by: INTERNAL MEDICINE
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
## 2025-07-15 DIAGNOSIS — R19.7 DIARRHEA, UNSPECIFIED TYPE: Primary | ICD-10-CM

## 2025-07-16 ENCOUNTER — LAB (OUTPATIENT)
Dept: LAB | Facility: HOSPITAL | Age: 35
End: 2025-07-16
Payer: MEDICARE

## 2025-07-16 LAB
027 TOXIN: NORMAL
C DIFF TOX GENS STL QL NAA+PROBE: NEGATIVE

## 2025-07-16 PROCEDURE — 87493 C DIFF AMPLIFIED PROBE: CPT | Performed by: STUDENT IN AN ORGANIZED HEALTH CARE EDUCATION/TRAINING PROGRAM

## 2025-07-16 PROCEDURE — 87427 SHIGA-LIKE TOXIN AG IA: CPT | Performed by: STUDENT IN AN ORGANIZED HEALTH CARE EDUCATION/TRAINING PROGRAM

## 2025-07-16 PROCEDURE — 87045 FECES CULTURE AEROBIC BACT: CPT | Performed by: STUDENT IN AN ORGANIZED HEALTH CARE EDUCATION/TRAINING PROGRAM

## 2025-07-16 PROCEDURE — 87046 STOOL CULTR AEROBIC BACT EA: CPT | Performed by: STUDENT IN AN ORGANIZED HEALTH CARE EDUCATION/TRAINING PROGRAM

## 2025-07-17 ENCOUNTER — OFFICE VISIT (OUTPATIENT)
Dept: FAMILY MEDICINE CLINIC | Facility: CLINIC | Age: 35
End: 2025-07-17
Payer: MEDICARE

## 2025-07-17 VITALS
OXYGEN SATURATION: 96 % | BODY MASS INDEX: 39.35 KG/M2 | WEIGHT: 306.6 LBS | TEMPERATURE: 97.4 F | HEART RATE: 84 BPM | DIASTOLIC BLOOD PRESSURE: 77 MMHG | SYSTOLIC BLOOD PRESSURE: 112 MMHG | HEIGHT: 74 IN

## 2025-07-17 DIAGNOSIS — G89.29 CHRONIC LEFT SHOULDER PAIN: Primary | ICD-10-CM

## 2025-07-17 DIAGNOSIS — Z79.899 OTHER LONG TERM (CURRENT) DRUG THERAPY: ICD-10-CM

## 2025-07-17 DIAGNOSIS — M25.551 BILATERAL HIP PAIN: ICD-10-CM

## 2025-07-17 DIAGNOSIS — D72.829 LEUKOCYTOSIS, UNSPECIFIED TYPE: ICD-10-CM

## 2025-07-17 DIAGNOSIS — H60.502 ACUTE OTITIS EXTERNA OF LEFT EAR, UNSPECIFIED TYPE: ICD-10-CM

## 2025-07-17 DIAGNOSIS — R79.89 LOW TESTOSTERONE IN MALE: ICD-10-CM

## 2025-07-17 DIAGNOSIS — H61.22 IMPACTED CERUMEN OF LEFT EAR: ICD-10-CM

## 2025-07-17 DIAGNOSIS — H69.92 EUSTACHIAN TUBE DYSFUNCTION, LEFT: ICD-10-CM

## 2025-07-17 DIAGNOSIS — M25.552 BILATERAL HIP PAIN: ICD-10-CM

## 2025-07-17 DIAGNOSIS — M25.512 CHRONIC LEFT SHOULDER PAIN: Primary | ICD-10-CM

## 2025-07-17 LAB
BASOPHILS # BLD AUTO: 0.1 10*3/MM3 (ref 0–0.2)
BASOPHILS NFR BLD AUTO: 0.8 % (ref 0–1.5)
DEPRECATED RDW RBC AUTO: 41.8 FL (ref 37–54)
EOSINOPHIL # BLD AUTO: 0.12 10*3/MM3 (ref 0–0.4)
EOSINOPHIL NFR BLD AUTO: 0.9 % (ref 0.3–6.2)
ERYTHROCYTE [DISTWIDTH] IN BLOOD BY AUTOMATED COUNT: 13.6 % (ref 12.3–15.4)
HCT VFR BLD AUTO: 49 % (ref 37.5–51)
HGB BLD-MCNC: 16.2 G/DL (ref 13–17.7)
IMM GRANULOCYTES # BLD AUTO: 0.13 10*3/MM3 (ref 0–0.05)
IMM GRANULOCYTES NFR BLD AUTO: 1 % (ref 0–0.5)
LDH SERPL-CCNC: 172 U/L (ref 135–225)
LYMPHOCYTES # BLD AUTO: 3.06 10*3/MM3 (ref 0.7–3.1)
LYMPHOCYTES NFR BLD AUTO: 24.2 % (ref 19.6–45.3)
MCH RBC QN AUTO: 27.8 PG (ref 26.6–33)
MCHC RBC AUTO-ENTMCNC: 33.1 G/DL (ref 31.5–35.7)
MCV RBC AUTO: 84.2 FL (ref 79–97)
MONOCYTES # BLD AUTO: 0.9 10*3/MM3 (ref 0.1–0.9)
MONOCYTES NFR BLD AUTO: 7.1 % (ref 5–12)
NEUTROPHILS NFR BLD AUTO: 66 % (ref 42.7–76)
NEUTROPHILS NFR BLD AUTO: 8.35 10*3/MM3 (ref 1.7–7)
NRBC BLD AUTO-RTO: 0 /100 WBC (ref 0–0.2)
PLATELET # BLD AUTO: 263 10*3/MM3 (ref 140–450)
PMV BLD AUTO: 12.2 FL (ref 6–12)
PSA SERPL-MCNC: 0.58 NG/ML (ref 0–4)
RBC # BLD AUTO: 5.82 10*6/MM3 (ref 4.14–5.8)
WBC NRBC COR # BLD AUTO: 12.66 10*3/MM3 (ref 3.4–10.8)

## 2025-07-17 PROCEDURE — 84403 ASSAY OF TOTAL TESTOSTERONE: CPT | Performed by: STUDENT IN AN ORGANIZED HEALTH CARE EDUCATION/TRAINING PROGRAM

## 2025-07-17 PROCEDURE — 84402 ASSAY OF FREE TESTOSTERONE: CPT | Performed by: STUDENT IN AN ORGANIZED HEALTH CARE EDUCATION/TRAINING PROGRAM

## 2025-07-17 PROCEDURE — 85025 COMPLETE CBC W/AUTO DIFF WBC: CPT | Performed by: INTERNAL MEDICINE

## 2025-07-17 PROCEDURE — 83615 LACTATE (LD) (LDH) ENZYME: CPT | Performed by: INTERNAL MEDICINE

## 2025-07-17 PROCEDURE — 84153 ASSAY OF PSA TOTAL: CPT | Performed by: STUDENT IN AN ORGANIZED HEALTH CARE EDUCATION/TRAINING PROGRAM

## 2025-07-17 RX ORDER — FLUTICASONE PROPIONATE 50 MCG
2 SPRAY, SUSPENSION (ML) NASAL DAILY
Qty: 9.9 G | Refills: 2 | Status: SHIPPED | OUTPATIENT
Start: 2025-07-17

## 2025-07-17 RX ORDER — CIPROFLOXACIN AND DEXAMETHASONE 3; 1 MG/ML; MG/ML
4 SUSPENSION/ DROPS AURICULAR (OTIC) 2 TIMES DAILY
Qty: 7.5 ML | Refills: 0 | Status: SHIPPED | OUTPATIENT
Start: 2025-07-17 | End: 2025-07-22
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
## 2025-07-18 ENCOUNTER — RESULTS FOLLOW-UP (OUTPATIENT)
Dept: FAMILY MEDICINE CLINIC | Facility: CLINIC | Age: 35
End: 2025-07-18
Payer: MEDICARE

## 2025-07-21 LAB
BACTERIA SPEC CULT: NORMAL
BACTERIA SPEC CULT: NORMAL
CAMPYLOBACTER STL CULT: NORMAL
E COLI SXT STL QL IA: NEGATIVE
SALM + SHIG STL CULT: NORMAL
TESTOST FREE SERPL-MCNC: 9.5 PG/ML (ref 8.7–25.1)
TESTOST SERPL-MCNC: 601 NG/DL (ref 264–916)

## 2025-07-22 ENCOUNTER — CLINICAL SUPPORT (OUTPATIENT)
Dept: FAMILY MEDICINE CLINIC | Facility: CLINIC | Age: 35
End: 2025-07-22
Payer: MEDICARE

## 2025-07-22 DIAGNOSIS — Z79.899 MEDICATION MANAGEMENT: Primary | ICD-10-CM

## 2025-07-22 LAB
AMPHET+METHAMPHET UR QL: NEGATIVE
AMPHETAMINE INTERNAL CONTROL: NORMAL
AMPHETAMINES UR QL: NEGATIVE
BARBITURATE INTERNAL CONTROL: NORMAL
BARBITURATES UR QL SCN: NEGATIVE
BENZODIAZ UR QL SCN: NEGATIVE
BENZODIAZEPINE INTERNAL CONTROL: NORMAL
BUPRENORPHINE INTERNAL CONTROL: NORMAL
BUPRENORPHINE SERPL-MCNC: NEGATIVE NG/ML
CANNABINOIDS SERPL QL: NEGATIVE
COCAINE INTERNAL CONTROL: NORMAL
COCAINE UR QL: NEGATIVE
EXPIRATION DATE: NORMAL
Lab: NORMAL
MDMA (ECSTASY) INTERNAL CONTROL: NORMAL
MDMA UR QL SCN: NEGATIVE
METHADONE INTERNAL CONTROL: NORMAL
METHADONE UR QL SCN: NEGATIVE
METHAMPHETAMINE INTERNAL CONTROL: NORMAL
MORPHINE INTERNAL CONTROL: NORMAL
MORPHINE/OPIATES SCREEN, URINE: NEGATIVE
OXYCODONE INTERNAL CONTROL: NORMAL
OXYCODONE UR QL SCN: NEGATIVE
PCP UR QL SCN: NEGATIVE
PHENCYCLIDINE INTERNAL CONTROL: NORMAL
THC INTERNAL CONTROL: NORMAL

## 2025-07-22 PROCEDURE — 80305 DRUG TEST PRSMV DIR OPT OBS: CPT | Performed by: STUDENT IN AN ORGANIZED HEALTH CARE EDUCATION/TRAINING PROGRAM

## 2025-07-29 RX ORDER — VONOPRAZAN FUMARATE 26.72 MG/1
20 TABLET ORAL DAILY
Qty: 30 TABLET | Refills: 1 | Status: SHIPPED | OUTPATIENT
Start: 2025-07-29 | End: 2025-07-29 | Stop reason: SDUPTHER

## 2025-07-29 RX ORDER — VONOPRAZAN FUMARATE 26.72 MG/1
20 TABLET ORAL DAILY
Qty: 30 TABLET | Refills: 5 | Status: SHIPPED | OUTPATIENT
Start: 2025-07-29

## 2025-08-05 DIAGNOSIS — M25.552 BILATERAL HIP PAIN: ICD-10-CM

## 2025-08-05 DIAGNOSIS — M25.551 BILATERAL HIP PAIN: ICD-10-CM

## 2025-08-05 RX ORDER — TRAMADOL HYDROCHLORIDE 50 MG/1
50 TABLET ORAL EVERY 8 HOURS PRN
Qty: 90 TABLET | Refills: 0 | Status: SHIPPED | OUTPATIENT
Start: 2025-08-05

## 2025-08-06 ENCOUNTER — HOSPITAL ENCOUNTER (OUTPATIENT)
Dept: GENERAL RADIOLOGY | Facility: HOSPITAL | Age: 35
Discharge: HOME | End: 2025-08-06
Admitting: STUDENT IN AN ORGANIZED HEALTH CARE EDUCATION/TRAINING PROGRAM
Payer: MEDICARE

## 2025-08-06 DIAGNOSIS — G89.29 CHRONIC LEFT SHOULDER PAIN: ICD-10-CM

## 2025-08-06 DIAGNOSIS — M25.512 CHRONIC LEFT SHOULDER PAIN: ICD-10-CM

## 2025-08-06 PROCEDURE — 73030 X-RAY EXAM OF SHOULDER: CPT

## 2025-08-11 DIAGNOSIS — M25.552 BILATERAL HIP PAIN: ICD-10-CM

## 2025-08-11 DIAGNOSIS — M25.551 BILATERAL HIP PAIN: ICD-10-CM

## 2025-08-12 RX ORDER — TRAMADOL HYDROCHLORIDE 50 MG/1
50 TABLET ORAL EVERY 8 HOURS PRN
Qty: 90 TABLET | Refills: 0 | OUTPATIENT
Start: 2025-08-12

## 2025-08-26 ENCOUNTER — OFFICE VISIT (OUTPATIENT)
Dept: FAMILY MEDICINE CLINIC | Facility: CLINIC | Age: 35
End: 2025-08-26
Payer: MEDICARE

## 2025-08-26 VITALS
SYSTOLIC BLOOD PRESSURE: 118 MMHG | WEIGHT: 301 LBS | BODY MASS INDEX: 38.63 KG/M2 | HEART RATE: 98 BPM | HEIGHT: 74 IN | OXYGEN SATURATION: 99 % | TEMPERATURE: 97.6 F | DIASTOLIC BLOOD PRESSURE: 70 MMHG

## 2025-08-26 DIAGNOSIS — M25.551 BILATERAL HIP PAIN: ICD-10-CM

## 2025-08-26 DIAGNOSIS — Z79.899 OTHER LONG TERM (CURRENT) DRUG THERAPY: ICD-10-CM

## 2025-08-26 DIAGNOSIS — M79.7 FIBROMYALGIA: ICD-10-CM

## 2025-08-26 DIAGNOSIS — K58.0 IRRITABLE BOWEL SYNDROME WITH DIARRHEA: ICD-10-CM

## 2025-08-26 DIAGNOSIS — K58.2 IRRITABLE BOWEL SYNDROME WITH BOTH CONSTIPATION AND DIARRHEA: Primary | ICD-10-CM

## 2025-08-26 DIAGNOSIS — M25.552 BILATERAL HIP PAIN: ICD-10-CM

## 2025-08-26 RX ORDER — TRAMADOL HYDROCHLORIDE 50 MG/1
50 TABLET ORAL EVERY 8 HOURS PRN
Qty: 90 TABLET | Refills: 0 | Status: SHIPPED | OUTPATIENT
Start: 2025-08-26

## 2025-08-26 RX ORDER — BUDESONIDE 3 MG/1
9 CAPSULE, COATED PELLETS ORAL DAILY
Qty: 90 CAPSULE | Refills: 1 | Status: SHIPPED | OUTPATIENT
Start: 2025-08-26

## 2025-08-26 RX ORDER — CHOLESTYRAMINE 4 G/9G
4 POWDER, FOR SUSPENSION ORAL
COMMUNITY
Start: 2025-08-22 | End: 2026-08-22

## 2025-08-26 RX ORDER — DICYCLOMINE HCL 20 MG
20 TABLET ORAL EVERY 6 HOURS PRN
Qty: 360 TABLET | Refills: 0 | Status: SHIPPED | OUTPATIENT
Start: 2025-08-26

## 2025-08-26 RX ORDER — BUDESONIDE 3 MG/1
9 CAPSULE, COATED PELLETS ORAL DAILY
Qty: 90 CAPSULE | Refills: 1 | OUTPATIENT
Start: 2025-08-26

## 2025-08-26 RX ORDER — VONOPRAZAN FUMARATE 26.72 MG/1
20 TABLET ORAL DAILY
Qty: 90 TABLET | Refills: 2 | Status: SHIPPED | OUTPATIENT
Start: 2025-08-26

## (undated) DEVICE — SOLIDIFIER LIQLOC PLS 1500CC BT

## (undated) DEVICE — PENCL E/S HNDSWCH ROCKR CB

## (undated) DEVICE — BLCK/BITE BLOX WO/DENTL/RIM W/STRAP 54F

## (undated) DEVICE — COVER,LIGHT HANDLE,FLX,1/PK: Brand: MEDLINE INDUSTRIES, INC.

## (undated) DEVICE — SINGLE-USE BIOPSY FORCEPS: Brand: RADIAL JAW 4

## (undated) DEVICE — SEAL

## (undated) DEVICE — Device: Brand: DEFENDO AIR/WATER/SUCTION AND BIOPSY VALVE

## (undated) DEVICE — 2, DISPOSABLE SUCTION/IRRIGATOR WITHOUT DISPOSABLE TIP: Brand: STRYKEFLOW

## (undated) DEVICE — CONN JET HYDRA H20 AUXILIARY DISP

## (undated) DEVICE — SOL IRRG H2O PL/BG 1000ML STRL

## (undated) DEVICE — SUT MERSILENE POLYSTR CT1 BR 0 75CM GRN

## (undated) DEVICE — DAVINCI-LF: Brand: MEDLINE INDUSTRIES, INC.

## (undated) DEVICE — SYR LL TP 10ML STRL

## (undated) DEVICE — STRIP CLS WND SUTURESTRIP/PLS 0.5X4IN TP1103

## (undated) DEVICE — LINER SURG CANSTR SXN S/RIGD 1500CC

## (undated) DEVICE — INTENDED FOR TISSUE SEPARATION, AND OTHER PROCEDURES THAT REQUIRE A SHARP SURGICAL BLADE TO PUNCTURE OR CUT.: Brand: BARD-PARKER ® CARBON RIB-BACK BLADES

## (undated) DEVICE — NON-WOVEN ADHESIVE WOUND DRESSING: Brand: PRIMAPORE ADHESIVE WOUND DRSG 7.2*5CM

## (undated) DEVICE — LAPAROVUE VISIBILITY SYSTEM LAPAROSCOPIC SOLUTIONS: Brand: LAPAROVUE

## (undated) DEVICE — ENDOPATH XCEL WITH OPTIVIEW TECHNOLOGY BLADELESS TROCARS WITH STABILITY SLEEVES: Brand: ENDOPATH XCEL OPTIVIEW

## (undated) DEVICE — ANTIBACTERIAL UNDYED BRAIDED (POLYGLACTIN 910), SYNTHETIC ABSORBABLE SUTURE: Brand: COATED VICRYL

## (undated) DEVICE — GLV SURG SENSICARE PI ORTHO SZ8 LF STRL

## (undated) DEVICE — ARM DRAPE

## (undated) DEVICE — Device

## (undated) DEVICE — SOL IRR NACL 0.9PCT 3000ML

## (undated) DEVICE — SYS CLOSE PORTII CARTR/THOMASN XL

## (undated) DEVICE — STERILE POLYISOPRENE POWDER-FREE SURGICAL GLOVES WITH EMOLLIENT COATING: Brand: PROTEXIS

## (undated) DEVICE — CANNULA SEAL

## (undated) DEVICE — REDUCER: Brand: ENDOWRIST

## (undated) DEVICE — SLV SCD KN/LEN ADJ EXPRSS BLENDED MD 1P/U

## (undated) DEVICE — TISSUE RETRIEVAL SYSTEM: Brand: INZII RETRIEVAL SYSTEM